# Patient Record
Sex: FEMALE | Race: WHITE | NOT HISPANIC OR LATINO | Employment: FULL TIME | URBAN - METROPOLITAN AREA
[De-identification: names, ages, dates, MRNs, and addresses within clinical notes are randomized per-mention and may not be internally consistent; named-entity substitution may affect disease eponyms.]

---

## 2018-08-17 ENCOUNTER — OFFICE VISIT (OUTPATIENT)
Dept: FAMILY MEDICINE | Facility: CLINIC | Age: 48
End: 2018-08-17
Payer: COMMERCIAL

## 2018-08-17 ENCOUNTER — LAB VISIT (OUTPATIENT)
Dept: LAB | Facility: HOSPITAL | Age: 48
End: 2018-08-17
Attending: NURSE PRACTITIONER
Payer: COMMERCIAL

## 2018-08-17 VITALS
SYSTOLIC BLOOD PRESSURE: 104 MMHG | HEART RATE: 61 BPM | OXYGEN SATURATION: 98 % | DIASTOLIC BLOOD PRESSURE: 78 MMHG | WEIGHT: 211.44 LBS | TEMPERATURE: 98 F | HEIGHT: 61 IN | BODY MASS INDEX: 39.92 KG/M2

## 2018-08-17 DIAGNOSIS — R10.817 GENERALIZED ABDOMINAL TENDERNESS WITHOUT REBOUND TENDERNESS: ICD-10-CM

## 2018-08-17 DIAGNOSIS — R10.10 UPPER ABDOMINAL PAIN: Primary | ICD-10-CM

## 2018-08-17 DIAGNOSIS — K21.9 GASTROESOPHAGEAL REFLUX DISEASE, ESOPHAGITIS PRESENCE NOT SPECIFIED: ICD-10-CM

## 2018-08-17 DIAGNOSIS — R10.10 UPPER ABDOMINAL PAIN: ICD-10-CM

## 2018-08-17 LAB
ALBUMIN SERPL BCP-MCNC: 4 G/DL
ALP SERPL-CCNC: 89 U/L
ALT SERPL W/O P-5'-P-CCNC: 22 U/L
ANION GAP SERPL CALC-SCNC: 8 MMOL/L
AST SERPL-CCNC: 20 U/L
BASOPHILS # BLD AUTO: 0.02 K/UL
BASOPHILS NFR BLD: 0.2 %
BILIRUB SERPL-MCNC: 0.5 MG/DL
BUN SERPL-MCNC: 11 MG/DL
CALCIUM SERPL-MCNC: 9.2 MG/DL
CHLORIDE SERPL-SCNC: 104 MMOL/L
CO2 SERPL-SCNC: 30 MMOL/L
CREAT SERPL-MCNC: 0.8 MG/DL
DIFFERENTIAL METHOD: ABNORMAL
EOSINOPHIL # BLD AUTO: 0.1 K/UL
EOSINOPHIL NFR BLD: 0.6 %
ERYTHROCYTE [DISTWIDTH] IN BLOOD BY AUTOMATED COUNT: 14.9 %
EST. GFR  (AFRICAN AMERICAN): >60 ML/MIN/1.73 M^2
EST. GFR  (NON AFRICAN AMERICAN): >60 ML/MIN/1.73 M^2
GLUCOSE SERPL-MCNC: 99 MG/DL
HCT VFR BLD AUTO: 40.1 %
HGB BLD-MCNC: 12.1 G/DL
IMM GRANULOCYTES # BLD AUTO: 0.03 K/UL
IMM GRANULOCYTES NFR BLD AUTO: 0.4 %
LYMPHOCYTES # BLD AUTO: 2.7 K/UL
LYMPHOCYTES NFR BLD: 32.1 %
MCH RBC QN AUTO: 22.1 PG
MCHC RBC AUTO-ENTMCNC: 30.2 G/DL
MCV RBC AUTO: 73 FL
MONOCYTES # BLD AUTO: 0.5 K/UL
MONOCYTES NFR BLD: 5.9 %
NEUTROPHILS # BLD AUTO: 5.2 K/UL
NEUTROPHILS NFR BLD: 60.8 %
NRBC BLD-RTO: 0 /100 WBC
PLATELET # BLD AUTO: 274 K/UL
PMV BLD AUTO: 10.7 FL
POTASSIUM SERPL-SCNC: 4.3 MMOL/L
PROT SERPL-MCNC: 7.2 G/DL
RBC # BLD AUTO: 5.48 M/UL
SODIUM SERPL-SCNC: 142 MMOL/L
WBC # BLD AUTO: 8.5 K/UL

## 2018-08-17 PROCEDURE — 80053 COMPREHEN METABOLIC PANEL: CPT

## 2018-08-17 PROCEDURE — 85025 COMPLETE CBC W/AUTO DIFF WBC: CPT

## 2018-08-17 PROCEDURE — 36415 COLL VENOUS BLD VENIPUNCTURE: CPT | Mod: PO

## 2018-08-17 PROCEDURE — 99999 PR PBB SHADOW E&M-NEW PATIENT-LVL IV: CPT | Mod: PBBFAC,,, | Performed by: NURSE PRACTITIONER

## 2018-08-17 PROCEDURE — 3008F BODY MASS INDEX DOCD: CPT | Mod: CPTII,S$GLB,, | Performed by: NURSE PRACTITIONER

## 2018-08-17 PROCEDURE — 99204 OFFICE O/P NEW MOD 45 MIN: CPT | Mod: S$GLB,,, | Performed by: NURSE PRACTITIONER

## 2018-08-17 RX ORDER — OMEPRAZOLE 20 MG/1
40 CAPSULE, DELAYED RELEASE ORAL DAILY
Qty: 30 CAPSULE | Refills: 3 | Status: SHIPPED | OUTPATIENT
Start: 2018-08-17 | End: 2020-05-15

## 2018-08-17 RX ORDER — OMEPRAZOLE 20 MG/1
40 CAPSULE, DELAYED RELEASE ORAL
COMMUNITY
Start: 2016-12-02 | End: 2018-08-17 | Stop reason: SDUPTHER

## 2018-08-17 NOTE — PROGRESS NOTES
"Subjective:       Patient ID: Madan More is a 48 y.o. female.    Chief Complaint: Abdominal Pain (after she eats- feels stuff comes up into chest/throat )    Patient who is new to me presents with a new problem of abdominal pain and GERD symptoms. She has been having reflux symptoms for years and now is having a worsening of abdominal pain.       Abdominal Pain   This is a new problem. The current episode started more than 1 year ago (4-5 years). The problem occurs daily. The problem has been rapidly worsening. The pain is located in the epigastric region. The pain is at a severity of 9/10. The pain is severe. Quality: feels like a hard ball of pain. Associated symptoms include constipation and nausea. Pertinent negatives include no anorexia, arthralgias, belching, diarrhea, dysuria, fever, flatus, frequency, headaches, melena, myalgias or vomiting. Associated symptoms comments: Everyday has a BM, no blood in stool. The pain is aggravated by eating. The pain is relieved by nothing. She has tried proton pump inhibitors for the symptoms. The treatment provided mild relief. Prior diagnostic workup includes upper endoscopy (found some "scar tissue" during endoscopy). Her past medical history is significant for GERD. There is no history of abdominal surgery, irritable bowel syndrome, pancreatitis, PUD or ulcerative colitis. Patient's medical history does not include kidney stones and UTI.     Review of Systems   Constitutional: Negative for chills, fatigue and fever.   HENT: Negative for congestion, sinus pressure, sinus pain, sneezing and sore throat.    Respiratory: Negative for cough, chest tightness, shortness of breath and wheezing.    Cardiovascular: Negative for chest pain, palpitations and leg swelling.   Gastrointestinal: Positive for abdominal pain, constipation and nausea. Negative for abdominal distention, anal bleeding, anorexia, blood in stool, diarrhea, flatus, melena, rectal pain and vomiting. "   Genitourinary: Negative for decreased urine volume, difficulty urinating, dysuria, frequency and urgency.   Musculoskeletal: Negative for arthralgias, gait problem, joint swelling and myalgias.   Skin: Negative for rash and wound.   Neurological: Negative for dizziness, light-headedness, numbness and headaches.       Objective:      Physical Exam   Constitutional: She is oriented to person, place, and time. She appears well-developed and well-nourished.   HENT:   Head: Normocephalic and atraumatic.   Right Ear: External ear normal.   Left Ear: External ear normal.   Nose: Nose normal.   Mouth/Throat: Oropharynx is clear and moist.   Eyes: Conjunctivae and EOM are normal. Pupils are equal, round, and reactive to light.   Neck: Normal range of motion. Neck supple.   Cardiovascular: Normal rate, regular rhythm, normal heart sounds and intact distal pulses.   Pulmonary/Chest: Effort normal and breath sounds normal.   Abdominal: Bowel sounds are decreased. There is generalized tenderness. There is no rigidity, no rebound, no guarding, no CVA tenderness, no tenderness at McBurney's point and negative Nolasco's sign.   Musculoskeletal: Normal range of motion.   Neurological: She is alert and oriented to person, place, and time.   Skin: Skin is warm and dry.   Psychiatric: She has a normal mood and affect. Her behavior is normal. Judgment and thought content normal.   Nursing note and vitals reviewed.      Assessment:       1. Upper abdominal pain    2. Gastroesophageal reflux disease, esophagitis presence not specified    3. Generalized abdominal tenderness without rebound tenderness    4. BMI 39.0-39.9,adult        Plan:       Upper abdominal pain  -     Comprehensive metabolic panel; Future; Expected date: 08/17/2018  -     CBC auto differential; Future; Expected date: 08/17/2018  -     US Abdomen Complete; Future; Expected date: 08/17/2018    Gastroesophageal reflux disease, esophagitis presence not specified  -      omeprazole (PRILOSEC) 20 MG capsule; Take 2 capsules (40 mg total) by mouth once daily.  Dispense: 30 capsule; Refill: 3  -     ranitidine (ZANTAC) 150 MG tablet; Take 1 tablet (150 mg total) by mouth 2 (two) times daily as needed for Heartburn.  Dispense: 60 tablet; Refill: 0    Generalized abdominal tenderness without rebound tenderness  -     Comprehensive metabolic panel; Future; Expected date: 08/17/2018  -     CBC auto differential; Future; Expected date: 08/17/2018  -     US Abdomen Complete; Future; Expected date: 08/17/2018    BMI 39.0-39.9,adult  Discussed dietary changes.     Will follow up with patient regarding lab and ultrasound. Patient to follow up with any new or concerning symptoms.

## 2018-08-17 NOTE — PATIENT INSTRUCTIONS
Abdominal Pain    Abdominal pain is pain in the stomach or belly area. Everyone has this pain from time to time. In many cases it goes away on its own. But abdominal pain can sometimes be due to a serious problem, such as appendicitis. So its important to know when to seek help.  Causes of abdominal pain  There are many possible causes of abdominal pain. Common causes in adults include:  · Constipation, diarrhea, or gas  · Stomach acid flowing back up into the esophagus (acid reflux or heartburn)  · Severe acid reflux, called GERD (gastroesophageal reflux disease)  · A sore in the lining of the stomach or small intestine (peptic ulcer)  · Inflammation of the gallbladder, liver, or pancreas  · Gallstones or kidney stones  · Appendicitis   · Intestinal blockage   · An internal organ pushing through a muscle or other tissue (hernia)  · Urinary tract infections  · In women, menstrual cramps, fibroids, or endometriosis  · Inflammation or infection of the intestines  Diagnosing the cause of abdominal pain  Your healthcare provider will do a physical exam help find the cause of your pain. If needed, tests will be ordered. Belly pain has many possible causes. So it can be hard to find the reason for your pain. Giving details about your pain can help. Tell your provider where and when you feel the pain, and what makes it better or worse. Also let your provider know if you have other symptoms such as:  · Fever  · Tiredness  · Upset stomach (nausea)  · Vomiting  · Changes in bathroom habits  Treating abdominal pain  Some causes of pain need emergency medical treatment right away. These include appendicitis or a bowel blockage. Other problems can be treated with rest, fluids, or medicines. Your healthcare provider can give you specific instructions for treatment or self-care based on what is causing your pain.  If you have vomiting or diarrhea, sip water or other clear fluids. When you are ready to eat solid foods again,  start with small amounts of easy-to-digest, low-fat foods. These include apple sauce, toast, or crackers.   When to seek medical care  Call 911 or go to the hospital right away if you:  · Cant pass stool and are vomiting  · Are vomiting blood or have bloody diarrhea or black, tarry diarrhea  · Have chest, neck, or shoulder pain  · Feel like you might pass out  · Have pain in your shoulder blades with nausea  · Have sudden, severe belly pain  · Have new, severe pain unlike any you have felt before  · Have a belly that is rigid, hard, and tender to touch  Call your healthcare provider if you have:  · Pain for more than 5 days  · Bloating for more than 2 days  · Diarrhea for more than 5 days  · A fever of 100.4°F (38.0°C) or higher, or as directed by your provider  · Pain that gets worse  · Weight loss for no reason  · Continued lack of appetite  · Blood in your stool  How to prevent abdominal pain  Here are some tips to help prevent abdominal pain:  · Eat smaller amounts of food at one time.  · Avoid greasy, fried, or other high-fat foods.  · Avoid foods that give you gas.  · Exercise regularly.  · Drink plenty of fluids.  To help prevent GERD symptoms:  · Quit smoking.  · Reduce alcohol and certain foods that increase stomach acid.  · Avoid aspirin and over-the-counter pain and fever medicines (NSAIDS or nonsteroidal anti-inflammatory drugs), if possible  · Lose extra weight.  · Finish eating at least 2 hours before you go to bed or lie down.  · Raise the head of your bed.  Date Last Reviewed: 7/1/2016  © 3234-6312 [employer]. [address] [place] [address], [place] [address]. All rights reserved. This information is not intended as a substitute for professional medical care. Always follow your healthcare professional's instructions.

## 2018-08-20 ENCOUNTER — TELEPHONE (OUTPATIENT)
Dept: FAMILY MEDICINE | Facility: CLINIC | Age: 48
End: 2018-08-20

## 2018-08-20 NOTE — TELEPHONE ENCOUNTER
----- Message from Dulce Maria Almanzar sent at 8/20/2018 12:16 PM CDT -----  Contact: Madan  Type:  Patient Returning Call    Who Called:  patient  Who Left Message for Patient:  Not sure  Does the patient know what this is regarding?:  possible  Best Call Back Number:  [phone]  Additional Information:  na

## 2018-08-20 NOTE — PROGRESS NOTES
Please call the patient and let her know that her chemistry lab and blood count is all within acceptable range. We will know more when we get the results of the ultrasound. Thanks.

## 2018-08-23 ENCOUNTER — HOSPITAL ENCOUNTER (OUTPATIENT)
Dept: RADIOLOGY | Facility: HOSPITAL | Age: 48
Discharge: HOME | End: 2018-08-23
Attending: NURSE PRACTITIONER
Payer: COMMERCIAL

## 2018-08-23 DIAGNOSIS — R10.10 UPPER ABDOMINAL PAIN: ICD-10-CM

## 2018-08-23 DIAGNOSIS — R10.817 GENERALIZED ABDOMINAL TENDERNESS WITHOUT REBOUND TENDERNESS: ICD-10-CM

## 2018-08-23 PROCEDURE — 76700 US EXAM ABDOM COMPLETE: CPT | Mod: TC,PO

## 2018-08-23 PROCEDURE — 76700 US EXAM ABDOM COMPLETE: CPT | Mod: 26,,, | Performed by: RADIOLOGY

## 2018-08-24 ENCOUNTER — TELEPHONE (OUTPATIENT)
Dept: FAMILY MEDICINE | Facility: CLINIC | Age: 48
End: 2018-08-24

## 2018-08-27 DIAGNOSIS — R10.9 ABDOMINAL PAIN, UNSPECIFIED ABDOMINAL LOCATION: Primary | ICD-10-CM

## 2018-09-25 ENCOUNTER — OFFICE VISIT (OUTPATIENT)
Dept: GASTROENTEROLOGY | Facility: CLINIC | Age: 48
End: 2018-09-25
Payer: COMMERCIAL

## 2018-09-25 VITALS
HEART RATE: 61 BPM | SYSTOLIC BLOOD PRESSURE: 104 MMHG | WEIGHT: 211.63 LBS | HEIGHT: 61 IN | DIASTOLIC BLOOD PRESSURE: 78 MMHG | BODY MASS INDEX: 39.95 KG/M2

## 2018-09-25 DIAGNOSIS — R68.81 EARLY SATIETY: ICD-10-CM

## 2018-09-25 DIAGNOSIS — R11.0 NAUSEA: ICD-10-CM

## 2018-09-25 DIAGNOSIS — R13.19 ESOPHAGEAL DYSPHAGIA: ICD-10-CM

## 2018-09-25 DIAGNOSIS — K21.9 GASTROESOPHAGEAL REFLUX DISEASE, ESOPHAGITIS PRESENCE NOT SPECIFIED: ICD-10-CM

## 2018-09-25 DIAGNOSIS — R10.13 EPIGASTRIC PAIN: Primary | ICD-10-CM

## 2018-09-25 PROCEDURE — 99999 PR PBB SHADOW E&M-EST. PATIENT-LVL III: CPT | Mod: PBBFAC,,, | Performed by: INTERNAL MEDICINE

## 2018-09-25 PROCEDURE — 3008F BODY MASS INDEX DOCD: CPT | Mod: CPTII,S$GLB,, | Performed by: INTERNAL MEDICINE

## 2018-09-25 PROCEDURE — 99204 OFFICE O/P NEW MOD 45 MIN: CPT | Mod: S$GLB,,, | Performed by: INTERNAL MEDICINE

## 2018-09-25 NOTE — PROGRESS NOTES
"Pt presents for evaluation chronic GI symptoms. Pt describes early satiety, pyrosis, and epigastric "fullness" past several years. Positive nausea. No vomiting. Positive pyrosis. Positive dysphagia, intermittent to solids. No bleeding. No diarrhea or constipation. No fever or jaundice. No significant abd pain. EGD many years ago, treated Nexium, partial response. Currently taking zantac without benefit. Appetite and weight stable. No rashes. Recent U/S notable for kidney and liver cysts, gallbladder adenomyomatosis.     REVIEW OF SYSTEMS:   Constitutional: Negative for fever, appetite change and unexpected weight change.  HENT: Negative for sore throat and positive trouble swallowing.  Eyes: Negative for visual disturbance.  Respiratory: Negative for chest tightness, shortness of breath and wheezing.  Cardiovascular: Negative for chest pain.  Gastrointestinal:  as per HPI  Genitourinary: Negative for dysuria, frequency and hematuria.  Musculoskeletal: Negative for myalgias, joint swelling and arthralgias.  Skin: Negative for color change and rash.   Neurological: Negative for syncope, weakness and headaches.  Psychiatric/Behavioral: Negative for confusion.    PHYSICAL EXAMINATION:                                                        GENERAL:  Comfortable, in no acute distress.      SKIN: Non-jaundiced.                             HEENT EXAM:  Nonicteric.  No adenopathy.  Oropharynx is clear.               NECK:  Supple.                                                               LUNGS:  Clear.                                                               CARDIAC:  Regular rate and rhythm.  S1, S2.  No murmur.                      ABDOMEN:  Soft, positive bowel sounds, nontender.  No hepatosplenomegaly or masses.  No rebound or guarding.                                             EXTREMITIES:  No edema.     NEURO: CN II-XII intact; motor/sensory non-focal.    IMP: 1. Early satiety          2. Epigastric pain     "      3. GERD          4. Dysphagia          5. Nausea    PLAN: 1. EGD/dilation.             2. Trial of PPI following EGD.             3. If EGD unremarkable, and symptoms persist, proceed with GES (r/o gastroparesis).

## 2018-10-05 ENCOUNTER — TELEPHONE (OUTPATIENT)
Dept: GASTROENTEROLOGY | Facility: CLINIC | Age: 48
End: 2018-10-05

## 2018-10-05 NOTE — TELEPHONE ENCOUNTER
----- Message from Garth Diaz sent at 10/5/2018  8:18 AM CDT -----  Contact: patient  Madan, [phone]. Calling to reschedule Monday's procedure. Please advise. Thanks.

## 2018-10-30 ENCOUNTER — PATIENT OUTREACH (OUTPATIENT)
Dept: ADMINISTRATIVE | Facility: HOSPITAL | Age: 48
End: 2018-10-30

## 2018-10-30 NOTE — LETTER
October 30, 2018    Madan More  [address] [place] [id number]             [facility]  [address]  [place] [address]  Phone: [phone] Dear Mrs. More:    [facility].  To help you get the most out of each of your visits, we will review your information to make sure you are up to date on all of your recommended tests and/or procedures.      As a new patient to Dr Rousseau , we may not have your complete medical records. She has found that your chart shows you may be due for lipid panel, mammogram and some vaccinations.     If you have had any of the above done at another facility, please bring the records or information with you so that your record at [facility] will be complete.      If you are currently taking medication, please bring it with you to your appointment for review.    Also, if you have any type of Advanced Directives, please bring them with you to your office visit so we may scan them into your chart.       If you have any questions or concerns, please don't hesitate to call.    Sincerely,        Betsy Romo LPN Clinical Care Coordinator  [facility]  [address] [facility] [address]  [place] [address]  [phone] (p)   [phone] (f)

## 2018-11-01 ENCOUNTER — TELEPHONE (OUTPATIENT)
Dept: GASTROENTEROLOGY | Facility: CLINIC | Age: 48
End: 2018-11-01

## 2018-11-01 NOTE — TELEPHONE ENCOUNTER
----- Message from Deandra Waters sent at 10/30/2018  9:32 AM CDT -----  Type: Needs Medical Advice    Who Called:  Patient  Best Call Back Number: [phone]  Additional Information: Needs to reschedule her procedure on 11/6/18. Please call to reschedule.

## 2018-11-05 ENCOUNTER — TELEPHONE (OUTPATIENT)
Dept: GASTROENTEROLOGY | Facility: CLINIC | Age: 48
End: 2018-11-05

## 2018-11-05 NOTE — TELEPHONE ENCOUNTER
----- Message from Samantha Fragoso sent at 11/2/2018  4:34 PM CDT -----    Pt  Is calling to  Speak to the nurse // please  Call  [phone]

## 2018-11-18 ENCOUNTER — ANESTHESIA EVENT (OUTPATIENT)
Dept: ENDOSCOPY | Facility: HOSPITAL | Age: 48
End: 2018-11-18
Payer: COMMERCIAL

## 2018-11-19 ENCOUNTER — ANESTHESIA (OUTPATIENT)
Dept: ENDOSCOPY | Facility: HOSPITAL | Age: 48
End: 2018-11-19
Payer: COMMERCIAL

## 2018-11-19 ENCOUNTER — HOSPITAL ENCOUNTER (OUTPATIENT)
Facility: HOSPITAL | Age: 48
Discharge: HOME | End: 2018-11-19
Attending: INTERNAL MEDICINE | Admitting: INTERNAL MEDICINE
Payer: COMMERCIAL

## 2018-11-19 VITALS
OXYGEN SATURATION: 100 % | SYSTOLIC BLOOD PRESSURE: 115 MMHG | DIASTOLIC BLOOD PRESSURE: 69 MMHG | TEMPERATURE: 97 F | WEIGHT: 205 LBS | BODY MASS INDEX: 40.25 KG/M2 | HEIGHT: 60 IN | HEART RATE: 70 BPM | RESPIRATION RATE: 13 BRPM

## 2018-11-19 DIAGNOSIS — R13.10 DYSPHAGIA: ICD-10-CM

## 2018-11-19 LAB — H PYLORI INDEX VALUE: NEGATIVE

## 2018-11-19 PROCEDURE — 43239 EGD BIOPSY SINGLE/MULTIPLE: CPT | Mod: PO | Performed by: INTERNAL MEDICINE

## 2018-11-19 PROCEDURE — 43239 EGD BIOPSY SINGLE/MULTIPLE: CPT | Mod: 59,,, | Performed by: INTERNAL MEDICINE

## 2018-11-19 PROCEDURE — 27201012 HC FORCEPS, HOT/COLD, DISP: Mod: PO | Performed by: INTERNAL MEDICINE

## 2018-11-19 PROCEDURE — 37000008 HC ANESTHESIA 1ST 15 MINUTES: Mod: PO | Performed by: INTERNAL MEDICINE

## 2018-11-19 PROCEDURE — 37000009 HC ANESTHESIA EA ADD 15 MINS: Mod: PO | Performed by: INTERNAL MEDICINE

## 2018-11-19 PROCEDURE — D9220A PRA ANESTHESIA: Mod: ANES,,, | Performed by: ANESTHESIOLOGY

## 2018-11-19 PROCEDURE — 87449 NOS EACH ORGANISM AG IA: CPT | Mod: PO | Performed by: INTERNAL MEDICINE

## 2018-11-19 PROCEDURE — 43248 EGD GUIDE WIRE INSERTION: CPT | Mod: ,,, | Performed by: INTERNAL MEDICINE

## 2018-11-19 PROCEDURE — D9220A PRA ANESTHESIA: Mod: CRNA,,, | Performed by: NURSE ANESTHETIST, CERTIFIED REGISTERED

## 2018-11-19 PROCEDURE — 63600175 PHARM REV CODE 636 W HCPCS: Mod: PO | Performed by: NURSE ANESTHETIST, CERTIFIED REGISTERED

## 2018-11-19 PROCEDURE — 88305 TISSUE EXAM BY PATHOLOGIST: CPT | Performed by: PATHOLOGY

## 2018-11-19 PROCEDURE — 43248 EGD GUIDE WIRE INSERTION: CPT | Mod: PO | Performed by: INTERNAL MEDICINE

## 2018-11-19 PROCEDURE — 25000003 PHARM REV CODE 250: Mod: PO | Performed by: INTERNAL MEDICINE

## 2018-11-19 RX ORDER — SODIUM CHLORIDE, SODIUM LACTATE, POTASSIUM CHLORIDE, CALCIUM CHLORIDE 600; 310; 30; 20 MG/100ML; MG/100ML; MG/100ML; MG/100ML
INJECTION, SOLUTION INTRAVENOUS CONTINUOUS
Status: DISCONTINUED | OUTPATIENT
Start: 2018-11-19 | End: 2018-11-19 | Stop reason: HOSPADM

## 2018-11-19 RX ORDER — LIDOCAINE HYDROCHLORIDE 10 MG/ML
1 INJECTION, SOLUTION EPIDURAL; INFILTRATION; INTRACAUDAL; PERINEURAL ONCE
Status: DISCONTINUED | OUTPATIENT
Start: 2018-11-19 | End: 2018-11-19 | Stop reason: HOSPADM

## 2018-11-19 RX ORDER — SUCRALFATE 1 G/1
1 TABLET ORAL 3 TIMES DAILY
Qty: 100 TABLET | Refills: 2 | Status: SHIPPED | OUTPATIENT
Start: 2018-11-19 | End: 2018-12-19

## 2018-11-19 RX ORDER — PROPOFOL 10 MG/ML
VIAL (ML) INTRAVENOUS
Status: DISCONTINUED | OUTPATIENT
Start: 2018-11-19 | End: 2018-11-19

## 2018-11-19 RX ORDER — SODIUM CHLORIDE 0.9 % (FLUSH) 0.9 %
3 SYRINGE (ML) INJECTION
Status: DISCONTINUED | OUTPATIENT
Start: 2018-11-19 | End: 2018-11-19 | Stop reason: HOSPADM

## 2018-11-19 RX ORDER — LIDOCAINE HCL/PF 100 MG/5ML
SYRINGE (ML) INTRAVENOUS
Status: DISCONTINUED | OUTPATIENT
Start: 2018-11-19 | End: 2018-11-19

## 2018-11-19 RX ADMIN — PROPOFOL 10 MG: 10 INJECTION, EMULSION INTRAVENOUS at 12:11

## 2018-11-19 RX ADMIN — PROPOFOL 100 MG: 10 INJECTION, EMULSION INTRAVENOUS at 11:11

## 2018-11-19 RX ADMIN — LIDOCAINE HYDROCHLORIDE 100 MG: 20 INJECTION, SOLUTION INTRAVENOUS at 11:11

## 2018-11-19 RX ADMIN — PROPOFOL 30 MG: 10 INJECTION, EMULSION INTRAVENOUS at 11:11

## 2018-11-19 RX ADMIN — SODIUM CHLORIDE, SODIUM LACTATE, POTASSIUM CHLORIDE, AND CALCIUM CHLORIDE: .6; .31; .03; .02 INJECTION, SOLUTION INTRAVENOUS at 10:11

## 2018-11-19 NOTE — [employer] PATIENT INSTRUCTIONS
Discharge Summary/Instructions after an Endoscopic Procedure  Patient Name: Madan More  Patient MRN: [id number]  Patient YOB: 1970 Monday, November 19, 2018  Gael Marina MD  RESTRICTIONS:  During your procedure today, you received medications for sedation.  These   medications may affect your judgment, balance and coordination.  Therefore,   for 24 hours, you have the following restrictions:   - DO NOT drive a car, operate machinery, make legal/financial decisions,   sign important papers or drink alcohol.    ACTIVITY:  Today: no heavy lifting, straining or running due to procedural   sedation/anesthesia.  The following day: return to full activity including work.  DIET:  Eat and drink normally unless instructed otherwise.     TREATMENT FOR COMMON SIDE EFFECTS:  - Mild abdominal pain, nausea, belching, bloating or excessive gas:  rest,   eat lightly and use a heating pad.  - Sore Throat: treat with throat lozenges and/or gargle with warm salt   water.  - Because air was used during the procedure, expelling large amounts of air   from your rectum or belching is normal.  - If a bowel prep was taken, you may not have a bowel movement for 1-3 days.    This is normal.  SYMPTOMS TO WATCH FOR AND REPORT TO YOUR PHYSICIAN:  1. Abdominal pain or bloating, other than gas cramps.  2. Chest pain.  3. Back pain.  4. Signs of infection such as: chills or fever occurring within 24 hours   after the procedure.  5. Rectal bleeding, which would show as bright red, maroon, or black stools.   (A tablespoon of blood from the rectum is not serious, especially if   hemorrhoids are present.)  6. Vomiting.  7. Weakness or dizziness.  GO DIRECTLY TO THE NEAREST EMERGENCY ROOM IF YOU HAVE ANY OF THE FOLLOWING:      Difficulty breathing              Chills and/or fever over 101 F   Persistent vomiting and/or vomiting blood   Severe abdominal pain   Severe chest pain   Black, tarry stools   Bleeding- more than one  tablespoon   Any other symptom or condition that you feel may need urgent attention  Your doctor recommends these additional instructions:  If any biopsies were taken, your doctors clinic will contact you in 1 to 2   weeks with any results.  We are waiting for your pathology results.   Continue your present medications.   You are being discharged to home.  For questions, problems or results please call your physician - Gael Marina MD at Work: [phone].  EMERGENCY PHONE NUMBER: [phone], LAB RESULTS: [phone]  IF A COMPLICATION OR EMERGENCY SITUATION ARISES AND YOU ARE UNABLE TO REACH   YOUR PHYSICIAN - GO DIRECTLY TO THE EMERGENCY ROOM.  ___________________________________________  Nurse Signature  ___________________________________________  Patient/Designated Responsible Party Signature  Gael Marina MD  11/19/2018 12:17:10 PM  This report has been verified and signed electronically.  [employer]

## 2018-11-19 NOTE — ANESTHESIA PREPROCEDURE EVALUATION
11/19/2018  Madan More is a 48 y.o., female.    Anesthesia Evaluation    I have reviewed the Patient Summary Reports.    I have reviewed the Nursing Notes.   I have reviewed the Medications.     Review of Systems  Social:  Non-Smoker, No Alcohol Use    Hematology/Oncology:  Hematology Normal   Oncology Normal     EENT/Dental:EENT/Dental Normal   Cardiovascular:  Cardiovascular Normal     Pulmonary:  Pulmonary Normal    Renal/:  Renal/ Normal     Hepatic/GI:   GERD Dysphagia    Musculoskeletal:  Musculoskeletal Normal    Neurological:  Neurology Normal    Endocrine:  Endocrine Normal    Dermatological:  Skin Normal    Psych:  Psychiatric Normal           Physical Exam  General:  Morbid Obesity    Airway/Jaw/Neck:  Airway Findings: Mouth Opening: Normal Tongue: Normal  General Airway Assessment: Adult  Mallampati: I  TM Distance: Normal, at least 6 cm        Eyes/Ears/Nose:  EYES/EARS/NOSE FINDINGS: Normal   Dental:  DENTAL FINDINGS: Normal   Chest/Lungs:  Chest/Lungs Findings: Clear to auscultation, Normal Respiratory Rate     Heart/Vascular:  Heart Findings: Rate: Normal  Rhythm: Regular Rhythm        Mental Status:  Mental Status Findings:  Cooperative, Alert and Oriented         Anesthesia Plan  Type of Anesthesia, risks & benefits discussed:  Anesthesia Type:  general  Patient's Preference:   Intra-op Monitoring Plan: standard ASA monitors  Intra-op Monitoring Plan Comments:   Post Op Pain Control Plan:   Post Op Pain Control Plan Comments:   Induction:   IV  Beta Blocker:  Patient is not currently on a Beta-Blocker (No further documentation required).       Informed Consent: Patient understands risks and agrees with Anesthesia plan.  Questions answered. Anesthesia consent signed with patient.  ASA Score: 2     Day of Surgery Review of History & Physical: I have interviewed and examined the  patient. I have reviewed the patient's H&P dated:  There are no significant changes.  H&P update referred to the provider.         Ready For Surgery From Anesthesia Perspective.

## 2018-11-19 NOTE — DISCHARGE INSTRUCTIONS
"    AFTER YOUR COLONOSCOPY:    What to expect after your procedure?    -You may have some abdominal discomfort today. This is usually from air that is trapped during your procedure. Often, relief from this is obtained by "passing" this air.     -You may resume your normal diet as tolerated. You should avoid "gassy" foods such as beans, broccoli or other vegetables, etc. It is not unusual for some patients to experience some mild nausea or maybe even some vomiting after this procedure.     -You may not have a normal bowel movement for 1 or 2 days since your colon has been cleared.     -You may notice small amounts of blood on the toilet tissue or mixed in your stool. This is often from irritation from the scope, hemorrhoids, or if a polyp or specimen was taken.    -If a polyp was removed or other specimen was taken, you will be notified of your results in 1-2 weeks.     -You should relax today and avoid vigorous activity. You may resume your normal activities tomorrow.     -Because you received sedation for your procedure, you are not allowed to drive today. You should also be careful over the next few hours as you may remain tired or "groggy", especially with position changes such as standing.      When to call your doctor?     -If you have abdominal pain that does not go away after 1 or 2 days.  -If you have bleeding that equals about a tablespoon or more.   -If you have a fever greater than 101'F.  -If you have persistent vomiting that last more than 6 hours.    Discharge Instructions: After Your Surgery  Youve just had surgery. During surgery, you were given medicine called anesthesia to keep you relaxed and free of pain. After surgery, you may have some pain or nausea. This is common. Here are some tips for feeling better and getting well after surgery.     Stay on schedule with your medicine.   Going home  Your healthcare provider will show you how to take care of yourself when you go home. He or she will also " answer your questions. Have an adult family member or friend drive you home. For the first 24 hours after your surgery:  · Do not drive or use heavy equipment.  · Do not make important decisions or sign legal papers.  · Do not drink alcohol.  · Have someone stay with you, if needed. He or she can watch for problems and help keep you safe.  Be sure to go to all follow-up visits with your healthcare provider. And rest after your surgery for as long as your healthcare provider tells you to.  Coping with pain  If you have pain after surgery, pain medicine will help you feel better. Take it as told, before pain becomes severe. Also, ask your healthcare provider or pharmacist about other ways to control pain. This might be with heat, ice, or relaxation. And follow any other instructions your surgeon or nurse gives you.  Tips for taking pain medicine  To get the best relief possible, remember these points:  · Pain medicines can upset your stomach. Taking them with a little food may help.  · Most pain relievers taken by mouth need at least 20 to 30 minutes to start to work.  · Taking medicine on a schedule can help you remember to take it. Try to time your medicine so that you can take it before starting an activity. This might be before you get dressed, go for a walk, or sit down for dinner.  · Constipation is a common side effect of pain medicines. Call your healthcare provider before taking any medicines such as laxatives or stool softeners to help ease constipation. Also ask if you should skip any foods. Drinking lots of fluids and eating foods such as fruits and vegetables that are high in fiber can also help. Remember, do not take laxatives unless your surgeon has prescribed them.  · Drinking alcohol and taking pain medicine can cause dizziness and slow your breathing. It can even be deadly. Do not drink alcohol while taking pain medicine.  · Pain medicine can make you react more slowly to things. Do not drive or run  machinery while taking pain medicine.  Your healthcare provider may tell you to take acetaminophen to help ease your pain. Ask him or her how much you are supposed to take each day. Acetaminophen or other pain relievers may interact with your prescription medicines or other over-the-counter (OTC) medicines. Some prescription medicines have acetaminophen and other ingredients. Using both prescription and OTC acetaminophen for pain can cause you to overdose. Read the labels on your OTC medicines with care. This will help you to clearly know the list of ingredients, how much to take, and any warnings. It may also help you not take too much acetaminophen. If you have questions or do not understand the information, ask your pharmacist or healthcare provider to explain it to you before you take the OTC medicine.  Managing nausea  Some people have an upset stomach after surgery. This is often because of anesthesia, pain, or pain medicine, or the stress of surgery. These tips will help you handle nausea and eat healthy foods as you get better. If you were on a special food plan before surgery, ask your healthcare provider if you should follow it while you get better. These tips may help:  · Do not push yourself to eat. Your body will tell you when to eat and how much.  · Start off with clear liquids and soup. They are easier to digest.  · Next try semi-solid foods, such as mashed potatoes, applesauce, and gelatin, as you feel ready.  · Slowly move to solid foods. Dont eat fatty, rich, or spicy foods at first.  · Do not force yourself to have 3 large meals a day. Instead eat smaller amounts more often.  · Take pain medicines with a small amount of solid food, such as crackers or toast, to avoid nausea.     Call your surgeon if  · You still have pain an hour after taking medicine. The medicine may not be strong enough.  · You feel too sleepy, dizzy, or groggy. The medicine may be too strong.  · You have side effects like  nausea, vomiting, or skin changes, such as rash, itching, or hives.       If you have obstructive sleep apnea  You were given anesthesia medicine during surgery to keep you comfortable and free of pain. After surgery, you may have more apnea spells because of this medicine and other medicines you were given. The spells may last longer than usual.   At home:  · Keep using the continuous positive airway pressure (CPAP) device when you sleep. Unless your healthcare provider tells you not to, use it when you sleep, day or night. CPAP is a common device used to treat obstructive sleep apnea.  · Talk with your provider before taking any pain medicine, muscle relaxants, or sedatives. Your provider will tell you about the possible dangers of taking these medicines.  Date Last Reviewed: 12/1/2016  © 7058-0754 [employer]. [address] [place] [address], [place] [address]. All rights reserved. This information is not intended as a substitute for professional medical care. Always follow your healthcare professional's instructions.

## 2018-11-19 NOTE — DISCHARGE SUMMARY
Discharge Note  Short Stay      SUMMARY     Admit Date: 11/19/2018    Attending Physician: Gael Marina MD     Discharge Physician: Gael Marina MD    Discharge Date: 11/19/2018 12:18 PM    Final Diagnosis: Dysphagia, unspecified type [R13.10]  Gastroesophageal reflux disease, esophagitis presence not specified [K21.9]  Early satiety [R68.81]    Disposition: HOME OR SELF CARE    Patient Instructions:   Current Discharge Medication List      START taking these medications    Details   sucralfate (CARAFATE) 1 gram tablet Take 1 tablet (1 g total) by mouth 3 (three) times daily.  Qty: 100 tablet, Refills: 2         CONTINUE these medications which have NOT CHANGED    Details   omeprazole (PRILOSEC) 20 MG capsule Take 2 capsules (40 mg total) by mouth once daily.  Qty: 30 capsule, Refills: 3    Associated Diagnoses: Gastroesophageal reflux disease, esophagitis presence not specified      albuterol 90 mcg/actuation inhaler Inhale 1-2 puffs into the lungs every 6 (six) hours as needed. Rescue  Qty: 1 Inhaler, Refills: 0      oxycodone-acetaminophen (PERCOCET) 5-325 mg per tablet Take 1 tablet by mouth every 4 (four) hours as needed for Pain.  Qty: 20 tablet, Refills: 0      ranitidine (ZANTAC) 150 MG capsule Take 150 mg by mouth 2 (two) times daily.             Discharge Procedure Orders (must include Diet, Follow-up, Activity)    Follow Up:  Follow up with PCP as previously scheduled  Resume routine diet.  Activity as tolerated.    No driving day of procedure.

## 2018-11-19 NOTE — H&P
History & Physical - Short Stay  Gastroenterology      SUBJECTIVE:     Procedure: EGD    Chief Complaint/Indication for Procedure: Dysphagia, Epigastric Pain and Reflux    PTA Medications   Medication Sig    omeprazole (PRILOSEC) 20 MG capsule Take 2 capsules (40 mg total) by mouth once daily.    albuterol 90 mcg/actuation inhaler Inhale 1-2 puffs into the lungs every 6 (six) hours as needed. Rescue    oxycodone-acetaminophen (PERCOCET) 5-325 mg per tablet Take 1 tablet by mouth every 4 (four) hours as needed for Pain.    ranitidine (ZANTAC) 150 MG capsule Take 150 mg by mouth 2 (two) times daily.       Review of patient's allergies indicates:   Allergen Reactions    Penicillins Hives and Swelling        Past Medical History:   Diagnosis Date    GERD (gastroesophageal reflux disease)      Past Surgical History:   Procedure Laterality Date    HYSTERECTOMY      KNEE SURGERY       Family History   Problem Relation Age of Onset    Hypertension Mother     No Known Problems Father      Social History     Tobacco Use    Smoking status: Never Smoker    Smokeless tobacco: Never Used   Substance Use Topics    Alcohol use: No    Drug use: No         OBJECTIVE:     Vital Signs (Most Recent)  Temp: 97.7 °F (36.5 °C) (11/19/18 1053)  Pulse: 68 (11/19/18 1053)  Resp: 18 (11/19/18 1053)  BP: 120/69 (11/19/18 1053)  SpO2: 97 % (11/19/18 1053)    Physical Exam:                                                       GENERAL:  Comfortable, in no acute distress.                                 HEENT EXAM:  Nonicteric.  No adenopathy.  Oropharynx is clear.               NECK:  Supple.                                                               LUNGS:  Clear.                                                               CARDIAC:  Regular rate and rhythm.  S1, S2.  No murmur.                      ABDOMEN:  Soft, positive bowel sounds, nontender.  No hepatosplenomegaly or masses.  No rebound or guarding.                                              EXTREMITIES:  No edema.     MENTAL STATUS:  Normal, alert and oriented.      ASSESSMENT/PLAN:     Assessment: Dysphagia, Epigastric Pain and Reflux    Plan: EGD    Anesthesia Plan: General    ASA Grade: ASA 2 - Patient with mild systemic disease with no functional limitations    MALLAMPATI SCORE:  I (soft palate, uvula, fauces, and tonsillar pillars visible)

## 2018-11-19 NOTE — TRANSFER OF CARE
Anesthesia Transfer of Care Note    Patient: Madan More    Procedure(s) Performed: Procedure(s) (LRB):  EGD (ESOPHAGOGASTRODUODENOSCOPY) (N/A)    Patient location: PACU    Anesthesia Type: general    Transport from OR: Transported from OR on room air with adequate spontaneous ventilation    Post pain: adequate analgesia    Post assessment: no apparent anesthetic complications and tolerated procedure well    Post vital signs: stable    Level of consciousness: awake and sedated    Nausea/Vomiting: no nausea/vomiting    Complications: none    Transfer of care protocol was followed      Last vitals:   Visit Vitals  /69 (BP Location: Right arm, Patient Position: Lying)   Pulse 68   Temp 36.5 °C (97.7 °F) (Skin)   Resp 18   Ht 5' (1.524 m)   Wt 93 kg (205 lb)   SpO2 97%   Breastfeeding? No   BMI 40.04 kg/m²

## 2018-11-20 NOTE — ANESTHESIA POSTPROCEDURE EVALUATION
Anesthesia Post Evaluation    Patient: Madan More    Procedure(s) Performed: Procedure(s) (LRB):  EGD (ESOPHAGOGASTRODUODENOSCOPY) (N/A)    Final Anesthesia Type: general  Patient location during evaluation: PACU  Patient participation: Yes- Able to Participate  Level of consciousness: awake and alert  Post-procedure vital signs: reviewed and stable  Pain management: adequate  Airway patency: patent  PONV status at discharge: No PONV  Anesthetic complications: no      Cardiovascular status: hemodynamically stable  Respiratory status: unassisted and room air  Hydration status: euvolemic  Follow-up not needed.        Visit Vitals  /69 (BP Location: Left arm, Patient Position: Sitting)   Pulse 70   Temp 36.3 °C (97.3 °F) (Skin)   Resp 13   Ht 5' (1.524 m)   Wt 93 kg (205 lb)   SpO2 100%   Breastfeeding? No   BMI 40.04 kg/m²       Pain/Lyubov Score: Pain Assessment Performed: Yes (11/19/2018 12:43 PM)  Presence of Pain: denies (11/19/2018 12:43 PM)  Lyubov Score: 10 (11/19/2018 12:43 PM)

## 2019-02-07 ENCOUNTER — TELEPHONE (OUTPATIENT)
Dept: FAMILY MEDICINE | Facility: CLINIC | Age: 49
End: 2019-02-07

## 2019-02-07 NOTE — TELEPHONE ENCOUNTER
----- Message from Marcos Cotton sent at 2/7/2019  9:04 AM CST -----  Contact: pt  Type:  Sooner Apoointment Request    Caller is requesting a sooner appointment.  Caller declined first available appointment listed below.  Caller will not accept being placed on the waitlist and is requesting a message be sent to doctor.    Name of Caller:  pt  When is the first available appointment?  None showing  Symptoms:  Bad headaches back pain  Best Call Back Number:  [phone]  Additional Information:

## 2019-02-11 ENCOUNTER — OFFICE VISIT (OUTPATIENT)
Dept: FAMILY MEDICINE | Facility: CLINIC | Age: 49
End: 2019-02-11
Payer: COMMERCIAL

## 2019-02-11 ENCOUNTER — HOSPITAL ENCOUNTER (OUTPATIENT)
Dept: RADIOLOGY | Facility: HOSPITAL | Age: 49
Discharge: HOME | End: 2019-02-11
Attending: NURSE PRACTITIONER
Payer: COMMERCIAL

## 2019-02-11 VITALS
HEART RATE: 62 BPM | HEIGHT: 60 IN | BODY MASS INDEX: 41.29 KG/M2 | TEMPERATURE: 98 F | WEIGHT: 210.31 LBS | OXYGEN SATURATION: 98 % | SYSTOLIC BLOOD PRESSURE: 98 MMHG | DIASTOLIC BLOOD PRESSURE: 70 MMHG

## 2019-02-11 DIAGNOSIS — G89.29 CHRONIC RIGHT-SIDED LOW BACK PAIN WITH RIGHT-SIDED SCIATICA: ICD-10-CM

## 2019-02-11 DIAGNOSIS — G89.29 CHRONIC INTRACTABLE HEADACHE, UNSPECIFIED HEADACHE TYPE: Primary | ICD-10-CM

## 2019-02-11 DIAGNOSIS — M54.41 CHRONIC RIGHT-SIDED LOW BACK PAIN WITH RIGHT-SIDED SCIATICA: ICD-10-CM

## 2019-02-11 DIAGNOSIS — R51.9 CHRONIC INTRACTABLE HEADACHE, UNSPECIFIED HEADACHE TYPE: Primary | ICD-10-CM

## 2019-02-11 DIAGNOSIS — Z23 NEED FOR TDAP VACCINATION: ICD-10-CM

## 2019-02-11 PROCEDURE — 90471 FLU VACCINE (QUAD) GREATER THAN OR EQUAL TO 3YO PRESERVATIVE FREE IM: ICD-10-PCS | Mod: S$GLB,,, | Performed by: NURSE PRACTITIONER

## 2019-02-11 PROCEDURE — 3008F BODY MASS INDEX DOCD: CPT | Mod: CPTII,S$GLB,, | Performed by: NURSE PRACTITIONER

## 2019-02-11 PROCEDURE — 72110 XR LUMBAR SPINE COMPLETE 5 VIEW: ICD-10-PCS | Mod: 26,,, | Performed by: RADIOLOGY

## 2019-02-11 PROCEDURE — 72110 X-RAY EXAM L-2 SPINE 4/>VWS: CPT | Mod: TC,FY,PO

## 2019-02-11 PROCEDURE — 90472 TDAP VACCINE GREATER THAN OR EQUAL TO 7YO IM: ICD-10-PCS | Mod: S$GLB,,, | Performed by: NURSE PRACTITIONER

## 2019-02-11 PROCEDURE — 90715 TDAP VACCINE 7 YRS/> IM: CPT | Mod: S$GLB,,, | Performed by: NURSE PRACTITIONER

## 2019-02-11 PROCEDURE — 90471 IMMUNIZATION ADMIN: CPT | Mod: S$GLB,,, | Performed by: NURSE PRACTITIONER

## 2019-02-11 PROCEDURE — 72110 X-RAY EXAM L-2 SPINE 4/>VWS: CPT | Mod: 26,,, | Performed by: RADIOLOGY

## 2019-02-11 PROCEDURE — 99214 PR OFFICE/OUTPT VISIT, EST, LEVL IV, 30-39 MIN: ICD-10-PCS | Mod: 25,S$GLB,, | Performed by: NURSE PRACTITIONER

## 2019-02-11 PROCEDURE — 90686 IIV4 VACC NO PRSV 0.5 ML IM: CPT | Mod: S$GLB,,, | Performed by: NURSE PRACTITIONER

## 2019-02-11 PROCEDURE — 99999 PR PBB SHADOW E&M-EST. PATIENT-LVL III: CPT | Mod: PBBFAC,,, | Performed by: NURSE PRACTITIONER

## 2019-02-11 PROCEDURE — 3008F PR BODY MASS INDEX (BMI) DOCUMENTED: ICD-10-PCS | Mod: CPTII,S$GLB,, | Performed by: NURSE PRACTITIONER

## 2019-02-11 PROCEDURE — 99214 OFFICE O/P EST MOD 30 MIN: CPT | Mod: 25,S$GLB,, | Performed by: NURSE PRACTITIONER

## 2019-02-11 PROCEDURE — 90686 FLU VACCINE (QUAD) GREATER THAN OR EQUAL TO 3YO PRESERVATIVE FREE IM: ICD-10-PCS | Mod: S$GLB,,, | Performed by: NURSE PRACTITIONER

## 2019-02-11 PROCEDURE — 90472 IMMUNIZATION ADMIN EACH ADD: CPT | Mod: S$GLB,,, | Performed by: NURSE PRACTITIONER

## 2019-02-11 PROCEDURE — 99999 PR PBB SHADOW E&M-EST. PATIENT-LVL III: ICD-10-PCS | Mod: PBBFAC,,, | Performed by: NURSE PRACTITIONER

## 2019-02-11 PROCEDURE — 90715 TDAP VACCINE GREATER THAN OR EQUAL TO 7YO IM: ICD-10-PCS | Mod: S$GLB,,, | Performed by: NURSE PRACTITIONER

## 2019-02-11 RX ORDER — TOPIRAMATE 25 MG/1
25 TABLET ORAL NIGHTLY
Qty: 30 TABLET | Refills: 2 | Status: SHIPPED | OUTPATIENT
Start: 2019-02-11 | End: 2019-04-25 | Stop reason: SDUPTHER

## 2019-02-11 NOTE — PROGRESS NOTES
Subjective:       Patient ID: Madan More is a 48 y.o. female.    Chief Complaint: Low-back Pain; Headache; Flu Vaccine; and Tetnus Shot Needed    Ms. More is a new patient to me. She presents today for headaches and back pain     Low-back Pain   This is a chronic problem. The current episode started more than 1 month ago. The problem occurs daily. The problem has been unchanged. Associated symptoms include headaches and numbness. Pertinent negatives include no chest pain, congestion, coughing, diaphoresis, fever or rash. Associated symptoms comments: Shooting pains down right leg. Exacerbated by:  with bucket seats. She has tried NSAIDs for the symptoms. The treatment provided mild relief.   Headache    This is a chronic problem. The problem occurs daily (wakes up with headache daily). The problem has been unchanged. The pain is located in the frontal region. The pain does not radiate. The quality of the pain is described as aching. Associated symptoms include back pain and numbness. Pertinent negatives include no coughing, eye redness, fever or sinus pressure. Associated symptoms comments: Nauseous at times. She has tried NSAIDs for the symptoms. The treatment provided mild relief.   reports drinking adequate amounts of water, sleeping well   Vitals:    02/11/19 0914   BP: 98/70   Pulse: 62   Temp: 98 °F (36.7 °C)     Review of Systems   Constitutional: Negative for diaphoresis and fever.   HENT: Negative for congestion, facial swelling, sinus pressure, sinus pain and trouble swallowing.    Eyes: Negative for discharge and redness.   Respiratory: Negative for cough and shortness of breath.    Cardiovascular: Negative for chest pain and palpitations.   Gastrointestinal: Negative for constipation and diarrhea.   Genitourinary: Negative for difficulty urinating.   Musculoskeletal: Positive for back pain. Negative for gait problem.   Skin: Negative for pallor and rash.   Neurological:  Positive for numbness and headaches. Negative for facial asymmetry and speech difficulty.   Psychiatric/Behavioral: Negative for confusion. The patient is not nervous/anxious.        Past Medical History:   Diagnosis Date    GERD (gastroesophageal reflux disease)      Objective:      Physical Exam   Constitutional: She is oriented to person, place, and time. She does not have a sickly appearance. No distress.   HENT:   Head: Normocephalic.   Right Ear: Hearing normal.   Left Ear: Hearing normal.   Nose: Nose normal.   Eyes: Conjunctivae and lids are normal.   Neck: No JVD present. No tracheal deviation present.   Cardiovascular: Normal rate and normal heart sounds.   Pulmonary/Chest: Effort normal and breath sounds normal.   Abdominal: Normal appearance. She exhibits no distension.   Musculoskeletal: She exhibits no deformity.        Lumbar back: She exhibits pain.   Neurological: She is alert and oriented to person, place, and time.   Skin: She is not diaphoretic. No pallor.   Psychiatric: She has a normal mood and affect. Her speech is normal and behavior is normal. Judgment and thought content normal. Cognition and memory are normal.   Nursing note and vitals reviewed.      Assessment:       1. Chronic intractable headache, unspecified headache type    2. Chronic right-sided low back pain with right-sided sciatica    3. Need for Tdap vaccination        Plan:       Chronic intractable headache, unspecified headache type  -     CBC auto differential; Future; Expected date: 02/11/2019  -     Comprehensive metabolic panel; Future; Expected date: 02/11/2019  -     TSH; Future; Expected date: 02/11/2019  -     topiramate (TOPAMAX) 25 MG tablet; Take 1 tablet (25 mg total) by mouth every evening.  Dispense: 30 tablet; Refill: 2    Chronic right-sided low back pain with right-sided sciatica  -     X-Ray Lumbar Spine Complete 5 View; Future; Expected date: 02/11/2019    Need for Tdap vaccination  -     (In Office  Administered) Tdap Vaccine      Follow-up for further evaluation if s/s worsen, fail to improve, or new symptoms arise; to establish with MD.    Medication List with Changes/Refills   New Medications    TOPIRAMATE (TOPAMAX) 25 MG TABLET    Take 1 tablet (25 mg total) by mouth every evening.   Current Medications    OMEPRAZOLE (PRILOSEC) 20 MG CAPSULE    Take 2 capsules (40 mg total) by mouth once daily.   Discontinued Medications    ALBUTEROL 90 MCG/ACTUATION INHALER    Inhale 1-2 puffs into the lungs every 6 (six) hours as needed. Rescue    OXYCODONE-ACETAMINOPHEN (PERCOCET) 5-325 MG PER TABLET    Take 1 tablet by mouth every 4 (four) hours as needed for Pain.    RANITIDINE (ZANTAC) 150 MG CAPSULE    Take 150 mg by mouth 2 (two) times daily.

## 2019-04-11 ENCOUNTER — PATIENT OUTREACH (OUTPATIENT)
Dept: ADMINISTRATIVE | Facility: HOSPITAL | Age: 49
End: 2019-04-11

## 2019-04-11 NOTE — PROGRESS NOTES
Health Maintenance Due   Topic Date Due    Lipid Panel  1970    Mammogram  09/06/2018     Pre-visit outreach via mail

## 2019-04-11 NOTE — LETTER
April 11, 2019    Madan HEDRICK [id number]             [facility]  [address]  [place] [address]  Phone: [phone] [facility].  To help you get the most out of each of your visits, we will review your information to make sure you are up to date on all of your recommended tests and/or procedures.      As a new patient to Dr. Garcia   , we may not have your complete medical records.  After reviewing your chart have found that you may be due for the following:    Fasting cholesterol labs (Lipid Panel)  Mammogram    If you have had any of the above done at another facility, please bring the records or information with you so that your record at [facility] will be complete.      If you are currently taking medication, please bring it with you to your appointment for review.    Thank you for letting us care for you,    Arline Dixon, Care Coordinator  [facility]  Phone: [phone]  Fax: [phone]

## 2019-04-25 ENCOUNTER — HOSPITAL ENCOUNTER (OUTPATIENT)
Dept: RADIOLOGY | Facility: HOSPITAL | Age: 49
Discharge: HOME | End: 2019-04-25
Attending: INTERNAL MEDICINE
Payer: COMMERCIAL

## 2019-04-25 ENCOUNTER — OFFICE VISIT (OUTPATIENT)
Dept: FAMILY MEDICINE | Facility: CLINIC | Age: 49
End: 2019-04-25
Payer: COMMERCIAL

## 2019-04-25 VITALS
HEIGHT: 60 IN | SYSTOLIC BLOOD PRESSURE: 114 MMHG | OXYGEN SATURATION: 99 % | DIASTOLIC BLOOD PRESSURE: 72 MMHG | WEIGHT: 213.88 LBS | HEART RATE: 65 BPM | BODY MASS INDEX: 41.99 KG/M2

## 2019-04-25 DIAGNOSIS — M25.561 RIGHT KNEE PAIN, UNSPECIFIED CHRONICITY: ICD-10-CM

## 2019-04-25 DIAGNOSIS — G89.29 CHRONIC NONINTRACTABLE HEADACHE, UNSPECIFIED HEADACHE TYPE: ICD-10-CM

## 2019-04-25 DIAGNOSIS — G89.29 CHRONIC INTRACTABLE HEADACHE, UNSPECIFIED HEADACHE TYPE: ICD-10-CM

## 2019-04-25 DIAGNOSIS — R51.9 CHRONIC NONINTRACTABLE HEADACHE, UNSPECIFIED HEADACHE TYPE: ICD-10-CM

## 2019-04-25 DIAGNOSIS — K21.9 GASTROESOPHAGEAL REFLUX DISEASE, ESOPHAGITIS PRESENCE NOT SPECIFIED: ICD-10-CM

## 2019-04-25 DIAGNOSIS — Z00.00 ROUTINE PHYSICAL EXAMINATION: Primary | ICD-10-CM

## 2019-04-25 DIAGNOSIS — R51.9 CHRONIC INTRACTABLE HEADACHE, UNSPECIFIED HEADACHE TYPE: ICD-10-CM

## 2019-04-25 PROCEDURE — 73564 X-RAY EXAM KNEE 4 OR MORE: CPT | Mod: TC,50,FY,PO

## 2019-04-25 PROCEDURE — 99999 PR PBB SHADOW E&M-EST. PATIENT-LVL III: ICD-10-PCS | Mod: PBBFAC,,, | Performed by: INTERNAL MEDICINE

## 2019-04-25 PROCEDURE — 99396 PREV VISIT EST AGE 40-64: CPT | Mod: S$GLB,,, | Performed by: INTERNAL MEDICINE

## 2019-04-25 PROCEDURE — 99396 PR PREVENTIVE VISIT,EST,40-64: ICD-10-PCS | Mod: S$GLB,,, | Performed by: INTERNAL MEDICINE

## 2019-04-25 PROCEDURE — 99999 PR PBB SHADOW E&M-EST. PATIENT-LVL III: CPT | Mod: PBBFAC,,, | Performed by: INTERNAL MEDICINE

## 2019-04-25 PROCEDURE — 73564 X-RAY EXAM KNEE 4 OR MORE: CPT | Mod: 26,,, | Performed by: RADIOLOGY

## 2019-04-25 PROCEDURE — 73564 XR KNEE ORTHO BILAT WITH FLEXION: ICD-10-PCS | Mod: 26,,, | Performed by: RADIOLOGY

## 2019-04-25 RX ORDER — TOPIRAMATE 25 MG/1
25 TABLET ORAL NIGHTLY
Qty: 30 TABLET | Refills: 11 | Status: SHIPPED | OUTPATIENT
Start: 2019-04-25 | End: 2020-12-17

## 2019-04-25 RX ORDER — ALBUTEROL SULFATE 90 UG/1
AEROSOL, METERED RESPIRATORY (INHALATION)
COMMUNITY
End: 2020-05-15

## 2019-04-25 RX ORDER — NAPROXEN 500 MG/1
500 TABLET ORAL 2 TIMES DAILY PRN
Qty: 45 TABLET | Refills: 1 | Status: SHIPPED | OUTPATIENT
Start: 2019-04-25 | End: 2020-05-15

## 2019-04-25 NOTE — PROGRESS NOTES
Subjective:       Patient ID: Madan More is a 49 y.o. female.    Chief Complaint: Establish Care and Annual Exam    Here for routine health maintenance.    New pt to me  Chronic headaches - improved on low dose topamax  GERD - controlled  Complains of right knee pain and stiffness for 2 mo.  Improved with daily ibuprofen.  Has had steroid shots in this knee before.  Left knee hx meniscus repair.     Review of Systems   Constitutional: Negative for appetite change and fever.   HENT: Negative for nosebleeds and trouble swallowing.    Eyes: Negative for discharge and visual disturbance.   Respiratory: Negative for choking and shortness of breath.    Cardiovascular: Negative for chest pain and palpitations.   Gastrointestinal: Negative for abdominal pain, nausea and vomiting.   Musculoskeletal: Positive for arthralgias. Negative for joint swelling.   Skin: Negative for rash and wound.   Neurological: Negative for dizziness and syncope.   Psychiatric/Behavioral: Negative for confusion and dysphoric mood.       Objective:      Vitals:    04/25/19 1120   BP: 114/72   Pulse: 65     Physical Exam   Constitutional: She appears well-nourished.   Eyes: Conjunctivae and EOM are normal.   Neck: Trachea normal and normal range of motion. No thyromegaly present.   Cardiovascular: Normal heart sounds.   Edema negative   Pulmonary/Chest: Effort normal and breath sounds normal.   Abdominal: Soft. There is no hepatomegaly.   Musculoskeletal:   ROM normal bilateral  Strength normal bilateral  Right medial knee + TTP; non swollen   Neurological: She has normal reflexes. No cranial nerve deficit.   DTR decreased bilateral   Skin: Skin is warm, dry and intact.   Psychiatric: She has a normal mood and affect.   Alert and Oriented    Vitals reviewed.        Assessment:       1. Routine physical examination    2. Chronic nonintractable headache, unspecified headache type    3. Right knee pain, unspecified chronicity    4.  Gastroesophageal reflux disease, esophagitis presence not specified    5. Chronic intractable headache, unspecified headache type        Plan:       Routine physical examination  -     Lipid panel; Future; Expected date: 04/25/2019  -     Mammo Digital Screening Bilat; Future; Expected date: 04/25/2019    Chronic nonintractable headache, unspecified headache type    Right knee pain, unspecified chronicity  -     X-ray Knee Ortho Bilateral with Flexion; Future; Expected date: 04/25/2019  -     Ambulatory consult to Orthopedics  -     naproxen (NAPROSYN) 500 MG tablet; Take 1 tablet (500 mg total) by mouth 2 (two) times daily as needed.  Dispense: 45 tablet; Refill: 1    Gastroesophageal reflux disease, esophagitis presence not specified    Chronic intractable headache, unspecified headache type  -     topiramate (TOPAMAX) 25 MG tablet; Take 1 tablet (25 mg total) by mouth every evening.  Dispense: 30 tablet; Refill: 11            Medication List with Changes/Refills   New Medications    NAPROXEN (NAPROSYN) 500 MG TABLET    Take 1 tablet (500 mg total) by mouth 2 (two) times daily as needed.   Current Medications    ALBUTEROL (VENTOLIN HFA) 90 MCG/ACTUATION INHALER    Ventolin HFA 90 mcg/actuation aerosol inhaler   INHALE ONE - TWO puffs EVERY 6 HOURS AS NEEDED    OMEPRAZOLE (PRILOSEC) 20 MG CAPSULE    Take 2 capsules (40 mg total) by mouth once daily.   Changed and/or Refilled Medications    Modified Medication Previous Medication    TOPIRAMATE (TOPAMAX) 25 MG TABLET topiramate (TOPAMAX) 25 MG tablet       Take 1 tablet (25 mg total) by mouth every evening.    Take 1 tablet (25 mg total) by mouth every evening.     Wellness reviewed  Orthotic referral/name given  Likely needs steroid shot   Continue current management and monitor.    Counseled on regular exercise, maintenance of a healthy weight, balanced diet rich in fruits/vegetables and lean protein, and avoidance of unhealthy habits like smoking and excessive  alcohol intake.   Also, counseled on importance of being compliant with medication, health appointments, diet and exercise.     Follow up in about 1 year (around 4/25/2020).

## 2019-04-29 ENCOUNTER — TELEPHONE (OUTPATIENT)
Dept: FAMILY MEDICINE | Facility: CLINIC | Age: 49
End: 2019-04-29

## 2019-04-29 ENCOUNTER — HOSPITAL ENCOUNTER (OUTPATIENT)
Dept: RADIOLOGY | Facility: HOSPITAL | Age: 49
Discharge: HOME | End: 2019-04-29
Attending: INTERNAL MEDICINE
Payer: COMMERCIAL

## 2019-04-29 VITALS — WEIGHT: 213 LBS | BODY MASS INDEX: 41.82 KG/M2 | HEIGHT: 60 IN

## 2019-04-29 DIAGNOSIS — Z00.00 ROUTINE PHYSICAL EXAMINATION: ICD-10-CM

## 2019-04-29 PROCEDURE — 77067 MAMMO DIGITAL SCREENING BILAT WITH TOMOSYNTHESIS_CAD: ICD-10-PCS | Mod: 26,,, | Performed by: RADIOLOGY

## 2019-04-29 PROCEDURE — 77063 MAMMO DIGITAL SCREENING BILAT WITH TOMOSYNTHESIS_CAD: ICD-10-PCS | Mod: 26,,, | Performed by: RADIOLOGY

## 2019-04-29 PROCEDURE — 77067 SCR MAMMO BI INCL CAD: CPT | Mod: TC,PO

## 2019-04-29 PROCEDURE — 77067 SCR MAMMO BI INCL CAD: CPT | Mod: 26,,, | Performed by: RADIOLOGY

## 2019-04-29 PROCEDURE — 77063 BREAST TOMOSYNTHESIS BI: CPT | Mod: 26,,, | Performed by: RADIOLOGY

## 2019-04-29 NOTE — TELEPHONE ENCOUNTER
Called to speak with patient in regards to lab results per Dr. Garcia. Left a message and a call back number.

## 2020-02-18 LAB — CHLAMYDIA /N. GONORRHOEAE SCREEN: NOT DETECTED

## 2020-05-12 ENCOUNTER — TELEPHONE (OUTPATIENT)
Dept: FAMILY MEDICINE | Facility: CLINIC | Age: 50
End: 2020-05-12

## 2020-05-12 NOTE — TELEPHONE ENCOUNTER
----- Message from Haroon Washington sent at 5/12/2020  2:51 PM CDT -----  Type: Needs Medical Advice  Who Called:  Patient    Best Call Back Number: [phone]  Additional Information: Patient states that she was informed by Mable that she could be seen as a New Patient with Medicaid.  Epic would not let me schedule.  Please call to advise

## 2020-05-12 NOTE — TELEPHONE ENCOUNTER
----- Message from Diana West sent at 5/12/2020  1:24 PM CDT -----  Contact: pt  Type:  Sooner Apoointment Request    Caller is requesting a sooner appointment.  Caller declined first available appointment listed below.  Caller will not accept being placed on the waitlist and is requesting a message be sent to doctor.    Name of Caller:  pt  When is the first available appointment?  New pt  Symptoms:    Best Call Back Number:  [phone] (home)   Additional Information:  na

## 2020-05-13 ENCOUNTER — TELEPHONE (OUTPATIENT)
Dept: FAMILY MEDICINE | Facility: CLINIC | Age: 50
End: 2020-05-13

## 2020-05-13 NOTE — TELEPHONE ENCOUNTER
----- Message from Ladonna Morgan sent at 5/13/2020 12:43 PM CDT -----  Contact: Patient Return Call  Type:  Patient Returning Call    Who Called:  Patient  Who Left Message for Patient:  Savanna  Does the patient know what this is regarding?:  n/a  Best Call Back Number:  [phone]

## 2020-05-15 ENCOUNTER — OFFICE VISIT (OUTPATIENT)
Dept: FAMILY MEDICINE | Facility: CLINIC | Age: 50
End: 2020-05-15
Payer: MEDICAID

## 2020-05-15 VITALS
HEART RATE: 69 BPM | OXYGEN SATURATION: 98 % | DIASTOLIC BLOOD PRESSURE: 74 MMHG | BODY MASS INDEX: 41.62 KG/M2 | RESPIRATION RATE: 18 BRPM | HEIGHT: 61 IN | WEIGHT: 220.44 LBS | SYSTOLIC BLOOD PRESSURE: 110 MMHG

## 2020-05-15 DIAGNOSIS — M18.12 OSTEOARTHRITIS OF CARPOMETACARPAL JOINT OF LEFT THUMB, UNSPECIFIED OSTEOARTHRITIS TYPE: ICD-10-CM

## 2020-05-15 DIAGNOSIS — E78.5 HYPERLIPIDEMIA, UNSPECIFIED HYPERLIPIDEMIA TYPE: ICD-10-CM

## 2020-05-15 DIAGNOSIS — R73.03 PREDIABETES: ICD-10-CM

## 2020-05-15 DIAGNOSIS — R51.9 CHRONIC NONINTRACTABLE HEADACHE, UNSPECIFIED HEADACHE TYPE: ICD-10-CM

## 2020-05-15 DIAGNOSIS — E11.9 TYPE 2 DIABETES MELLITUS WITHOUT COMPLICATION: ICD-10-CM

## 2020-05-15 DIAGNOSIS — H69.93 ETD (EUSTACHIAN TUBE DYSFUNCTION), BILATERAL: ICD-10-CM

## 2020-05-15 DIAGNOSIS — K21.9 GASTROESOPHAGEAL REFLUX DISEASE, ESOPHAGITIS PRESENCE NOT SPECIFIED: Primary | ICD-10-CM

## 2020-05-15 DIAGNOSIS — G89.29 CHRONIC NONINTRACTABLE HEADACHE, UNSPECIFIED HEADACHE TYPE: ICD-10-CM

## 2020-05-15 DIAGNOSIS — Z00.00 PREVENTATIVE HEALTH CARE: ICD-10-CM

## 2020-05-15 PROCEDURE — 99999 PR PBB SHADOW E&M-EST. PATIENT-LVL III: CPT | Mod: PBBFAC,,, | Performed by: INTERNAL MEDICINE

## 2020-05-15 PROCEDURE — 99214 OFFICE O/P EST MOD 30 MIN: CPT | Mod: S$PBB,,, | Performed by: INTERNAL MEDICINE

## 2020-05-15 PROCEDURE — 99214 PR OFFICE/OUTPT VISIT, EST, LEVL IV, 30-39 MIN: ICD-10-PCS | Mod: S$PBB,,, | Performed by: INTERNAL MEDICINE

## 2020-05-15 PROCEDURE — 99999 PR PBB SHADOW E&M-EST. PATIENT-LVL III: ICD-10-PCS | Mod: PBBFAC,,, | Performed by: INTERNAL MEDICINE

## 2020-05-15 PROCEDURE — 99213 OFFICE O/P EST LOW 20 MIN: CPT | Mod: PBBFAC,PN | Performed by: INTERNAL MEDICINE

## 2020-05-15 RX ORDER — GLIPIZIDE 5 MG/1
TABLET, FILM COATED, EXTENDED RELEASE ORAL
COMMUNITY
Start: 2020-05-11 | End: 2021-11-16

## 2020-05-15 RX ORDER — OFLOXACIN 3 MG/ML
SOLUTION/ DROPS OPHTHALMIC
COMMUNITY
Start: 2020-03-30 | End: 2020-05-15

## 2020-05-15 RX ORDER — FLUTICASONE PROPIONATE 50 MCG
1 SPRAY, SUSPENSION (ML) NASAL DAILY
Qty: 16 G | Refills: 5 | Status: SHIPPED | OUTPATIENT
Start: 2020-05-15 | End: 2021-10-05

## 2020-05-15 RX ORDER — OMEPRAZOLE 20 MG/1
40 CAPSULE, DELAYED RELEASE ORAL DAILY
Qty: 90 CAPSULE | Refills: 1 | Status: SHIPPED | OUTPATIENT
Start: 2020-05-15 | End: 2021-11-16

## 2020-05-15 RX ORDER — ASPIRIN 81 MG/1
81 TABLET ORAL
COMMUNITY
Start: 2020-01-20 | End: 2022-10-11

## 2020-05-15 RX ORDER — ATORVASTATIN CALCIUM 20 MG/1
TABLET, FILM COATED ORAL
COMMUNITY
Start: 2020-05-11 | End: 2020-08-14 | Stop reason: SDUPTHER

## 2020-05-15 RX ORDER — LATANOPROST 50 UG/ML
1 SOLUTION/ DROPS OPHTHALMIC NIGHTLY
COMMUNITY
End: 2020-08-14

## 2020-05-15 RX ORDER — DICLOFENAC SODIUM 10 MG/G
2 GEL TOPICAL 4 TIMES DAILY PRN
Qty: 100 G | Refills: 2 | Status: SHIPPED | OUTPATIENT
Start: 2020-05-15 | End: 2020-12-17

## 2020-05-15 NOTE — PROGRESS NOTES
Assessment and Plan:    1. Gastroesophageal reflux disease, esophagitis presence not specified  OK to continue PPI, gets symptoms whenever she does not take this.  - omeprazole (PRILOSEC) 20 MG capsule; Take 2 capsules (40 mg total) by mouth once daily.  Dispense: 90 capsule; Refill: 1  - Comprehensive metabolic panel; Future    2. Prediabetes  Last A1c 6.4 not on medications. Will be taking glipizide and will repeat A1c in 6 months.  - Hemoglobin A1C; Future  - Lipid Panel; Future  - Comprehensive metabolic panel; Future    3. Chronic nonintractable headache, unspecified headache type  OK to continue topamax.    4. Hyperlipidemia, unspecified hyperlipidemia type  Continue atorvastatin.   - Lipid Panel; Future  - Comprehensive metabolic panel; Future    5. Preventative health care  - HIV 1/2 Ag/Ab (4th Gen); Future    6. ETD (Eustachian tube dysfunction), bilateral  - fluticasone propionate (FLONASE) 50 mcg/actuation nasal spray; 1 spray (50 mcg total) by Each Nostril route once daily.  Dispense: 16 g; Refill: 5    7. Osteoarthritis of carpometacarpal joint of left thumb, unspecified osteoarthritis type  Discussed using NSAIDs and brace. Consider seeing hand specialist if not improving.  - diclofenac sodium (VOLTAREN) 1 % Gel; Apply 2 g topically 4 (four) times daily as needed.  Dispense: 100 g; Refill: 2    ______________________________________________________________________  Subjective:    Chief Complaint:  Establish care    HPI:  Madan is a 50 y.o. year old woman here to establish care. Previously had been seen by Dr. Garcia.     GERD- She has been taking prilosec and reports that she does get reflux whenever she does not take this.    Chronic Headaches- Taking topamax. Had been seeing Neurologist but told she could just return PRN if headaches are worsening.    Glaucoma- Taking latanoprost, seeing [facility].    Prediabetes- Reports that her previous doctor had told her that her A1c was 6.4. Had  been started on metformin but had significant nausea with this so switched to glipizide, but she has not really been taking this yet.    HLD- Taking atorvastatin. No problems with this.     Has had some pain at the base of her left thumb. She is left handed. Has tried using a thumb brace with minimal improvement. Does not wear this regularly.     She has been having sensation of fluid in her ears (more on right) for about 6 months. Not having pain. Sometimes hearing is muffled.    Past Medical History:  Past Medical History:   Diagnosis Date    GERD (gastroesophageal reflux disease)        Past Surgical History:  Past Surgical History:   Procedure Laterality Date    ESOPHAGOGASTRODUODENOSCOPY N/A 11/19/2018    Procedure: EGD (ESOPHAGOGASTRODUODENOSCOPY);  Surgeon: Gael Marina MD;  Location: [facility];  Service: Endoscopy;  Laterality: N/A;    HYSTERECTOMY      KNEE SURGERY         Family History:  Family History   Problem Relation Age of Onset    Hypertension Mother     No Known Problems Father        Social History:  Social History     Socioeconomic History    Marital status:      Spouse name: Not on file    Number of children: Not on file    Years of education: Not on file    Highest education level: Not on file   Occupational History    Not on file   Social Needs    Financial resource strain: Not on file    Food insecurity:     Worry: Not on file     Inability: Not on file    Transportation needs:     Medical: Not on file     Non-medical: Not on file   Tobacco Use    Smoking status: Never Smoker    Smokeless tobacco: Never Used   Substance and Sexual Activity    Alcohol use: No    Drug use: No    Sexual activity: Not on file   Lifestyle    Physical activity:     Days per week: Not on file     Minutes per session: Not on file    Stress: Not on file   Relationships    Social connections:     Talks on phone: Not on file     Gets together: Not on file     Attends Holiness  service: Not on file     Active member of club or organization: Not on file     Attends meetings of clubs or organizations: Not on file     Relationship status: Not on file   Other Topics Concern    Not on file   Social History Narrative    Not on file       Medications:  Current Outpatient Medications on File Prior to Visit   Medication Sig Dispense Refill    aspirin (ECOTRIN) 81 MG EC tablet Take 81 mg by mouth.      naproxen sodium (ANAPROX) 550 MG tablet Take 1 tablet (550 mg total) by mouth 2 (two) times daily with meals. 20 tablet 0    ofloxacin (OCUFLOX) 0.3 % ophthalmic solution Apply 5 drop into affected ear twice a day as directed      topiramate (TOPAMAX) 25 MG tablet Take 1 tablet (25 mg total) by mouth every evening. 30 tablet 11    atorvastatin (LIPITOR) 20 MG tablet       glipiZIDE (GLUCOTROL) 5 MG TR24       [DISCONTINUED] albuterol (VENTOLIN HFA) 90 mcg/actuation inhaler Ventolin HFA 90 mcg/actuation aerosol inhaler   INHALE ONE - TWO puffs EVERY 6 HOURS AS NEEDED      [DISCONTINUED] mupirocin (BACTROBAN) 2 % ointment Apply topically 3 (three) times daily. (Patient not taking: Reported on 5/15/2020) 22 g 0    [DISCONTINUED] naproxen (NAPROSYN) 500 MG tablet Take 1 tablet (500 mg total) by mouth 2 (two) times daily as needed. (Patient not taking: Reported on 5/15/2020) 45 tablet 1    [DISCONTINUED] omeprazole (PRILOSEC) 20 MG capsule Take 2 capsules (40 mg total) by mouth once daily. (Patient not taking: Reported on 5/15/2020) 30 capsule 3    [DISCONTINUED] oxaprozin (DAYPRO) 600 mg tablet Take 2 tablets (1,200 mg total) by mouth once daily. (Patient not taking: Reported on 5/15/2020) 20 tablet 0     No current facility-administered medications on file prior to visit.        Allergies:  Penicillins    Immunizations:  Immunization History   Administered Date(s) Administered    DTP 1972, 1972, 1972, 09/26/1973, 03/23/1977, 06/11/1987, 02/25/1997    Hepatitis B, Adult  "10/09/2000    Influenza 01/03/2015, 10/07/2015    Influenza - Quadrivalent - PF (6 months and older) 10/25/2007, 10/20/2017, 02/11/2019    Measles 10/10/2000    Measles / Rubella 04/18/1973    OPV 1972, 1972, 1972, 09/26/1973, 03/23/1977    Rubella 10/27/1996    Tdap 07/20/2009, 02/11/2019       Review of Systems:  Review of Systems   Constitutional: Negative for chills and fever.   HENT: Positive for hearing loss. Negative for congestion, ear pain and trouble swallowing.    Respiratory: Negative for chest tightness and shortness of breath.    Cardiovascular: Negative for chest pain and leg swelling.   Musculoskeletal: Positive for arthralgias. Negative for back pain.   Skin: Negative for rash and wound.   Allergic/Immunologic: Negative for environmental allergies and food allergies.   Neurological: Positive for headaches. Negative for dizziness.   Psychiatric/Behavioral: Negative for dysphoric mood. The patient is not nervous/anxious.        Objective:    Vitals:  Vitals:    05/15/20 0813   BP: 110/74   Pulse: 69   Resp: 18   SpO2: 98%   Weight: 100 kg (220 lb 7.4 oz)   Height: 5' 1" (1.549 m)   PainSc:   8   PainLoc: Hand       Physical Exam   Constitutional: She is oriented to person, place, and time. She appears well-developed and well-nourished. No distress.   HENT:   Right Ear: Tympanic membrane and ear canal normal.   Left Ear: Tympanic membrane and ear canal normal.   Mouth/Throat: Oropharynx is clear and moist.   Eyes: No scleral icterus.   Cardiovascular: Normal rate and regular rhythm.   No murmur heard.  Pulmonary/Chest: Effort normal and breath sounds normal. No respiratory distress. She has no wheezes.   Musculoskeletal: She exhibits no edema.   left 1st CMC joint is TTP and pain with any resisted motion   Neurological: She is alert and oriented to person, place, and time.   Skin: Skin is warm and dry.   Psychiatric: She has a normal mood and affect. Her behavior is normal. "   Vitals reviewed.      Body mass index is 41.66 kg/m².      Data:  Previous labs reviewed and pertinent for A1c 6.4.        Damaris Moulton MD  Internal Medicine

## 2020-07-24 DIAGNOSIS — E11.9 TYPE 2 DIABETES MELLITUS WITHOUT COMPLICATION, UNSPECIFIED WHETHER LONG TERM INSULIN USE: ICD-10-CM

## 2020-08-03 ENCOUNTER — PATIENT OUTREACH (OUTPATIENT)
Dept: ADMINISTRATIVE | Facility: HOSPITAL | Age: 50
End: 2020-08-03

## 2020-08-07 ENCOUNTER — LAB VISIT (OUTPATIENT)
Dept: LAB | Facility: HOSPITAL | Age: 50
End: 2020-08-07
Attending: INTERNAL MEDICINE
Payer: MEDICAID

## 2020-08-07 DIAGNOSIS — E78.5 HYPERLIPIDEMIA, UNSPECIFIED HYPERLIPIDEMIA TYPE: ICD-10-CM

## 2020-08-07 DIAGNOSIS — K21.9 GASTROESOPHAGEAL REFLUX DISEASE, ESOPHAGITIS PRESENCE NOT SPECIFIED: ICD-10-CM

## 2020-08-07 DIAGNOSIS — R73.03 PREDIABETES: ICD-10-CM

## 2020-08-07 DIAGNOSIS — Z00.00 PREVENTATIVE HEALTH CARE: ICD-10-CM

## 2020-08-07 LAB
ALBUMIN SERPL BCP-MCNC: 4.1 G/DL (ref 3.5–5.2)
ALP SERPL-CCNC: 109 U/L (ref 55–135)
ALT SERPL W/O P-5'-P-CCNC: 12 U/L (ref 10–44)
ANION GAP SERPL CALC-SCNC: 8 MMOL/L (ref 8–16)
AST SERPL-CCNC: 16 U/L (ref 10–40)
BILIRUB SERPL-MCNC: 0.3 MG/DL (ref 0.1–1)
BUN SERPL-MCNC: 10 MG/DL (ref 6–20)
CALCIUM SERPL-MCNC: 8.6 MG/DL (ref 8.7–10.5)
CHLORIDE SERPL-SCNC: 106 MMOL/L (ref 95–110)
CHOLEST SERPL-MCNC: 220 MG/DL (ref 120–199)
CHOLEST/HDLC SERPL: 4.3 {RATIO} (ref 2–5)
CO2 SERPL-SCNC: 28 MMOL/L (ref 23–29)
CREAT SERPL-MCNC: 0.7 MG/DL (ref 0.5–1.4)
EST. GFR  (AFRICAN AMERICAN): >60 ML/MIN/1.73 M^2
EST. GFR  (NON AFRICAN AMERICAN): >60 ML/MIN/1.73 M^2
ESTIMATED AVG GLUCOSE: 123 MG/DL (ref 68–131)
GLUCOSE SERPL-MCNC: 96 MG/DL (ref 70–110)
HBA1C MFR BLD HPLC: 5.9 % (ref 4–5.6)
HDLC SERPL-MCNC: 51 MG/DL (ref 40–75)
HDLC SERPL: 23.2 % (ref 20–50)
LDLC SERPL CALC-MCNC: 152.4 MG/DL (ref 63–159)
NONHDLC SERPL-MCNC: 169 MG/DL
POTASSIUM SERPL-SCNC: 4 MMOL/L (ref 3.5–5.1)
PROT SERPL-MCNC: 7.4 G/DL (ref 6–8.4)
SODIUM SERPL-SCNC: 142 MMOL/L (ref 136–145)
TRIGL SERPL-MCNC: 83 MG/DL (ref 30–150)

## 2020-08-07 PROCEDURE — 80061 LIPID PANEL: CPT

## 2020-08-07 PROCEDURE — 86703 HIV-1/HIV-2 1 RESULT ANTBDY: CPT

## 2020-08-07 PROCEDURE — 80053 COMPREHEN METABOLIC PANEL: CPT

## 2020-08-07 PROCEDURE — 83036 HEMOGLOBIN GLYCOSYLATED A1C: CPT

## 2020-08-07 PROCEDURE — 36415 COLL VENOUS BLD VENIPUNCTURE: CPT | Mod: PO

## 2020-08-10 LAB — HIV 1+2 AB+HIV1 P24 AG SERPL QL IA: NEGATIVE

## 2020-08-14 ENCOUNTER — OFFICE VISIT (OUTPATIENT)
Dept: FAMILY MEDICINE | Facility: CLINIC | Age: 50
End: 2020-08-14
Payer: MEDICAID

## 2020-08-14 VITALS
TEMPERATURE: 98 F | SYSTOLIC BLOOD PRESSURE: 120 MMHG | HEART RATE: 75 BPM | HEIGHT: 61 IN | OXYGEN SATURATION: 99 % | DIASTOLIC BLOOD PRESSURE: 80 MMHG | BODY MASS INDEX: 42.33 KG/M2 | WEIGHT: 224.19 LBS

## 2020-08-14 DIAGNOSIS — E78.5 HYPERLIPIDEMIA, UNSPECIFIED HYPERLIPIDEMIA TYPE: Primary | ICD-10-CM

## 2020-08-14 DIAGNOSIS — R73.03 PREDIABETES: ICD-10-CM

## 2020-08-14 PROCEDURE — 99213 OFFICE O/P EST LOW 20 MIN: CPT | Mod: PBBFAC,PN | Performed by: INTERNAL MEDICINE

## 2020-08-14 PROCEDURE — 99214 OFFICE O/P EST MOD 30 MIN: CPT | Mod: S$PBB,,, | Performed by: INTERNAL MEDICINE

## 2020-08-14 PROCEDURE — 99214 PR OFFICE/OUTPT VISIT, EST, LEVL IV, 30-39 MIN: ICD-10-PCS | Mod: S$PBB,,, | Performed by: INTERNAL MEDICINE

## 2020-08-14 PROCEDURE — 99999 PR PBB SHADOW E&M-EST. PATIENT-LVL III: CPT | Mod: PBBFAC,,, | Performed by: INTERNAL MEDICINE

## 2020-08-14 PROCEDURE — 99999 PR PBB SHADOW E&M-EST. PATIENT-LVL III: ICD-10-PCS | Mod: PBBFAC,,, | Performed by: INTERNAL MEDICINE

## 2020-08-14 RX ORDER — ATORVASTATIN CALCIUM 20 MG/1
40 TABLET, FILM COATED ORAL NIGHTLY
Qty: 90 TABLET | Refills: 3 | Status: SHIPPED | OUTPATIENT
Start: 2020-08-14 | End: 2022-10-11

## 2020-08-14 NOTE — PROGRESS NOTES
Assessment and Plan:    1. Hyperlipidemia, unspecified hyperlipidemia type  Not well controlled on atorvastatin 20, will increase to 40 mg daily.   - atorvastatin (LIPITOR) 20 MG tablet; Take 2 tablets (40 mg total) by mouth every evening.  Dispense: 90 tablet; Refill: 3    2. Prediabetes  A1c improved from 6.4 to 5.9 with diet changes. Encouraged continued efforts.     ______________________________________________________________________  Subjective:    Chief Complaint:  Follow up chronic medical conditions.     HPI:  Madan is a 50 y.o. year old female here to follow up chronic medical conditions.     Prediabetes- A1c down to 5.9 from 6.4. No history of having been diagnosed with diabetes. She takes glipizide for prediabetes. She has really been working on changes to her diet as well.    HLD- Has been taking the atorvastatin 20 mg daily. No side effects.     Doing well on topamax currently, not having migraines lately.    Medications:  Current Outpatient Medications on File Prior to Visit   Medication Sig Dispense Refill    aspirin (ECOTRIN) 81 MG EC tablet Take 81 mg by mouth.      atorvastatin (LIPITOR) 20 MG tablet       diclofenac sodium (VOLTAREN) 1 % Gel Apply 2 g topically 4 (four) times daily as needed. 100 g 2    fluticasone propionate (FLONASE) 50 mcg/actuation nasal spray 1 spray (50 mcg total) by Each Nostril route once daily. 16 g 5    glipiZIDE (GLUCOTROL) 5 MG TR24       omeprazole (PRILOSEC) 20 MG capsule Take 2 capsules (40 mg total) by mouth once daily. 90 capsule 1    topiramate (TOPAMAX) 25 MG tablet Take 1 tablet (25 mg total) by mouth every evening. 30 tablet 11    [DISCONTINUED] latanoprost 0.005 % ophthalmic solution 1 drop every evening.      [DISCONTINUED] naproxen sodium (ANAPROX) 550 MG tablet Take 1 tablet (550 mg total) by mouth 2 (two) times daily with meals. 20 tablet 0     No current facility-administered medications on file prior to visit.        Review of  "Systems:  Review of Systems   Constitutional: Negative for chills and fever.   Respiratory: Negative for shortness of breath and wheezing.    Cardiovascular: Negative for chest pain and leg swelling.   Gastrointestinal: Negative for constipation and diarrhea.   Musculoskeletal: Positive for arthralgias (right thumb).   Neurological: Negative for seizures and syncope.       Past Medical History:  Past Medical History:   Diagnosis Date    GERD (gastroesophageal reflux disease)     Prediabetes        Objective:    Vitals:  Vitals:    08/14/20 0959   BP: 120/80   Pulse: 75   Temp: 98.1 °F (36.7 °C)   TempSrc: Oral   SpO2: 99%   Weight: 101.7 kg (224 lb 3.3 oz)   Height: 5' 1" (1.549 m)       Physical Exam  Vitals signs reviewed.   Constitutional:       General: She is not in acute distress.     Appearance: She is well-developed.   Eyes:      General:         Right eye: No discharge.         Left eye: No discharge.      Conjunctiva/sclera: Conjunctivae normal.   Cardiovascular:      Rate and Rhythm: Normal rate and regular rhythm.   Pulmonary:      Effort: Pulmonary effort is normal. No respiratory distress.   Skin:     General: Skin is warm and dry.   Neurological:      Mental Status: She is alert and oriented to person, place, and time.   Psychiatric:         Behavior: Behavior normal.         Thought Content: Thought content normal.         Judgment: Judgment normal.         Data:  Previous labs reviewed and pertinent for A1c 5.9, LDL >150.      Damaris Moulton MD  Internal Medicine    "

## 2020-08-14 NOTE — Clinical Note
Can she be taken off diabetic registry? No history of DM, only A1c in our system is 5.9. She takes glipizide for prediabetes.

## 2020-12-17 ENCOUNTER — OFFICE VISIT (OUTPATIENT)
Dept: FAMILY MEDICINE | Facility: CLINIC | Age: 50
End: 2020-12-17
Payer: MEDICAID

## 2020-12-17 VITALS
HEIGHT: 61 IN | BODY MASS INDEX: 41.45 KG/M2 | WEIGHT: 219.56 LBS | HEART RATE: 64 BPM | TEMPERATURE: 97 F | SYSTOLIC BLOOD PRESSURE: 106 MMHG | DIASTOLIC BLOOD PRESSURE: 82 MMHG

## 2020-12-17 DIAGNOSIS — M54.12 CERVICAL RADICULOPATHY: Primary | ICD-10-CM

## 2020-12-17 DIAGNOSIS — G56.22 ULNAR NEUROPATHY OF LEFT UPPER EXTREMITY: ICD-10-CM

## 2020-12-17 DIAGNOSIS — G89.29 CHRONIC INTRACTABLE HEADACHE, UNSPECIFIED HEADACHE TYPE: ICD-10-CM

## 2020-12-17 DIAGNOSIS — R73.03 PREDIABETES: ICD-10-CM

## 2020-12-17 DIAGNOSIS — R51.9 CHRONIC INTRACTABLE HEADACHE, UNSPECIFIED HEADACHE TYPE: ICD-10-CM

## 2020-12-17 PROCEDURE — 99214 PR OFFICE/OUTPT VISIT, EST, LEVL IV, 30-39 MIN: ICD-10-PCS | Mod: S$PBB,,, | Performed by: INTERNAL MEDICINE

## 2020-12-17 PROCEDURE — 99214 OFFICE O/P EST MOD 30 MIN: CPT | Mod: S$PBB,,, | Performed by: INTERNAL MEDICINE

## 2020-12-17 PROCEDURE — 99214 OFFICE O/P EST MOD 30 MIN: CPT | Mod: PBBFAC,PN,25 | Performed by: INTERNAL MEDICINE

## 2020-12-17 PROCEDURE — 90686 IIV4 VACC NO PRSV 0.5 ML IM: CPT | Mod: PBBFAC,PN

## 2020-12-17 PROCEDURE — 99999 PR PBB SHADOW E&M-EST. PATIENT-LVL IV: ICD-10-PCS | Mod: PBBFAC,,, | Performed by: INTERNAL MEDICINE

## 2020-12-17 PROCEDURE — 99999 PR PBB SHADOW E&M-EST. PATIENT-LVL IV: CPT | Mod: PBBFAC,,, | Performed by: INTERNAL MEDICINE

## 2020-12-17 RX ORDER — CYCLOBENZAPRINE HCL 5 MG
5 TABLET ORAL 3 TIMES DAILY PRN
Qty: 21 TABLET | Refills: 0 | Status: SHIPPED | OUTPATIENT
Start: 2020-12-17 | End: 2020-12-24

## 2020-12-17 RX ORDER — METHYLPREDNISOLONE 4 MG/1
TABLET ORAL
Qty: 1 PACKAGE | Refills: 0 | Status: SHIPPED | OUTPATIENT
Start: 2020-12-17 | End: 2021-01-07

## 2020-12-17 RX ORDER — TOPIRAMATE 25 MG/1
25 TABLET ORAL NIGHTLY
Qty: 90 TABLET | Refills: 1 | Status: SHIPPED | OUTPATIENT
Start: 2020-12-17 | End: 2022-10-11

## 2020-12-17 RX ORDER — NAPROXEN 500 MG/1
500 TABLET ORAL 2 TIMES DAILY WITH MEALS
Qty: 14 TABLET | Refills: 0 | Status: SHIPPED | OUTPATIENT
Start: 2020-12-17 | End: 2020-12-24

## 2020-12-17 NOTE — PROGRESS NOTES
Assessment and Plan:    1. Cervical radiculopathy  - methylPREDNISolone (MEDROL DOSEPACK) 4 mg tablet; use as directed  Dispense: 1 Package; Refill: 0  - cyclobenzaprine (FLEXERIL) 5 MG tablet; Take 1 tablet (5 mg total) by mouth 3 (three) times daily as needed for Muscle spasms.  Dispense: 21 tablet; Refill: 0  - naproxen (NAPROSYN) 500 MG tablet; Take 1 tablet (500 mg total) by mouth 2 (two) times daily with meals. for 7 days  Dispense: 14 tablet; Refill: 0  - Ambulatory referral/consult to Physical/Occupational Therapy; Future    2. Ulnar neuropathy of left upper extremity  - methylPREDNISolone (MEDROL DOSEPACK) 4 mg tablet; use as directed  Dispense: 1 Package; Refill: 0  - cyclobenzaprine (FLEXERIL) 5 MG tablet; Take 1 tablet (5 mg total) by mouth 3 (three) times daily as needed for Muscle spasms.  Dispense: 21 tablet; Refill: 0  - naproxen (NAPROSYN) 500 MG tablet; Take 1 tablet (500 mg total) by mouth 2 (two) times daily with meals. for 7 days  Dispense: 14 tablet; Refill: 0  - Ambulatory referral/consult to Physical/Occupational Therapy; Future    Exam and history have features of both ulnar neuropathy and cervical radiculopathy, so it is difficult to determine which is contributing more to the symptoms. Discussed short course of steroids, NSAIDs, and muscle relaxer. Discussed starting PT. If not improving with this, would recommend seeing Neurology for EMG to better determine etiology of symptoms.     3. Chronic intractable headache, unspecified headache type  OK to restart topamax but favor waiting until no longer needing cyclobenzaprine before restarting.  - topiramate (TOPAMAX) 25 MG tablet; Take 1 tablet (25 mg total) by mouth every evening.  Dispense: 90 tablet; Refill: 1    4. Prediabetes  Labs before next visit  - Hemoglobin A1C; Future  - Basic Metabolic Panel; Future        ______________________________________________________________________  Subjective:    Chief Complaint:  Left arm  pain    HPI:  Madan is a 50 y.o. year old female here for evaluation of left arm pain.     States that this started about 1 week ago with pain in her left elbow. At that time the pain was worse with moving her elbow. In the last week she has started to notice numbness all along the lateral side of her arm from mid upper arm down through her fingers on the left side. She reports that the pain is still only over the elbow, though the numbness is more extensive. Feels numb as if she had layed on it for too long. States that she has not been having any neck pain.     Has a history of migraines and had been doing well on topamax. Has been out of this for a few months and is having some headaches again. Currently she has a headache with some light sensitivity, but not as severe as her usual migraines.      Medications:  Current Outpatient Medications on File Prior to Visit   Medication Sig Dispense Refill    aspirin (ECOTRIN) 81 MG EC tablet Take 81 mg by mouth.      atorvastatin (LIPITOR) 20 MG tablet Take 2 tablets (40 mg total) by mouth every evening. 90 tablet 3    glipiZIDE (GLUCOTROL) 5 MG TR24       omeprazole (PRILOSEC) 20 MG capsule Take 2 capsules (40 mg total) by mouth once daily. 90 capsule 1    fluticasone propionate (FLONASE) 50 mcg/actuation nasal spray 1 spray (50 mcg total) by Each Nostril route once daily. 16 g 5    [DISCONTINUED] diclofenac sodium (VOLTAREN) 1 % Gel Apply 2 g topically 4 (four) times daily as needed. 100 g 2    [DISCONTINUED] topiramate (TOPAMAX) 25 MG tablet Take 1 tablet (25 mg total) by mouth every evening. 30 tablet 11     No current facility-administered medications on file prior to visit.        Review of Systems:  Review of Systems   Constitutional: Negative for chills, fever and unexpected weight change.   Musculoskeletal: Positive for arthralgias.   Neurological: Positive for numbness. Negative for dizziness and weakness.       Past Medical History:  Past Medical  "History:   Diagnosis Date    GERD (gastroesophageal reflux disease)     Prediabetes        Objective:    Vitals:  Vitals:    12/17/20 1101   BP: 106/82   Pulse: 64   Temp: 97.2 °F (36.2 °C)   TempSrc: Skin   Weight: 99.6 kg (219 lb 9.3 oz)   Height: 5' 1" (1.549 m)   PainSc:   6   PainLoc: Head       Physical Exam  Vitals signs reviewed.   Constitutional:       General: She is not in acute distress.     Appearance: She is well-developed.   Eyes:      General:         Right eye: No discharge.         Left eye: No discharge.      Conjunctiva/sclera: Conjunctivae normal.   Cardiovascular:      Rate and Rhythm: Normal rate and regular rhythm.   Pulmonary:      Effort: Pulmonary effort is normal. No respiratory distress.   Musculoskeletal:        Arms:       Comments: left arm has pain with palpation over guyon's canal; mild pain with elbow flexion/extension and shoulder abduction; left trapezius is centrally tender to palpation;    Patient reports significant increase in numbness and pain with Spurling maneuver   Skin:     General: Skin is warm and dry.   Neurological:      Mental Status: She is alert and oriented to person, place, and time.   Psychiatric:         Behavior: Behavior normal.         Thought Content: Thought content normal.         Judgment: Judgment normal.         Data:  Previous labs reviewed and pertinent for Cr WNL.      Damaris Moulton MD  Internal Medicine    "

## 2020-12-22 ENCOUNTER — CLINICAL SUPPORT (OUTPATIENT)
Dept: REHABILITATION | Facility: HOSPITAL | Age: 50
End: 2020-12-22
Payer: MEDICAID

## 2020-12-22 DIAGNOSIS — R29.3 POSTURE ABNORMALITY: ICD-10-CM

## 2020-12-22 DIAGNOSIS — R29.898 WEAKNESS OF LEFT ARM: ICD-10-CM

## 2020-12-22 DIAGNOSIS — M25.632 DECREASED RANGE OF MOTION OF LEFT WRIST: ICD-10-CM

## 2020-12-22 DIAGNOSIS — M54.12 CERVICAL RADICULOPATHY: ICD-10-CM

## 2020-12-22 DIAGNOSIS — G56.22 ULNAR NEUROPATHY OF LEFT UPPER EXTREMITY: ICD-10-CM

## 2020-12-22 PROCEDURE — 97110 THERAPEUTIC EXERCISES: CPT | Mod: PN

## 2020-12-22 PROCEDURE — 97161 PT EVAL LOW COMPLEX 20 MIN: CPT | Mod: PN

## 2020-12-22 NOTE — PLAN OF CARE
[facility]  Physical Therapy Initial Evaluation    Name: Madan More  Clinic Number: [id number]    Therapy Diagnosis:   Encounter Diagnoses   Name Primary?    Cervical radiculopathy     Ulnar neuropathy of left upper extremity     Weakness of left arm     Decreased range of motion of left wrist     Posture abnormality      Physician: Damaris Moulton MD    Physician Orders: PT Eval and Treat   Medical Diagnosis from Referral:   M54.12 (ICD-10-CM) - Cervical radiculopathy   G56.22 (ICD-10-CM) - Ulnar neuropathy of left upper extremity     Evaluation Date: 12/22/2020  Authorization Period Expiration: 1/22/21  Plan of Care Expiration: 2/16/2021  Visit # / Visits authorized: 1/ 1    Time In: 1245 PM  Time Out: 0130 PM  Total Billable Time: 45 minutes    Precautions: Standard    Subjective     Date of onset: Approx 1 month   History of current condition - Madan reports: insidious onset of left elbow/upper arm pain for the last month. Her symptoms include UE weakness, TTP, numbness/tingling, and stiffness. Symptoms have not improved in recent weeks and she reports no previous injury to this region. Numbness and tingling is from the upper arm and extends down into 4th and 5th digit.      Pain:  Current 7/10, worst 10/10, best 5/10   Location: left arm  Description: Aching, Dull, Tingling and Numb  Aggravating Factors: Extension, Flexing and Lifting  Easing Factors: rest    Prior Therapy: has PT for hand and knee  Social History: lives with their family  Occupation:   Prior Level of Function: ind  Current Level of Function: ind    Imaging, none    Medical History:   Past Medical History:   Diagnosis Date    GERD (gastroesophageal reflux disease)     Prediabetes        Surgical History:   Madan More  has a past surgical history that includes Hysterectomy; Knee surgery; and Esophagogastroduodenoscopy (N/A, 11/19/2018).    Medications:   Madan has a current  medication list which includes the following prescription(s): aspirin, atorvastatin, cyclobenzaprine, fluticasone propionate, glipizide, methylprednisolone, naproxen, omeprazole, and topiramate.    Allergies:   Review of patient's allergies indicates:   Allergen Reactions    Penicillins Hives and Swelling        Pts goals: alleviate pain/numbness, return to PLOF    Objective       CMS Impairment/Limitation/Restriction for FOTO Survey    Therapist reviewed FOTO scores for Madan More on 12/22/2020.   FOTO documents entered into [employer] - see Media section.    Limitation Score: Will assess ASAP once system is in place         Posture: fair, rounded shoulders     Shoulder ROM: WNL bilateral but painful with left side    Wrist ROM: flexion R 110 deg L 90 deg, extension R 100 deg L 80 deg  Elbow ROM: flexion R 140 L 135    Cervical Spine AROM:    Pain   FB 50 deg no   BB 70 deg no   RSB 30 deg no   LSB 20 deg  yes   RR 55 deg no   LR 55 deg  yes     Strength:  Muscle (Myotome) Right Left   Cervical SB 5 3+   Shoulder Abduction 5 3+   Elbow Flexors (C5) 5 4   Wrist Extensors (C6) 5 3+   Elbow Extensors (C7) 5 4   ER (arm at side) 5 4   IR (arm at side) 5 5    strength 42 lbs 21 lbs     Sensation: Deminished to LUE    Reflexes: NT    Special Test: Compression/Spurlings negative, Distraction caused relief of symptoms     Joint Mobility: limited cervical and shoulder    Upper Limb Tension Test: Positive radial and median nerve test, ulnar negative     Muscle Length: NT    Palpation:  Increased TTP and ROS at left elbow    TREATMENT     Treatment Time In: 0120 PM  Treatment Time Out: 0130 PM  Total Treatment time separate from Evaluation: 10 minutes    Madan received therapeutic exercises to develop strength, endurance, ROM, flexibility, posture and core stabilization for 10 minutes including:    HEP review and patient education  Radial nerve floss/glide  Upper trap stretch  Wrist extensor stretch    Possible  next visit: Ball flexion stretch, pulleys, TB strengthening, manual techniques including mobilizations/STM, modalities as needed, Dry needling    Madan received hot pack for 5 minutes to left shoulder/upper arm.      Home Exercises and Patient Education Provided    Education provided:   - HEP provided and reviewed  - Anatomy and Physiology pertaining to current condition  - Possible response to exercise  - Importance of PT compliance      Written Home Exercises Provided: yes.  Exercises were reviewed and Madan was able to demonstrate them prior to the end of the session.  Madan demonstrated good  understanding of the education provided.     See EMR under Patient Instructions for exercises provided 12/22/2020.    Assessment     Pt is a 50 y.o. female referred to outpatient Physical Therapy with a medical diagnosis of cervical radiculopathy and ulnar neuropathy. Upon assessment, Pt demonstrates decreased strength, ROM of LUE, slightly limited cervical AROM, decreased  strength, numbness and tingling in LUE, and decreased activity tolerance. Based on these current impairments, she would benefit from continuance at this time. She responded well to initial cervical distractions and was positive with ULTT.    Pt prognosis is Good due to personal factors and co-morbidities listed below.   Pt will benefit from skilled outpatient Physical Therapy to address the deficits stated above and in the chart below, provide pt/family education, and to maximize pt's level of independence.     Plan of care discussed with patient: Yes  Pt's spiritual, cultural and educational needs considered and patient is agreeable to the plan of care and goals as stated below:     Anticipated Barriers for therapy: none    Medical Necessity is demonstrated by the following  History  Co-morbidities and personal factors that may impact the plan of care Co-morbidities:   hyperlipidemia    Personal Factors:   no deficits     low    Examination  Body Structures and Functions, activity limitations and participation restrictions that may impact the plan of care Body Regions:   neck  upper extremities    Body Systems:    gross symmetry  ROM  strength  gross coordinated movement  transfers  transitions    Participation Restrictions:   ADLs, recreational    Activity limitations:   Learning and applying knowledge  no deficits    General Tasks and Commands  no deficits    Communication  no deficits    Mobility  lifting and carrying objects  fine hand use (grasping/picking up)  driving (bike, car, motorcycle)    Self care  no deficits    Domestic Life  doing house work (cleaning house, washing dishes, laundry)  assisting others    Interactions/Relationships  no deficits    Life Areas  no deficits    Community and Social Life  community life  recreation and leisure         low   Clinical Presentation stable and uncomplicated low   Decision Making/ Complexity Score: low     Goals:  Short Term Goals: In 4 weeks   1.Pt to be educated on HEP.  2.Patient to increase GH ROM to full without pain to improve functional mobility.  3.Patient to increase strength by 1/2 grade to improve functional tasks.  4.Patient to have pain less than 5/10 at all times to improve quality of movement.   5. Pt to be educated on postural and body mechanics awareness.    Long Term Goals: In 8 weeks  1. Patient to demo increase in UE strength to full to improve functional tasks.  2. Patient to have decreased pain to 2/10 or less at all times to improve quality of movement.   3. Patient to demo increase neck and UE ROM to full to improve functional mobility.  4. Patient to perform daily activities including lifting/driving without limitation.      Plan     Plan of care Certification: 12/22/2020 to 2/16/2021.    Possible next visit: Ball flexion stretch, pulleys, TB strengthening, manual techniques including mobilizations/STM, modalities as needed, Dry needling    Outpatient Physical  Therapy 2 times weekly for 8 weeks to include the following interventions: Cervical/Lumbar Traction, Electrical Stimulation IFC TENS, Manual Therapy, Patient Education, Therapeutic Activites, Therapeutic Exercise and Dry Needling.     Pan Wong, PT    Thank you for this referral.    These services are reasonable and necessary for the conditions set forth above while under my care.

## 2021-01-04 ENCOUNTER — PATIENT MESSAGE (OUTPATIENT)
Dept: ADMINISTRATIVE | Facility: HOSPITAL | Age: 51
End: 2021-01-04

## 2021-01-12 ENCOUNTER — CLINICAL SUPPORT (OUTPATIENT)
Dept: REHABILITATION | Facility: HOSPITAL | Age: 51
End: 2021-01-12
Payer: MEDICAID

## 2021-01-12 DIAGNOSIS — M25.632 DECREASED RANGE OF MOTION OF LEFT WRIST: ICD-10-CM

## 2021-01-12 DIAGNOSIS — R29.3 POSTURE ABNORMALITY: ICD-10-CM

## 2021-01-12 DIAGNOSIS — R29.898 WEAKNESS OF LEFT ARM: ICD-10-CM

## 2021-01-12 PROCEDURE — 97110 THERAPEUTIC EXERCISES: CPT | Mod: PN

## 2021-01-19 ENCOUNTER — CLINICAL SUPPORT (OUTPATIENT)
Dept: REHABILITATION | Facility: HOSPITAL | Age: 51
End: 2021-01-19
Payer: MEDICAID

## 2021-01-19 DIAGNOSIS — R29.898 WEAKNESS OF LEFT ARM: ICD-10-CM

## 2021-01-19 DIAGNOSIS — R29.3 POSTURE ABNORMALITY: ICD-10-CM

## 2021-01-19 DIAGNOSIS — M25.632 DECREASED RANGE OF MOTION OF LEFT WRIST: ICD-10-CM

## 2021-01-19 PROCEDURE — 97110 THERAPEUTIC EXERCISES: CPT | Mod: PN

## 2021-01-22 ENCOUNTER — CLINICAL SUPPORT (OUTPATIENT)
Dept: REHABILITATION | Facility: HOSPITAL | Age: 51
End: 2021-01-22
Payer: MEDICAID

## 2021-01-22 DIAGNOSIS — M25.632 DECREASED RANGE OF MOTION OF LEFT WRIST: ICD-10-CM

## 2021-01-22 DIAGNOSIS — R29.898 WEAKNESS OF LEFT ARM: ICD-10-CM

## 2021-01-22 DIAGNOSIS — R29.3 POSTURE ABNORMALITY: ICD-10-CM

## 2021-01-22 PROCEDURE — 97110 THERAPEUTIC EXERCISES: CPT | Mod: PN

## 2021-01-26 ENCOUNTER — CLINICAL SUPPORT (OUTPATIENT)
Dept: REHABILITATION | Facility: HOSPITAL | Age: 51
End: 2021-01-26
Payer: MEDICAID

## 2021-01-26 DIAGNOSIS — R29.898 WEAKNESS OF LEFT ARM: ICD-10-CM

## 2021-01-26 DIAGNOSIS — M25.632 DECREASED RANGE OF MOTION OF LEFT WRIST: ICD-10-CM

## 2021-01-26 DIAGNOSIS — R29.3 POSTURE ABNORMALITY: ICD-10-CM

## 2021-01-26 PROCEDURE — 97110 THERAPEUTIC EXERCISES: CPT | Mod: PN

## 2021-01-26 PROCEDURE — 97140 MANUAL THERAPY 1/> REGIONS: CPT | Mod: PN

## 2021-01-26 PROCEDURE — 97014 ELECTRIC STIMULATION THERAPY: CPT | Mod: PN

## 2021-02-02 ENCOUNTER — CLINICAL SUPPORT (OUTPATIENT)
Dept: REHABILITATION | Facility: HOSPITAL | Age: 51
End: 2021-02-02
Payer: MEDICAID

## 2021-02-02 DIAGNOSIS — R29.898 WEAKNESS OF LEFT ARM: ICD-10-CM

## 2021-02-02 DIAGNOSIS — R29.3 POSTURE ABNORMALITY: ICD-10-CM

## 2021-02-02 DIAGNOSIS — M25.632 DECREASED RANGE OF MOTION OF LEFT WRIST: ICD-10-CM

## 2021-02-02 PROCEDURE — 97110 THERAPEUTIC EXERCISES: CPT | Mod: PN

## 2021-02-02 PROCEDURE — 97014 ELECTRIC STIMULATION THERAPY: CPT | Mod: PN

## 2021-02-05 ENCOUNTER — CLINICAL SUPPORT (OUTPATIENT)
Dept: REHABILITATION | Facility: HOSPITAL | Age: 51
End: 2021-02-05
Payer: MEDICAID

## 2021-02-05 DIAGNOSIS — R29.3 POSTURE ABNORMALITY: ICD-10-CM

## 2021-02-05 DIAGNOSIS — M25.632 DECREASED RANGE OF MOTION OF LEFT WRIST: ICD-10-CM

## 2021-02-05 DIAGNOSIS — R29.898 WEAKNESS OF LEFT ARM: ICD-10-CM

## 2021-02-05 PROCEDURE — 97140 MANUAL THERAPY 1/> REGIONS: CPT | Mod: PN

## 2021-02-05 PROCEDURE — 97014 ELECTRIC STIMULATION THERAPY: CPT | Mod: PN

## 2021-02-05 PROCEDURE — 97110 THERAPEUTIC EXERCISES: CPT | Mod: PN

## 2021-02-09 ENCOUNTER — CLINICAL SUPPORT (OUTPATIENT)
Dept: REHABILITATION | Facility: HOSPITAL | Age: 51
End: 2021-02-09
Payer: MEDICAID

## 2021-02-09 ENCOUNTER — TELEPHONE (OUTPATIENT)
Dept: FAMILY MEDICINE | Facility: CLINIC | Age: 51
End: 2021-02-09

## 2021-02-09 DIAGNOSIS — R29.898 WEAKNESS OF LEFT ARM: ICD-10-CM

## 2021-02-09 DIAGNOSIS — M25.632 DECREASED RANGE OF MOTION OF LEFT WRIST: ICD-10-CM

## 2021-02-09 DIAGNOSIS — R29.3 POSTURE ABNORMALITY: ICD-10-CM

## 2021-02-09 PROCEDURE — 97140 MANUAL THERAPY 1/> REGIONS: CPT | Mod: PN,CQ

## 2021-02-09 PROCEDURE — 97110 THERAPEUTIC EXERCISES: CPT | Mod: PN,CQ

## 2021-02-11 ENCOUNTER — LAB VISIT (OUTPATIENT)
Dept: LAB | Facility: HOSPITAL | Age: 51
End: 2021-02-11
Attending: INTERNAL MEDICINE
Payer: MEDICAID

## 2021-02-11 DIAGNOSIS — R73.03 PREDIABETES: ICD-10-CM

## 2021-02-11 LAB
ANION GAP SERPL CALC-SCNC: 11 MMOL/L (ref 8–16)
BUN SERPL-MCNC: 8 MG/DL (ref 6–20)
CALCIUM SERPL-MCNC: 8.7 MG/DL (ref 8.7–10.5)
CHLORIDE SERPL-SCNC: 106 MMOL/L (ref 95–110)
CO2 SERPL-SCNC: 25 MMOL/L (ref 23–29)
CREAT SERPL-MCNC: 0.7 MG/DL (ref 0.5–1.4)
EST. GFR  (AFRICAN AMERICAN): >60 ML/MIN/1.73 M^2
EST. GFR  (NON AFRICAN AMERICAN): >60 ML/MIN/1.73 M^2
GLUCOSE SERPL-MCNC: 94 MG/DL (ref 70–110)
POTASSIUM SERPL-SCNC: 3.7 MMOL/L (ref 3.5–5.1)
SODIUM SERPL-SCNC: 142 MMOL/L (ref 136–145)

## 2021-02-11 PROCEDURE — 36415 COLL VENOUS BLD VENIPUNCTURE: CPT | Mod: PN

## 2021-02-11 PROCEDURE — 80048 BASIC METABOLIC PNL TOTAL CA: CPT

## 2021-02-11 PROCEDURE — 83036 HEMOGLOBIN GLYCOSYLATED A1C: CPT

## 2021-02-12 LAB
ESTIMATED AVG GLUCOSE: 128 MG/DL (ref 68–131)
HBA1C MFR BLD: 6.1 % (ref 4–5.6)

## 2021-02-15 ENCOUNTER — PATIENT MESSAGE (OUTPATIENT)
Dept: FAMILY MEDICINE | Facility: CLINIC | Age: 51
End: 2021-02-15

## 2021-02-19 ENCOUNTER — CLINICAL SUPPORT (OUTPATIENT)
Dept: REHABILITATION | Facility: HOSPITAL | Age: 51
End: 2021-02-19
Payer: MEDICAID

## 2021-02-19 DIAGNOSIS — M25.632 DECREASED RANGE OF MOTION OF LEFT WRIST: ICD-10-CM

## 2021-02-19 DIAGNOSIS — R29.898 WEAKNESS OF LEFT ARM: ICD-10-CM

## 2021-02-19 DIAGNOSIS — R29.3 POSTURE ABNORMALITY: ICD-10-CM

## 2021-02-19 PROCEDURE — 97110 THERAPEUTIC EXERCISES: CPT | Mod: PN

## 2021-02-23 ENCOUNTER — CLINICAL SUPPORT (OUTPATIENT)
Dept: REHABILITATION | Facility: HOSPITAL | Age: 51
End: 2021-02-23
Payer: MEDICAID

## 2021-02-23 DIAGNOSIS — R29.898 WEAKNESS OF LEFT ARM: ICD-10-CM

## 2021-02-23 DIAGNOSIS — M25.632 DECREASED RANGE OF MOTION OF LEFT WRIST: ICD-10-CM

## 2021-02-23 DIAGNOSIS — R29.3 POSTURE ABNORMALITY: ICD-10-CM

## 2021-02-23 PROCEDURE — 97110 THERAPEUTIC EXERCISES: CPT | Mod: PN,CQ

## 2021-03-05 ENCOUNTER — PATIENT MESSAGE (OUTPATIENT)
Dept: FAMILY MEDICINE | Facility: CLINIC | Age: 51
End: 2021-03-05

## 2021-03-06 ENCOUNTER — PATIENT MESSAGE (OUTPATIENT)
Dept: FAMILY MEDICINE | Facility: CLINIC | Age: 51
End: 2021-03-06

## 2021-03-08 ENCOUNTER — PATIENT MESSAGE (OUTPATIENT)
Dept: FAMILY MEDICINE | Facility: CLINIC | Age: 51
End: 2021-03-08

## 2021-03-08 ENCOUNTER — TELEPHONE (OUTPATIENT)
Dept: FAMILY MEDICINE | Facility: CLINIC | Age: 51
End: 2021-03-08

## 2021-03-08 ENCOUNTER — TELEPHONE (OUTPATIENT)
Dept: ORTHOPEDICS | Facility: CLINIC | Age: 51
End: 2021-03-08

## 2021-03-09 ENCOUNTER — PATIENT MESSAGE (OUTPATIENT)
Dept: FAMILY MEDICINE | Facility: CLINIC | Age: 51
End: 2021-03-09

## 2021-03-09 ENCOUNTER — PATIENT OUTREACH (OUTPATIENT)
Dept: ADMINISTRATIVE | Facility: OTHER | Age: 51
End: 2021-03-09

## 2021-03-09 DIAGNOSIS — G89.29 ELBOW PAIN, CHRONIC, LEFT: Primary | ICD-10-CM

## 2021-03-09 DIAGNOSIS — M25.522 ELBOW PAIN, CHRONIC, LEFT: Primary | ICD-10-CM

## 2021-03-09 DIAGNOSIS — Z12.31 ENCOUNTER FOR SCREENING MAMMOGRAM FOR MALIGNANT NEOPLASM OF BREAST: Primary | ICD-10-CM

## 2021-03-10 ENCOUNTER — OFFICE VISIT (OUTPATIENT)
Dept: ORTHOPEDICS | Facility: CLINIC | Age: 51
End: 2021-03-10
Payer: MEDICAID

## 2021-03-10 ENCOUNTER — TELEPHONE (OUTPATIENT)
Dept: ORTHOPEDICS | Facility: CLINIC | Age: 51
End: 2021-03-10

## 2021-03-10 VITALS — WEIGHT: 219.56 LBS | TEMPERATURE: 98 F | HEIGHT: 61 IN | BODY MASS INDEX: 41.45 KG/M2

## 2021-03-10 DIAGNOSIS — M25.522 LEFT ELBOW PAIN: Primary | ICD-10-CM

## 2021-03-10 DIAGNOSIS — G56.22 ULNAR NEUROPATHY AT ELBOW OF LEFT UPPER EXTREMITY: Primary | ICD-10-CM

## 2021-03-10 DIAGNOSIS — G56.22 ULNAR NEUROPATHY OF LEFT UPPER EXTREMITY: ICD-10-CM

## 2021-03-10 PROCEDURE — 20551 NJX 1 TENDON ORIGIN/INSJ: CPT | Mod: PBBFAC,PN | Performed by: ORTHOPAEDIC SURGERY

## 2021-03-10 PROCEDURE — 20551 PR INJECT TENDON ORIGIN/INSERT: ICD-10-PCS | Mod: S$PBB,LT,, | Performed by: ORTHOPAEDIC SURGERY

## 2021-03-10 PROCEDURE — 99203 PR OFFICE/OUTPT VISIT, NEW, LEVL III, 30-44 MIN: ICD-10-PCS | Mod: S$PBB,25,, | Performed by: ORTHOPAEDIC SURGERY

## 2021-03-10 PROCEDURE — 20551 NJX 1 TENDON ORIGIN/INSJ: CPT | Mod: S$PBB,LT,, | Performed by: ORTHOPAEDIC SURGERY

## 2021-03-10 PROCEDURE — 99203 OFFICE O/P NEW LOW 30 MIN: CPT | Mod: S$PBB,25,, | Performed by: ORTHOPAEDIC SURGERY

## 2021-03-10 PROCEDURE — 99999 PR PBB SHADOW E&M-EST. PATIENT-LVL III: ICD-10-PCS | Mod: PBBFAC,,, | Performed by: ORTHOPAEDIC SURGERY

## 2021-03-10 PROCEDURE — 99999 PR PBB SHADOW E&M-EST. PATIENT-LVL III: CPT | Mod: PBBFAC,,, | Performed by: ORTHOPAEDIC SURGERY

## 2021-03-10 PROCEDURE — 99213 OFFICE O/P EST LOW 20 MIN: CPT | Mod: PBBFAC,PN | Performed by: ORTHOPAEDIC SURGERY

## 2021-03-10 RX ORDER — IBUPROFEN 600 MG/1
600 TABLET ORAL 2 TIMES DAILY WITH MEALS
Qty: 60 TABLET | Refills: 1 | Status: SHIPPED | OUTPATIENT
Start: 2021-03-10 | End: 2021-11-16

## 2021-03-10 RX ORDER — TRIAMCINOLONE ACETONIDE 40 MG/ML
20 INJECTION, SUSPENSION INTRA-ARTICULAR; INTRAMUSCULAR
Status: COMPLETED | OUTPATIENT
Start: 2021-03-10 | End: 2021-03-10

## 2021-03-10 RX ADMIN — TRIAMCINOLONE ACETONIDE 20 MG: 40 INJECTION, SUSPENSION INTRA-ARTICULAR; INTRAMUSCULAR at 01:03

## 2021-03-25 ENCOUNTER — TELEPHONE (OUTPATIENT)
Dept: ADMINISTRATIVE | Facility: HOSPITAL | Age: 51
End: 2021-03-25

## 2021-03-26 ENCOUNTER — HOSPITAL ENCOUNTER (OUTPATIENT)
Dept: RADIOLOGY | Facility: HOSPITAL | Age: 51
Discharge: HOME | End: 2021-03-26
Attending: INTERNAL MEDICINE
Payer: MEDICAID

## 2021-03-26 VITALS — WEIGHT: 219 LBS | BODY MASS INDEX: 41.35 KG/M2 | HEIGHT: 61 IN

## 2021-03-26 DIAGNOSIS — Z12.31 ENCOUNTER FOR SCREENING MAMMOGRAM FOR MALIGNANT NEOPLASM OF BREAST: ICD-10-CM

## 2021-03-26 PROCEDURE — 77067 SCR MAMMO BI INCL CAD: CPT | Mod: 26,,, | Performed by: RADIOLOGY

## 2021-03-26 PROCEDURE — 77067 MAMMO DIGITAL SCREENING BILAT WITH TOMO: ICD-10-PCS | Mod: 26,,, | Performed by: RADIOLOGY

## 2021-03-26 PROCEDURE — 77063 BREAST TOMOSYNTHESIS BI: CPT | Mod: 26,,, | Performed by: RADIOLOGY

## 2021-03-26 PROCEDURE — 77063 MAMMO DIGITAL SCREENING BILAT WITH TOMO: ICD-10-PCS | Mod: 26,,, | Performed by: RADIOLOGY

## 2021-03-26 PROCEDURE — 77067 SCR MAMMO BI INCL CAD: CPT | Mod: TC,PO

## 2021-04-26 ENCOUNTER — PROCEDURE VISIT (OUTPATIENT)
Dept: PHYSICAL MEDICINE AND REHAB | Facility: CLINIC | Age: 51
End: 2021-04-26
Payer: MEDICAID

## 2021-04-26 DIAGNOSIS — G56.22 ULNAR NEUROPATHY AT ELBOW OF LEFT UPPER EXTREMITY: ICD-10-CM

## 2021-04-26 PROCEDURE — 95885 PR MUSC TST DONE W/NERV TST LIM: ICD-10-PCS | Mod: 26,S$PBB,, | Performed by: PHYSICAL MEDICINE & REHABILITATION

## 2021-04-26 PROCEDURE — 95912 NRV CNDJ TEST 11-12 STUDIES: CPT | Mod: 26,S$PBB,, | Performed by: PHYSICAL MEDICINE & REHABILITATION

## 2021-04-26 PROCEDURE — 95885 MUSC TST DONE W/NERV TST LIM: CPT | Mod: PBBFAC | Performed by: PHYSICAL MEDICINE & REHABILITATION

## 2021-04-26 PROCEDURE — 95912 PR NERVE CONDUCTION STUDY; 11 -12 STUDIES: ICD-10-PCS | Mod: 26,S$PBB,, | Performed by: PHYSICAL MEDICINE & REHABILITATION

## 2021-04-26 PROCEDURE — 95885 MUSC TST DONE W/NERV TST LIM: CPT | Mod: 26,S$PBB,, | Performed by: PHYSICAL MEDICINE & REHABILITATION

## 2021-04-26 PROCEDURE — 95912 NRV CNDJ TEST 11-12 STUDIES: CPT | Mod: PBBFAC | Performed by: PHYSICAL MEDICINE & REHABILITATION

## 2021-05-05 ENCOUNTER — DOCUMENTATION ONLY (OUTPATIENT)
Dept: REHABILITATION | Facility: HOSPITAL | Age: 51
End: 2021-05-05

## 2021-05-05 PROBLEM — R29.3 POSTURE ABNORMALITY: Status: RESOLVED | Noted: 2020-12-22 | Resolved: 2021-05-05

## 2021-05-05 PROBLEM — M25.632 DECREASED RANGE OF MOTION OF LEFT WRIST: Status: RESOLVED | Noted: 2020-12-22 | Resolved: 2021-05-05

## 2021-05-05 PROBLEM — R29.898 WEAKNESS OF LEFT ARM: Status: RESOLVED | Noted: 2020-12-22 | Resolved: 2021-05-05

## 2021-05-12 ENCOUNTER — OFFICE VISIT (OUTPATIENT)
Dept: ORTHOPEDICS | Facility: CLINIC | Age: 51
End: 2021-05-12
Payer: MEDICAID

## 2021-05-12 VITALS — HEIGHT: 61 IN | BODY MASS INDEX: 41.35 KG/M2 | WEIGHT: 219 LBS

## 2021-05-12 DIAGNOSIS — M25.522 LEFT ELBOW PAIN: ICD-10-CM

## 2021-05-12 DIAGNOSIS — M77.12 LATERAL EPICONDYLITIS OF LEFT ELBOW: ICD-10-CM

## 2021-05-12 DIAGNOSIS — G89.29 ELBOW PAIN, CHRONIC, LEFT: ICD-10-CM

## 2021-05-12 DIAGNOSIS — M25.522 ELBOW PAIN, CHRONIC, LEFT: ICD-10-CM

## 2021-05-12 PROBLEM — G56.22 ULNAR NEUROPATHY OF LEFT UPPER EXTREMITY: Status: RESOLVED | Noted: 2021-03-10 | Resolved: 2021-05-12

## 2021-05-12 PROCEDURE — 99999 PR PBB SHADOW E&M-EST. PATIENT-LVL III: ICD-10-PCS | Mod: PBBFAC,,, | Performed by: ORTHOPAEDIC SURGERY

## 2021-05-12 PROCEDURE — 99213 PR OFFICE/OUTPT VISIT, EST, LEVL III, 20-29 MIN: ICD-10-PCS | Mod: S$PBB,,, | Performed by: ORTHOPAEDIC SURGERY

## 2021-05-12 PROCEDURE — 99213 OFFICE O/P EST LOW 20 MIN: CPT | Mod: PBBFAC,PN | Performed by: ORTHOPAEDIC SURGERY

## 2021-05-12 PROCEDURE — 99999 PR PBB SHADOW E&M-EST. PATIENT-LVL III: CPT | Mod: PBBFAC,,, | Performed by: ORTHOPAEDIC SURGERY

## 2021-05-12 PROCEDURE — 99213 OFFICE O/P EST LOW 20 MIN: CPT | Mod: S$PBB,,, | Performed by: ORTHOPAEDIC SURGERY

## 2021-05-31 ENCOUNTER — TELEPHONE (OUTPATIENT)
Dept: ORTHOPEDICS | Facility: CLINIC | Age: 51
End: 2021-05-31

## 2021-06-04 ENCOUNTER — TELEPHONE (OUTPATIENT)
Dept: ORTHOPEDICS | Facility: CLINIC | Age: 51
End: 2021-06-04

## 2021-06-04 DIAGNOSIS — M25.522 LEFT ELBOW PAIN: Primary | ICD-10-CM

## 2021-06-10 ENCOUNTER — TELEPHONE (OUTPATIENT)
Dept: ORTHOPEDICS | Facility: CLINIC | Age: 51
End: 2021-06-10

## 2021-06-11 ENCOUNTER — TELEPHONE (OUTPATIENT)
Dept: ORTHOPEDICS | Facility: CLINIC | Age: 51
End: 2021-06-11

## 2021-06-24 ENCOUNTER — TELEPHONE (OUTPATIENT)
Dept: ORTHOPEDICS | Facility: CLINIC | Age: 51
End: 2021-06-24

## 2021-07-15 ENCOUNTER — TELEPHONE (OUTPATIENT)
Dept: ORTHOPEDICS | Facility: CLINIC | Age: 51
End: 2021-07-15

## 2021-07-15 DIAGNOSIS — M25.529 ELBOW PAIN, UNSPECIFIED LATERALITY: ICD-10-CM

## 2021-07-15 DIAGNOSIS — M25.522 LEFT ELBOW PAIN: ICD-10-CM

## 2021-07-16 ENCOUNTER — TELEPHONE (OUTPATIENT)
Dept: PODIATRY | Facility: CLINIC | Age: 51
End: 2021-07-16

## 2021-07-26 ENCOUNTER — TELEPHONE (OUTPATIENT)
Dept: ORTHOPEDICS | Facility: CLINIC | Age: 51
End: 2021-07-26

## 2021-07-27 ENCOUNTER — TELEPHONE (OUTPATIENT)
Dept: ORTHOPEDICS | Facility: CLINIC | Age: 51
End: 2021-07-27

## 2021-08-20 ENCOUNTER — TELEPHONE (OUTPATIENT)
Dept: ORTHOPEDICS | Facility: CLINIC | Age: 51
End: 2021-08-20

## 2021-08-23 ENCOUNTER — TELEPHONE (OUTPATIENT)
Dept: ORTHOPEDICS | Facility: CLINIC | Age: 51
End: 2021-08-23

## 2021-08-26 ENCOUNTER — TELEPHONE (OUTPATIENT)
Dept: ORTHOPEDICS | Facility: CLINIC | Age: 51
End: 2021-08-26

## 2021-09-22 ENCOUNTER — PATIENT OUTREACH (OUTPATIENT)
Dept: ADMINISTRATIVE | Facility: OTHER | Age: 51
End: 2021-09-22

## 2021-09-23 ENCOUNTER — OFFICE VISIT (OUTPATIENT)
Dept: ORTHOPEDICS | Facility: CLINIC | Age: 51
End: 2021-09-23
Payer: MEDICAID

## 2021-09-23 DIAGNOSIS — M77.12 LATERAL EPICONDYLITIS OF LEFT ELBOW: Primary | ICD-10-CM

## 2021-09-23 PROCEDURE — 99213 OFFICE O/P EST LOW 20 MIN: CPT | Mod: 95,,, | Performed by: ORTHOPAEDIC SURGERY

## 2021-09-23 PROCEDURE — 99213 PR OFFICE/OUTPT VISIT, EST, LEVL III, 20-29 MIN: ICD-10-PCS | Mod: 95,,, | Performed by: ORTHOPAEDIC SURGERY

## 2021-10-04 ENCOUNTER — TELEPHONE (OUTPATIENT)
Dept: ORTHOPEDICS | Facility: CLINIC | Age: 51
End: 2021-10-04

## 2021-10-04 DIAGNOSIS — M25.561 RIGHT KNEE PAIN, UNSPECIFIED CHRONICITY: Primary | ICD-10-CM

## 2021-10-05 ENCOUNTER — OFFICE VISIT (OUTPATIENT)
Dept: ORTHOPEDICS | Facility: CLINIC | Age: 51
End: 2021-10-05
Payer: MEDICAID

## 2021-10-05 VITALS — WEIGHT: 218.94 LBS | BODY MASS INDEX: 41.34 KG/M2 | HEIGHT: 61 IN

## 2021-10-05 DIAGNOSIS — M17.11 PRIMARY OSTEOARTHRITIS OF RIGHT KNEE: Primary | ICD-10-CM

## 2021-10-05 PROCEDURE — 99213 PR OFFICE/OUTPT VISIT, EST, LEVL III, 20-29 MIN: ICD-10-PCS | Mod: S$PBB,25,, | Performed by: ORTHOPAEDIC SURGERY

## 2021-10-05 PROCEDURE — 20610 DRAIN/INJ JOINT/BURSA W/O US: CPT | Mod: PBBFAC,PN,RT | Performed by: ORTHOPAEDIC SURGERY

## 2021-10-05 PROCEDURE — 99213 OFFICE O/P EST LOW 20 MIN: CPT | Mod: S$PBB,25,, | Performed by: ORTHOPAEDIC SURGERY

## 2021-10-05 PROCEDURE — 99999 PR PBB SHADOW E&M-EST. PATIENT-LVL III: CPT | Mod: PBBFAC,,, | Performed by: ORTHOPAEDIC SURGERY

## 2021-10-05 PROCEDURE — 20610 LARGE JOINT ASPIRATION/INJECTION: R KNEE: ICD-10-PCS | Mod: S$PBB,RT,, | Performed by: ORTHOPAEDIC SURGERY

## 2021-10-05 PROCEDURE — 99213 OFFICE O/P EST LOW 20 MIN: CPT | Mod: PBBFAC,PN | Performed by: ORTHOPAEDIC SURGERY

## 2021-10-05 PROCEDURE — 99999 PR PBB SHADOW E&M-EST. PATIENT-LVL III: ICD-10-PCS | Mod: PBBFAC,,, | Performed by: ORTHOPAEDIC SURGERY

## 2021-10-05 RX ORDER — TRIAMCINOLONE ACETONIDE 40 MG/ML
40 INJECTION, SUSPENSION INTRA-ARTICULAR; INTRAMUSCULAR
Status: DISCONTINUED | OUTPATIENT
Start: 2021-10-05 | End: 2021-10-05 | Stop reason: HOSPADM

## 2021-10-05 RX ADMIN — TRIAMCINOLONE ACETONIDE 40 MG: 40 INJECTION, SUSPENSION INTRA-ARTICULAR; INTRAMUSCULAR at 09:10

## 2021-10-11 ENCOUNTER — CLINICAL SUPPORT (OUTPATIENT)
Dept: REHABILITATION | Facility: HOSPITAL | Age: 51
End: 2021-10-11
Payer: MEDICAID

## 2021-10-11 DIAGNOSIS — M25.561 CHRONIC PAIN OF RIGHT KNEE: ICD-10-CM

## 2021-10-11 DIAGNOSIS — M17.11 PRIMARY OSTEOARTHRITIS OF RIGHT KNEE: ICD-10-CM

## 2021-10-11 DIAGNOSIS — Z74.09 IMPAIRED FUNCTIONAL MOBILITY, BALANCE, GAIT, AND ENDURANCE: ICD-10-CM

## 2021-10-11 DIAGNOSIS — R53.1 WEAKNESS: ICD-10-CM

## 2021-10-11 DIAGNOSIS — G89.29 CHRONIC PAIN OF RIGHT KNEE: ICD-10-CM

## 2021-10-11 PROCEDURE — 97110 THERAPEUTIC EXERCISES: CPT | Mod: PN | Performed by: PHYSICAL THERAPIST

## 2021-10-11 PROCEDURE — 97161 PT EVAL LOW COMPLEX 20 MIN: CPT | Mod: PN | Performed by: PHYSICAL THERAPIST

## 2021-10-18 ENCOUNTER — CLINICAL SUPPORT (OUTPATIENT)
Dept: REHABILITATION | Facility: HOSPITAL | Age: 51
End: 2021-10-18
Payer: MEDICAID

## 2021-10-18 DIAGNOSIS — R53.1 WEAKNESS: ICD-10-CM

## 2021-10-18 DIAGNOSIS — Z74.09 IMPAIRED FUNCTIONAL MOBILITY, BALANCE, GAIT, AND ENDURANCE: ICD-10-CM

## 2021-10-18 DIAGNOSIS — G89.29 CHRONIC PAIN OF RIGHT KNEE: ICD-10-CM

## 2021-10-18 DIAGNOSIS — M25.561 CHRONIC PAIN OF RIGHT KNEE: ICD-10-CM

## 2021-10-18 PROCEDURE — 97110 THERAPEUTIC EXERCISES: CPT | Mod: PN | Performed by: PHYSICAL THERAPIST

## 2021-10-20 ENCOUNTER — CLINICAL SUPPORT (OUTPATIENT)
Dept: REHABILITATION | Facility: HOSPITAL | Age: 51
End: 2021-10-20
Payer: MEDICAID

## 2021-10-20 DIAGNOSIS — R53.1 WEAKNESS: ICD-10-CM

## 2021-10-20 DIAGNOSIS — M25.561 CHRONIC PAIN OF RIGHT KNEE: ICD-10-CM

## 2021-10-20 DIAGNOSIS — Z74.09 IMPAIRED FUNCTIONAL MOBILITY, BALANCE, GAIT, AND ENDURANCE: ICD-10-CM

## 2021-10-20 DIAGNOSIS — G89.29 CHRONIC PAIN OF RIGHT KNEE: ICD-10-CM

## 2021-10-20 PROCEDURE — 97110 THERAPEUTIC EXERCISES: CPT | Mod: PN | Performed by: PHYSICAL THERAPIST

## 2021-10-27 ENCOUNTER — CLINICAL SUPPORT (OUTPATIENT)
Dept: REHABILITATION | Facility: HOSPITAL | Age: 51
End: 2021-10-27
Payer: MEDICAID

## 2021-10-27 DIAGNOSIS — Z74.09 IMPAIRED FUNCTIONAL MOBILITY, BALANCE, GAIT, AND ENDURANCE: ICD-10-CM

## 2021-10-27 DIAGNOSIS — R53.1 WEAKNESS: ICD-10-CM

## 2021-10-27 DIAGNOSIS — G89.29 CHRONIC PAIN OF RIGHT KNEE: ICD-10-CM

## 2021-10-27 DIAGNOSIS — M25.561 CHRONIC PAIN OF RIGHT KNEE: ICD-10-CM

## 2021-10-27 PROCEDURE — 97110 THERAPEUTIC EXERCISES: CPT | Mod: PN | Performed by: PHYSICAL THERAPIST

## 2021-11-08 ENCOUNTER — CLINICAL SUPPORT (OUTPATIENT)
Dept: REHABILITATION | Facility: HOSPITAL | Age: 51
End: 2021-11-08
Payer: MEDICAID

## 2021-11-08 DIAGNOSIS — R53.1 WEAKNESS: ICD-10-CM

## 2021-11-08 DIAGNOSIS — M25.561 CHRONIC PAIN OF RIGHT KNEE: ICD-10-CM

## 2021-11-08 DIAGNOSIS — G89.29 CHRONIC PAIN OF RIGHT KNEE: ICD-10-CM

## 2021-11-08 DIAGNOSIS — Z74.09 IMPAIRED FUNCTIONAL MOBILITY, BALANCE, GAIT, AND ENDURANCE: ICD-10-CM

## 2021-11-08 PROCEDURE — 97110 THERAPEUTIC EXERCISES: CPT | Mod: PN | Performed by: PHYSICAL THERAPIST

## 2021-11-10 ENCOUNTER — CLINICAL SUPPORT (OUTPATIENT)
Dept: REHABILITATION | Facility: HOSPITAL | Age: 51
End: 2021-11-10
Payer: MEDICAID

## 2021-11-10 DIAGNOSIS — M25.561 CHRONIC PAIN OF RIGHT KNEE: ICD-10-CM

## 2021-11-10 DIAGNOSIS — Z74.09 IMPAIRED FUNCTIONAL MOBILITY, BALANCE, GAIT, AND ENDURANCE: ICD-10-CM

## 2021-11-10 DIAGNOSIS — R53.1 WEAKNESS: ICD-10-CM

## 2021-11-10 DIAGNOSIS — G89.29 CHRONIC PAIN OF RIGHT KNEE: ICD-10-CM

## 2021-11-10 PROCEDURE — 97110 THERAPEUTIC EXERCISES: CPT | Mod: PN | Performed by: PHYSICAL THERAPIST

## 2021-11-15 ENCOUNTER — PATIENT OUTREACH (OUTPATIENT)
Dept: ADMINISTRATIVE | Facility: OTHER | Age: 51
End: 2021-11-15
Payer: MEDICAID

## 2021-11-16 ENCOUNTER — OFFICE VISIT (OUTPATIENT)
Dept: ORTHOPEDICS | Facility: CLINIC | Age: 51
End: 2021-11-16
Payer: MEDICAID

## 2021-11-16 VITALS — HEIGHT: 61 IN | BODY MASS INDEX: 41.34 KG/M2 | WEIGHT: 218.94 LBS

## 2021-11-16 DIAGNOSIS — M17.11 PRIMARY OSTEOARTHRITIS OF RIGHT KNEE: Primary | ICD-10-CM

## 2021-11-16 PROCEDURE — 99213 OFFICE O/P EST LOW 20 MIN: CPT | Mod: S$PBB,,, | Performed by: ORTHOPAEDIC SURGERY

## 2021-11-16 PROCEDURE — 99213 PR OFFICE/OUTPT VISIT, EST, LEVL III, 20-29 MIN: ICD-10-PCS | Mod: S$PBB,,, | Performed by: ORTHOPAEDIC SURGERY

## 2021-11-16 PROCEDURE — 99999 PR PBB SHADOW E&M-EST. PATIENT-LVL III: ICD-10-PCS | Mod: PBBFAC,,, | Performed by: ORTHOPAEDIC SURGERY

## 2021-11-16 PROCEDURE — 99999 PR PBB SHADOW E&M-EST. PATIENT-LVL III: CPT | Mod: PBBFAC,,, | Performed by: ORTHOPAEDIC SURGERY

## 2021-11-16 PROCEDURE — 99213 OFFICE O/P EST LOW 20 MIN: CPT | Mod: PBBFAC,PN | Performed by: ORTHOPAEDIC SURGERY

## 2021-11-17 ENCOUNTER — DOCUMENTATION ONLY (OUTPATIENT)
Dept: REHABILITATION | Facility: HOSPITAL | Age: 51
End: 2021-11-17
Payer: MEDICAID

## 2022-02-28 ENCOUNTER — PATIENT MESSAGE (OUTPATIENT)
Dept: ADMINISTRATIVE | Facility: HOSPITAL | Age: 52
End: 2022-02-28
Payer: MEDICAID

## 2022-05-31 ENCOUNTER — PATIENT MESSAGE (OUTPATIENT)
Dept: ADMINISTRATIVE | Facility: HOSPITAL | Age: 52
End: 2022-05-31
Payer: MEDICAID

## 2022-10-04 ENCOUNTER — HOSPITAL ENCOUNTER (OUTPATIENT)
Dept: RADIOLOGY | Facility: HOSPITAL | Age: 52
Discharge: HOME | End: 2022-10-04
Attending: ORTHOPAEDIC SURGERY
Payer: MEDICAID

## 2022-10-04 ENCOUNTER — TELEPHONE (OUTPATIENT)
Dept: ORTHOPEDICS | Facility: CLINIC | Age: 52
End: 2022-10-04
Payer: MEDICAID

## 2022-10-04 DIAGNOSIS — M17.11 PRIMARY OSTEOARTHRITIS OF RIGHT KNEE: ICD-10-CM

## 2022-10-04 PROCEDURE — 73564 X-RAY EXAM KNEE 4 OR MORE: CPT | Mod: TC,50,PO

## 2022-10-04 PROCEDURE — 73564 XR KNEE ORTHO BILAT WITH FLEXION: ICD-10-PCS | Mod: 26,50,, | Performed by: RADIOLOGY

## 2022-10-04 PROCEDURE — 73564 X-RAY EXAM KNEE 4 OR MORE: CPT | Mod: 26,50,, | Performed by: RADIOLOGY

## 2022-10-07 NOTE — PROGRESS NOTES
"Subjective:      Patient ID: Madan More is a 52 y.o. female.    Chief Complaint: Pain and Swelling of the Right Knee (Patient presents today as a new patient complaining of right knee pain. )      NICK  (Do)    Last seen by Dr. Lei on 11/16/21 for her right knee. She was doing well after right knee injection by him on 10/5/21. Known mild/moderate medial joint space narrowing in both knees.     She is here for follow up.     She thinks the last injection helped. She has constant right knee pain that is worse with standing and walking. She has locking and catching. No giving way. She rates her pain as a 6 on a scale of 1-10. Pain is aching in nature.     She has taken tylenol with some relief. Injection as above. No knee brace. No PT for right knee. No surgery on her right knee. History of left knee scope.       Past Medical History:   Diagnosis Date    GERD (gastroesophageal reflux disease)     Prediabetes        No current outpatient medications on file.    Review of patient's allergies indicates:   Allergen Reactions    Penicillins Hives and Swelling       Review of Systems   Constitutional: Negative for chills, fever, night sweats and weight gain.   Gastrointestinal:  Negative for bowel incontinence, nausea and vomiting.   Genitourinary:  Negative for bladder incontinence.   Neurological:  Negative for disturbances in coordination and loss of balance.         Objective:        Ht 5' 1" (1.549 m)   Wt 100.7 kg (222 lb 1.6 oz)   BMI 41.97 kg/m²     Ortho/SPM Exam    Body habitus is normal.   The patient walks without a limp.    Right knee exam:   Resisted SLR negative.   The skin over the knee is intact.  Knee effusion none  Tendernes is located medial  Range of motion- Flexion full, Extension full.     Ligament exam:              MCL trace laxity              Lachman intact              Post sag intact              LCL intact    Patellar apprehension negative.  Popliteal cyst negative  Patellar " crepitation absent.  Flexion/pinch positive.    Motor normal 5/5 strength in all tested muscle groups.   Sensory normal.    Left knee exam:   Resisted SLR negative.   The skin over the knee shows healed portal sites  Knee effusion none  Tendernes is located absent.  Range of motion- Flexion full, Extension full.     Ligament exam:              MCL trace laxity              Lachman intact              Post sag intact              LCL intact    Patellar apprehension negative.  Popliteal cyst negative  Patellar crepitation absent.  Flexion/pinch positive.    Motor normal 5/5 strength in all tested muscle groups.   Sensory normal.      XRAY INTERPRETATION:   X-rays of bilateral knees dated 10/4/22 are personally reviewed and show mild/moderate medial joint space narrowing in both knees.         Assessment:       Encounter Diagnosis   Name Primary?    Primary osteoarthritis of right knee Yes          Plan:       Madan was seen today for pain and swelling.    Diagnoses and all orders for this visit:    Primary osteoarthritis of right knee  -     Large Joint Aspiration/Injection: R knee    She thinks the last injection helped. Now with constant right knee pain that is worse with standing and walking. She has locking and catching. No giving way.    XRs of both knees show mild/moderate medial joint space narrowing.     Treatment options reviewed with patient along with above bilateral knee xrays. Following plan made:     - RIGHT knee injection done without complication. See procedure note.   - Continue on OTC tylenol prn. Reviewed dosing and side effects. Take with food.   - Given hinged knee brace. Will wear this prn comfort/support with prolonged walking.   - Can repeat injection every 3 months as needed. She prefers to f/u prn.     Follow up if symptoms worsen or fail to improve.

## 2022-10-11 ENCOUNTER — OFFICE VISIT (OUTPATIENT)
Dept: ORTHOPEDICS | Facility: CLINIC | Age: 52
End: 2022-10-11
Payer: MEDICAID

## 2022-10-11 VITALS — HEIGHT: 61 IN | WEIGHT: 222.13 LBS | BODY MASS INDEX: 41.94 KG/M2

## 2022-10-11 DIAGNOSIS — M17.11 PRIMARY OSTEOARTHRITIS OF RIGHT KNEE: Primary | ICD-10-CM

## 2022-10-11 PROCEDURE — 1159F MED LIST DOCD IN RCRD: CPT | Mod: CPTII,,, | Performed by: PHYSICIAN ASSISTANT

## 2022-10-11 PROCEDURE — 1160F PR REVIEW ALL MEDS BY PRESCRIBER/CLIN PHARMACIST DOCUMENTED: ICD-10-PCS | Mod: CPTII,,, | Performed by: PHYSICIAN ASSISTANT

## 2022-10-11 PROCEDURE — 1159F PR MEDICATION LIST DOCUMENTED IN MEDICAL RECORD: ICD-10-PCS | Mod: CPTII,,, | Performed by: PHYSICIAN ASSISTANT

## 2022-10-11 PROCEDURE — 99999 PR PBB SHADOW E&M-EST. PATIENT-LVL III: ICD-10-PCS | Mod: PBBFAC,,, | Performed by: PHYSICIAN ASSISTANT

## 2022-10-11 PROCEDURE — 99999 PR PBB SHADOW E&M-EST. PATIENT-LVL III: CPT | Mod: PBBFAC,,, | Performed by: PHYSICIAN ASSISTANT

## 2022-10-11 PROCEDURE — 20610 LARGE JOINT ASPIRATION/INJECTION: R KNEE: ICD-10-PCS | Mod: S$PBB,RT,, | Performed by: PHYSICIAN ASSISTANT

## 2022-10-11 PROCEDURE — 20610 DRAIN/INJ JOINT/BURSA W/O US: CPT | Mod: PBBFAC,PN | Performed by: PHYSICIAN ASSISTANT

## 2022-10-11 PROCEDURE — 99213 OFFICE O/P EST LOW 20 MIN: CPT | Mod: 25,S$PBB,, | Performed by: PHYSICIAN ASSISTANT

## 2022-10-11 PROCEDURE — 3008F BODY MASS INDEX DOCD: CPT | Mod: CPTII,,, | Performed by: PHYSICIAN ASSISTANT

## 2022-10-11 PROCEDURE — 99213 PR OFFICE/OUTPT VISIT, EST, LEVL III, 20-29 MIN: ICD-10-PCS | Mod: 25,S$PBB,, | Performed by: PHYSICIAN ASSISTANT

## 2022-10-11 PROCEDURE — 3008F PR BODY MASS INDEX (BMI) DOCUMENTED: ICD-10-PCS | Mod: CPTII,,, | Performed by: PHYSICIAN ASSISTANT

## 2022-10-11 PROCEDURE — 20610 DRAIN/INJ JOINT/BURSA W/O US: CPT | Mod: S$PBB,RT,, | Performed by: PHYSICIAN ASSISTANT

## 2022-10-11 PROCEDURE — 1160F RVW MEDS BY RX/DR IN RCRD: CPT | Mod: CPTII,,, | Performed by: PHYSICIAN ASSISTANT

## 2022-10-11 PROCEDURE — 99213 OFFICE O/P EST LOW 20 MIN: CPT | Mod: PBBFAC,PN | Performed by: PHYSICIAN ASSISTANT

## 2022-10-11 RX ORDER — TRIAMCINOLONE ACETONIDE 40 MG/ML
40 INJECTION, SUSPENSION INTRA-ARTICULAR; INTRAMUSCULAR
Status: DISCONTINUED | OUTPATIENT
Start: 2022-10-11 | End: 2022-10-11 | Stop reason: HOSPADM

## 2022-10-11 RX ADMIN — TRIAMCINOLONE ACETONIDE 40 MG: 40 INJECTION, SUSPENSION INTRA-ARTICULAR; INTRAMUSCULAR at 10:10

## 2022-10-11 NOTE — PATIENT INSTRUCTIONS
It was nice to meet you today! I am sorry that you are hurting so much.     You have some wear and tear in your knees (arthritis) and this is likely what is causing your pain.     The injection that I did today should give you some good relief of pain. It is normal to have some increased soreness over the next few days after an injection. Put ice on it and elevate. This will get better.    Wear the knee brace as needed for prolonged walking. This should give you added support and help with pain.     Continue on tylenol to help with pain/inflammation. Take as directed with food.     You can have an injection every 3 months as needed.     Please stay in touch and call me if you need anything. You can also send me a message in [facility].     Pao   [phone]

## 2022-10-11 NOTE — PROCEDURES
Large Joint Aspiration/Injection: R knee    Date/Time: 10/11/2022 10:30 AM  Performed by: Pao Castillo PA-C  Authorized by: Pao Castillo PA-C     Consent Done?:  Yes (Verbal)  Timeout: prior to procedure the correct patient, procedure, and site was verified    Location:  Knee  Site:  R knee  Medications:  40 mg triamcinolone acetonide 40 mg/mL     PROCEDURE NOTE:  RIGHT KNEE INJECTION    I have explained the risks, benefits, and alternatives of the procedure in detail.  The patient voices understanding and all questions have been answered.  The patient agrees to proceed as planned.    After a sterile prep of the skin using chloraprep one step, the area was sprayed with local topical anesthetic and then cleaned with alcohol. The RIGHT knee was injected through an inferior lateral approach with a combination of 2 cc 1% plain xylocaine and 40mg triamcinolone.  The patient is cautioned that immediate relief of pain is secondary to the local anesthetic and will be temporary. After the anesthetic wears off there may be a increase in pain that may last for a few hours or a few days and they should use ice to help alleviate this this pain.     If patient is diabetic, post injection elevation of blood sugar was discussed. Patient is to check blood sugar regularly and call PCP with any issues.     Patient tolerated the procedure well.

## 2023-06-16 ENCOUNTER — PATIENT MESSAGE (OUTPATIENT)
Dept: PODIATRY | Facility: CLINIC | Age: 53
End: 2023-06-16
Payer: MEDICAID

## 2024-01-03 DIAGNOSIS — M54.12 CERVICAL RADICULOPATHY: ICD-10-CM

## 2024-01-03 DIAGNOSIS — M47.12 OSTEOARTHRITIS OF CERVICAL SPINE WITH MYELOPATHY: ICD-10-CM

## 2024-01-03 DIAGNOSIS — M50.30 DDD (DEGENERATIVE DISC DISEASE), CERVICAL: Primary | ICD-10-CM

## 2024-01-29 ENCOUNTER — CLINICAL SUPPORT (OUTPATIENT)
Dept: REHABILITATION | Facility: HOSPITAL | Age: 54
End: 2024-01-29
Payer: MEDICAID

## 2024-01-29 DIAGNOSIS — R29.898 DECREASED ROM OF NECK: Primary | ICD-10-CM

## 2024-01-29 DIAGNOSIS — R29.898 DECREASED STRENGTH OF UPPER EXTREMITY: ICD-10-CM

## 2024-01-29 DIAGNOSIS — R68.89 DECREASED FUNCTIONAL ACTIVITY TOLERANCE: ICD-10-CM

## 2024-01-29 PROCEDURE — 97110 THERAPEUTIC EXERCISES: CPT | Mod: PN

## 2024-01-29 PROCEDURE — 97161 PT EVAL LOW COMPLEX 20 MIN: CPT | Mod: PN

## 2024-01-29 NOTE — PLAN OF CARE
[facility]   Physical Therapy Initial Evaluation      Name: [facility] Number: [id number]    Therapy Diagnosis:   Encounter Diagnoses   Name Primary?    Decreased ROM of neck Yes    Decreased strength of upper extremity     Decreased functional activity tolerance         Physician: Oscar Teixeira PA-C    Physician Orders: PT Eval and Treat  Medical Diagnosis from Referral: M50.30 (ICD-10-CM) - DDD (degenerative disc disease), cervical  M54.12 (ICD-10-CM) - Cervical radiculopathy  M47.12 (ICD-10-CM) - Osteoarthritis of cervical spine with myelopathy  Evaluation Date: 1/29/2024  Authorization Period Expiration: 1/2/25  Plan of Care Expiration: 3/29/24  Progress Note Due: 3/1/24  Date of Surgery: None  Visit # / Visits authorized: 1 / 1 Eval   FOTO: 1 / 3    Precautions: Prediabetes, Hx of Knee Surgery     Time In: 10:55 AM  Time Out: 11:40 AM  Total Billable Time: 45 minutes    Subjective     Date of onset: 6-8 months ago    History of current condition - Madan reports: that she has been having headaches and stiffness in her neck. Also having a radiating pain down into the right neck and shoulder region. This has been going on for the last 6-8 months. She is also having numbness and tingling in the left elbow running down into the left hand. The left hand is weak at times and that is her dominant hand. She is taking a muscle relaxer as needed. She has also been alternating back and forth between both ice and heat. She is able to perform activities without difficulty but does have pain with some movements. She has been getting headaches almost every day as well. No other medical conditions to be concerned with at this time.    Falls: None     Imaging: Xray of Cervical Spine:  Impression:  Cervical vertebral body heights and alignment appear maintained. There are mild discogenic degenerative changes with mild facet and uncovertebral DJD. There is moderate left from  narrowing at C6-7. There is mild right foraminal narrowing at C6-7. 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    Electronically signed by Raman Pizano MD on 5/30/2023 10:26 AM    Prior Therapy: Yes  Social History: lives with their daughter and grandchildren  Occupation:   Prior Level of Function: independent with ADL's  Current Level of Function: independent with ADL's but painful and stiffness with turning head    Pain:  Current 6/10, worst 10/10, best 3/10   Location: bilateral head and neck   Description: Aching, Dull, Tight, Tingling, Deep, and Numb  Aggravating Factors: Sitting and Night Time and Standing  Easing Factors: ice, hot bath, and muscle relaxer    Patients goals: improve mobility and decrease pain     Medical History:   Past Medical History:   Diagnosis Date    GERD (gastroesophageal reflux disease)     Prediabetes      Surgical History:   Madan More  has a past surgical history that includes Hysterectomy; Knee surgery; Esophagogastroduodenoscopy (N/A, 11/19/2018); and Oophorectomy.    Medications:   Madan currently has no medications in their medication list.    Allergies:   Review of patient's allergies indicates:   Allergen Reactions    Penicillins Hives and Swelling        Objective      Posture: FAIR - slightly rounded shoulders with forward head - no obvious signs of distress. Very pleasant AAF.     Gait: Normal zofia and step length observed. No AD used. Normal arm swing present B. Non-antalgic gait in nature    Cervical Range of Motion:    Degrees Observation Pain   Flexion 55 - - stiffness only     Extension 45 - +     Right Rotation 50% limited - + on L     Left Rotation 25% limited - - stiffness only     Right Sidebend 40 - - L UT stretch     Left Sidebend 35 - + on L          Thoracic Range of Motion: decreased along the upper thoracic spine when moving into extension    Shoulder Active Range of Motion:   Shoulder Right Left   Flexion   180 180    Abduction   170 (stiff in shoulders) 170 (stiff in shoulders)   ER at 90   90 90   IR at 90 70 70     Strength:   Right Left   Flexion  5/5 5/5   Abduction 4+/5 4+/5   Scaption 4+/5 4+/5   Shoulder ER at side 4+/5 4+/5   Shoulder ER at 90-90 4+/5 4+/5   Shoulder IR at side  4+/5 4+/5   Shoulder IR at 90-90 4+/5 4+/5   Middle trap 4/5 4/5   Lower trap 4/5 4/5       Special Tests:    Ligamentous Stability    Sharp-Pavan -     Distraction -   Compression -   Spurlings - B     Cervical Joint Mobility: decreased slightly along the transverse plane from C3-C7      Palpation: mild to moderate TTP along the R UT and LS region as well as along the cervical paraspinals B and the suboccipitals B     Intake Outcome Measure for FOTO Neck Survey    Therapist reviewed FOTO scores for Madan More on 1/29/2024.   FOTO report - see Media section or FOTO account episode details.    Intake Score: 24%     Treatment     Total Treatment time (time-based codes) separate from Evaluation: 23 minutes     Madan received the treatments listed below:      Therapeutic Exercises to develop strength, endurance, ROM, and flexibility for 23 minutes including:    Seated UT Stretch 3x10s B  Seated LS Stretch 3x10s B  Seated Chin Tucks x5 reps (3s holds)  Seated Scap Retractions x5 reps (3s holds)  Ulnar Nerve Glide x10 reps (L only)      Patient Education and Home Exercises     Education provided:   - Home Exercise Program Administration and Review  - Post Exercise Soreness  - Maintaining a pain free range of motion with all activities  - Anatomy/Physiology of the Cervical Spine and the surrounding musculature    Written Home Exercises Provided: yes. Exercises were reviewed and Madan was able to demonstrate them prior to the end of the session.  Madan demonstrated good  understanding of the education provided. See EMR under Patient Instructions for exercises provided during therapy sessions.    Assessment     Madan is a 53 y.o.  female referred to outpatient Physical Therapy with a medical diagnosis of DDD (degenerative disc disease), cervical, Cervical radiculopathy, and Osteoarthritis of cervical spine with myelopathy. Patient presents with decreased cervical range of motion, poor posture, decreased UE strength, and decreased functional mobility overall. Treatment will focus on improving flexibility, posture, UE strength, and cervical joint mobility.    Patient prognosis is Good.   Patient will benefit from skilled outpatient Physical Therapy to address the deficits stated above and in the chart below, provide patient /family education, and to maximize patientt's level of independence.     Plan of care discussed with patient: Yes  Patient's spiritual, cultural and educational needs considered and patient is agreeable to the plan of care and goals as stated below:     Anticipated Barriers for therapy: None Stated    Medical Necessity is demonstrated by the following  History  Co-morbidities and personal factors that may impact the plan of care [] LOW: no personal factors / co-morbidities  [x] MODERATE: 1-2 personal factors / co-morbidities  [] HIGH: 3+ personal factors / co-morbidities    Moderate / High Support Documentation:   Co-morbidities affecting plan of care: Prediabetes, Hx of Knee Surgery    Personal Factors:   no deficits     Examination  Body Structures and Functions, activity limitations and participation restrictions that may impact the plan of care [x] LOW: addressing 1-2 elements  [] MODERATE: 3+ elements  [] HIGH: 4+ elements (please support below)    Moderate / High Support Documentation: N/A     Clinical Presentation [x] LOW: stable  [] MODERATE: Evolving  [] HIGH: Unstable     Decision Making/ Complexity Score: low       Goals:  Short Term Goals: 4 weeks   - Patient will be able sit and stand for at least 30 minutes with minimal to no increase in symptoms for improved functional mobility overall.  - Patient will  demonstrate improved UE strength, especially into shoulder ER by at least 1/2 grade via MMT for increased stability and support with functional tasks.  - Patient will demonstrate improved cervical range of motion, especially into side bending by at least 5 degrees for improved performance of household and community based activities.    Long Term Goals: 8 weeks   - Patient will be able sit and stand for at least 1 hour with minimal to no increase in symptoms for improved functional mobility overall.  - Patient will demonstrate improved UE strength, especially into shoulder scaption by at least 1/2 grade via MMT for increased stability and support with functional tasks.  - Patient will demonstrate improved cervical range of motion, especially into cervical extension by at least 5 degrees for improved performance of household and community based activities.  - Patient will demonstrate independence with Home Exercise Program for continued improvements outside the clinical setting.  - Patient will demonstrate improved FOTO score that is greater than or equal to the predicted value for increased ability to perform ADL's.    Plan     Plan of care Certification: 1/29/2024 to 3/29/24.    Outpatient Physical Therapy 2 times weekly for 8 weeks to include the following interventions: Aquatic Therapy, Cervical/Lumbar Traction, Electrical Stimulation IFC/TENS/PREMOD, Gait Training, Manual Therapy, Moist Heat/ Ice, Neuromuscular Re-ed, Patient Education, Self Care, Therapeutic Activities, Therapeutic Exercise, Ultrasound, and Dry Needling (by a certified therapist).     This patient CAN be treated by a PTA.    Jenelle Sim, PT, DPT,Cert. DN    Physician's Signature: _________________________________________ Date: ________________

## 2024-02-05 NOTE — PROGRESS NOTES
[facility]   Physical Therapy Treatment Note      Name: Madan [facility] Number: [id number]    Therapy Diagnosis:   Encounter Diagnoses   Name Primary?    Decreased ROM of neck Yes    Decreased strength of upper extremity     Decreased functional activity tolerance      Physician: Oscar Teixeira PA-C    Visit Date: 2/6/2024    Physician Orders: PT Eval and Treat  Medical Diagnosis from Referral: M50.30 (ICD-10-CM) - DDD (degenerative disc disease), cervical  M54.12 (ICD-10-CM) - Cervical radiculopathy  M47.12 (ICD-10-CM) - Osteoarthritis of cervical spine with myelopathy  Evaluation Date: 1/29/2024  Authorization Period Expiration: 4/5/24  Plan of Care Expiration: 3/29/24  Progress Note Due: 3/1/24  Date of Surgery: None  Visit # / Visits authorized: 1 / 12 (1 / 1 Eval)   FOTO: 1 / 3     Precautions: Prediabetes, Hx of Knee Surgery      Time In: 9:00 AM  Time Out: 9:53 AM  Total Billable Time: 53 minutes    PTA Visit #: 0/5     Subjective     Patient reports: that the home exercises felt good to her. Having some discomfort along the right neck this morning.    She was compliant with home exercise program.  Response to previous treatment: no adverse reactions  Functional change: in progress - first follow up appointment    Pain: 5/10  Location: right neck      Objective      Objective Measures updated at progress report unless specified.     Treatment     Madan received the treatments listed below:      Therapeutic Exercises to develop strength, endurance, ROM, and flexibility for 53 minutes including:     Jorgito's x2 min (flexion and scaption)  Seated UT Stretch 10x10s B  Seated LS Stretch 10x10s B  Seated Chin Tucks 2x10 reps (3s holds)  Seated Scap Retractions 2x10 reps (3s holds)  Ulnar Nerve Glide x10 reps (L only)  Standing Shoulder Extensions x20 reps + #1 Dowel  Standing Shoulder Rows x20 reps + GTB  Standing Shoulder Extensions x20 reps + GTB  Supine Serratus Punches  3x10 reps B  Supine Shoulder ABC's x1 trial each arm  Supine Shoulder Horizontal Abduction 2x10 reps + YTB  Side-Lying Open Books x10 reps B  Side-Lying Scap Retractions 3x10 reps B    Patient received a hot pack for 8 minutes to cervical musculature post treatment session. No adverse reactions noted. Patient supine for comfort.      Patient Education and Home Exercises       Education provided:   - Home Exercise Program Review  - Post Exercise Soreness  - Maintaining a pain free range of motion with all activities  - Anatomy/Physiology of the Cervical Spine and the surrounding musculature    Written Home Exercises Provided: Patient instructed to cont prior HEP. Exercises were reviewed and Madan was able to demonstrate them prior to the end of the session.  Madan demonstrated good  understanding of the education provided. See Electronic Medical Record under Patient Instructions for exercises provided during therapy sessions    Assessment     Patient tolerated therapy well overall today. Emphasis on UE and cervical range of motion as well as periscapular stability and strengthening. Encouraged a pain free range of motion with all activities. Will continue to progress patient as able next session.    Madan Is progressing well towards her goals.   Patient prognosis is Good.     Patient will continue to benefit from skilled outpatient physical therapy to address the deficits listed in the problem list box on initial evaluation, provide pt/family education and to maximize pt's level of independence in the home and community environment.     Patient's spiritual, cultural and educational needs considered and pt agreeable to plan of care and goals.     Anticipated barriers to physical therapy: None Stated    Goals:   Short Term Goals: 4 weeks (progressing, not met)  - Patient will be able sit and stand for at least 30 minutes with minimal to no increase in symptoms for improved functional mobility overall.  -  Patient will demonstrate improved UE strength, especially into shoulder ER by at least 1/2 grade via MMT for increased stability and support with functional tasks.  - Patient will demonstrate improved cervical range of motion, especially into side bending by at least 5 degrees for improved performance of household and community based activities.     Long Term Goals: 8 weeks (progressing, not met)  - Patient will be able sit and stand for at least 1 hour with minimal to no increase in symptoms for improved functional mobility overall.  - Patient will demonstrate improved UE strength, especially into shoulder scaption by at least 1/2 grade via MMT for increased stability and support with functional tasks.  - Patient will demonstrate improved cervical range of motion, especially into cervical extension by at least 5 degrees for improved performance of household and community based activities.  - Patient will demonstrate independence with Home Exercise Program for continued improvements outside the clinical setting.  - Patient will demonstrate improved FOTO score that is greater than or equal to the predicted value for increased ability to perform ADL's.       Plan     Continue with established POC for improved functional mobility overall.    *A portion of this treatment session is provided with the assistance of a skilled rehbailitation technician under the supervision of a licensed physical therapist.    Jenelle Sim, PT, DPT, Cert. DN

## 2024-02-06 ENCOUNTER — CLINICAL SUPPORT (OUTPATIENT)
Dept: REHABILITATION | Facility: HOSPITAL | Age: 54
End: 2024-02-06
Payer: MEDICAID

## 2024-02-06 DIAGNOSIS — R68.89 DECREASED FUNCTIONAL ACTIVITY TOLERANCE: ICD-10-CM

## 2024-02-06 DIAGNOSIS — R29.898 DECREASED ROM OF NECK: Primary | ICD-10-CM

## 2024-02-06 DIAGNOSIS — R29.898 DECREASED STRENGTH OF UPPER EXTREMITY: ICD-10-CM

## 2024-02-06 PROCEDURE — 97110 THERAPEUTIC EXERCISES: CPT | Mod: PN

## 2024-02-08 NOTE — PROGRESS NOTES
[facility]   Physical Therapy Treatment Note      Name: Madan [facility] Number: [id number]    Therapy Diagnosis:   Encounter Diagnoses   Name Primary?    Decreased ROM of neck Yes    Decreased strength of upper extremity     Decreased functional activity tolerance      Physician: Oscar Teixeira PA-C    Visit Date: 2/9/2024    Physician Orders: PT Eval and Treat  Medical Diagnosis from Referral: M50.30 (ICD-10-CM) - DDD (degenerative disc disease), cervical  M54.12 (ICD-10-CM) - Cervical radiculopathy  M47.12 (ICD-10-CM) - Osteoarthritis of cervical spine with myelopathy  Evaluation Date: 1/29/2024  Authorization Period Expiration: 4/5/24  Plan of Care Expiration: 3/29/24  Progress Note Due: 3/1/24  Date of Surgery: None  Visit # / Visits authorized: 2 / 12 (1 / 1 Eval)   FOTO: 1 / 3     Precautions: Prediabetes, Hx of Knee Surgery      Time In: 8:55 AM  Time Out: 9:48 AM  Total Billable Time: 53 minutes    PTA Visit #: 0/5     Subjective     Patient reports: that she had about 2 days worth of pain relief after therapy. Having more discomfort this morning though. No numbness or tingling this morning.    She was compliant with home exercise program.  Response to previous treatment: no adverse reactions - felt better  Functional change: in progress    Pain: 7/10 (4/10 post treatment session)  Location: right neck      Objective      Objective Measures updated at progress report unless specified.     Treatment     Madan received the treatments listed below:      Therapeutic Exercises to develop strength, endurance, ROM, and flexibility for 53 minutes including:     UBE x2 min each (forward/backward) (level 1)  Standing Wall Slides x10 reps  Pulley's x2 min (flexion and scaption)  Seated UT Stretch 10x10s B  Seated LS Stretch 10x10s B  Seated Chin Tucks 3x10 reps (3s holds)  Seated Scap Retractions 2x10 reps (3s holds)  Ulnar Nerve Glide x10 reps (L only)  Standing Shoulder  Extensions x20 reps + #1 Dowel  Standing Shoulder Rows x20 reps + GTB  Standing Shoulder Extensions x20 reps + GTB  Supine Serratus Punches 3x10 reps B  Supine Shoulder ABC's x1 trial each arm  Supine Shoulder Horizontal Abduction 2x10 reps + YTB  Side-Lying Open Books x10 reps B  Side-Lying Scap Retractions 3x10 reps B    Patient received a hot pack for 8 minutes to cervical musculature post treatment session. No adverse reactions noted. Patient supine for comfort.      Patient Education and Home Exercises       Education provided:   - Home Exercise Program Review  - Post Exercise Soreness  - Maintaining a pain free range of motion with all activities  - Anatomy/Physiology of the Cervical Spine and the surrounding musculature    Written Home Exercises Provided: Patient instructed to cont prior HEP. Exercises were reviewed and Madan was able to demonstrate them prior to the end of the session.  Madan demonstrated good  understanding of the education provided. See Electronic Medical Record under Patient Instructions for exercises provided during therapy sessions    Assessment     Verbal cues for proper posture and breathing through exercises. Flexibility was emphasized as well as upper trap compensation. Home Exercise Program was progressed and administered. Patient reported decreased pain/tension post treatment session.    Madan Is progressing well towards her goals.   Patient prognosis is Good.     Patient will continue to benefit from skilled outpatient physical therapy to address the deficits listed in the problem list box on initial evaluation, provide pt/family education and to maximize pt's level of independence in the home and community environment.     Patient's spiritual, cultural and educational needs considered and pt agreeable to plan of care and goals.     Anticipated barriers to physical therapy: None Stated    Goals:   Short Term Goals: 4 weeks (progressing, not met)  - Patient will be  able sit and stand for at least 30 minutes with minimal to no increase in symptoms for improved functional mobility overall.  - Patient will demonstrate improved UE strength, especially into shoulder ER by at least 1/2 grade via MMT for increased stability and support with functional tasks.  - Patient will demonstrate improved cervical range of motion, especially into side bending by at least 5 degrees for improved performance of household and community based activities.     Long Term Goals: 8 weeks (progressing, not met)  - Patient will be able sit and stand for at least 1 hour with minimal to no increase in symptoms for improved functional mobility overall.  - Patient will demonstrate improved UE strength, especially into shoulder scaption by at least 1/2 grade via MMT for increased stability and support with functional tasks.  - Patient will demonstrate improved cervical range of motion, especially into cervical extension by at least 5 degrees for improved performance of household and community based activities.  - Patient will demonstrate independence with Home Exercise Program for continued improvements outside the clinical setting.  - Patient will demonstrate improved FOTO score that is greater than or equal to the predicted value for increased ability to perform ADL's.       Plan     Continue with established POC for improved functional mobility overall.    *A portion of this treatment session is provided with the assistance of a skilled rehbailitation technician under the supervision of a licensed physical therapist.    Jenelle Sim, PT, DPT, Cert. DN

## 2024-02-09 ENCOUNTER — CLINICAL SUPPORT (OUTPATIENT)
Dept: REHABILITATION | Facility: HOSPITAL | Age: 54
End: 2024-02-09
Payer: MEDICAID

## 2024-02-09 DIAGNOSIS — R29.898 DECREASED ROM OF NECK: Primary | ICD-10-CM

## 2024-02-09 DIAGNOSIS — R29.898 DECREASED STRENGTH OF UPPER EXTREMITY: ICD-10-CM

## 2024-02-09 DIAGNOSIS — R68.89 DECREASED FUNCTIONAL ACTIVITY TOLERANCE: ICD-10-CM

## 2024-02-09 PROCEDURE — 97110 THERAPEUTIC EXERCISES: CPT | Mod: PN

## 2024-02-12 NOTE — PROGRESS NOTES
[facility]   Physical Therapy Treatment Note      Name: Madan [facility] Number: [id number]    Therapy Diagnosis:   Encounter Diagnoses   Name Primary?    Decreased ROM of neck Yes    Decreased strength of upper extremity     Decreased functional activity tolerance      Physician: Oscar Teixeira PA-C    Visit Date: 2/13/2024    Physician Orders: PT Eval and Treat  Medical Diagnosis from Referral: M50.30 (ICD-10-CM) - DDD (degenerative disc disease), cervical  M54.12 (ICD-10-CM) - Cervical radiculopathy  M47.12 (ICD-10-CM) - Osteoarthritis of cervical spine with myelopathy  Evaluation Date: 1/29/2024  Authorization Period Expiration: 4/5/24  Plan of Care Expiration: 3/29/24  Progress Note Due: 3/1/24  Date of Surgery: None  Visit # / Visits authorized: 3 / 12 (1 / 1 Eval)   FOTO: 2 / 3     Precautions: Prediabetes, Hx of Knee Surgery      Time In: 9:00 AM  Time Out: 9:53 AM  Total Billable Time: 53 minutes    PTA Visit #: 0/5     Subjective     Patient reports: that she feeling good this morning. Not having any pain in the neck today. Exercises at home continue to feel beneficial    She was compliant with home exercise program.  Response to previous treatment: no adverse reactions - felt better  Functional change: in progress    Pain: 0/10   Location: right neck      Objective      Objective Measures updated at progress report unless specified.     FOTO: 16    Treatment     Madan received the treatments listed below:      Therapeutic Exercises to develop strength, endurance, ROM, and flexibility for 53 minutes including:     UBE x2 min each (forward/backward) (level 1)  Standing Wall Slides x10 reps  Pulley's x2 min (flexion and scaption)  Seated UT Stretch 10x10s B  Seated LS Stretch 10x10s B  Seated Chin Tucks 3x10 reps (3s holds)  Seated Scap Retractions 2x10 reps (3s holds)  Ulnar Nerve Glide x10 reps (L only)  Standing Shoulder Extensions x20 reps + #3 Dowel  Standing  Shoulder Rows x30 reps + GTB  Standing Shoulder Extensions x30 reps + GTB  Supine Unilateral Serratus Punches 3x10 reps B + #2 DB  Supine Shoulder ABC's x1 trial each arm + #2 DB  Standing Shoulder Horizontal Abduction 2x10 reps + YTB  Side-Lying Open Books x10 reps B  Side-Lying Scap Retractions 3x10 reps B    Patient received a hot pack for 8 minutes to cervical musculature post treatment session. No adverse reactions noted. Patient supine for comfort.      Patient Education and Home Exercises       Education provided:   - Home Exercise Program Review  - Post Exercise Soreness  - Maintaining a pain free range of motion with all activities  - Anatomy/Physiology of the Cervical Spine and the surrounding musculature    Written Home Exercises Provided: Patient instructed to cont prior HEP. Exercises were reviewed and Madan was able to demonstrate them prior to the end of the session.  Madan demonstrated good  understanding of the education provided. See Electronic Medical Record under Patient Instructions for exercises provided during therapy sessions    Assessment     Patient with no reports of cervical pain this date. Able to progress well through exercises without complication. Able to add resistance with supine exercises with emphasis on increased periscapular stability and strength.    Madan Is progressing well towards her goals.   Patient prognosis is Good.     Patient will continue to benefit from skilled outpatient physical therapy to address the deficits listed in the problem list box on initial evaluation, provide pt/family education and to maximize pt's level of independence in the home and community environment.     Patient's spiritual, cultural and educational needs considered and pt agreeable to plan of care and goals.     Anticipated barriers to physical therapy: None Stated    Goals:   Short Term Goals: 4 weeks (progressing, not met)  - Patient will be able sit and stand for at least 30  minutes with minimal to no increase in symptoms for improved functional mobility overall.  - Patient will demonstrate improved UE strength, especially into shoulder ER by at least 1/2 grade via MMT for increased stability and support with functional tasks.  - Patient will demonstrate improved cervical range of motion, especially into side bending by at least 5 degrees for improved performance of household and community based activities.     Long Term Goals: 8 weeks (progressing, not met)  - Patient will be able sit and stand for at least 1 hour with minimal to no increase in symptoms for improved functional mobility overall.  - Patient will demonstrate improved UE strength, especially into shoulder scaption by at least 1/2 grade via MMT for increased stability and support with functional tasks.  - Patient will demonstrate improved cervical range of motion, especially into cervical extension by at least 5 degrees for improved performance of household and community based activities.  - Patient will demonstrate independence with Home Exercise Program for continued improvements outside the clinical setting.  - Patient will demonstrate improved FOTO score that is greater than or equal to the predicted value for increased ability to perform ADL's. (MET: 2/13/24)     Plan     Continue with established POC for improved functional mobility overall.    Jenelle Sim, PT, DPT, Cert. DN

## 2024-02-13 ENCOUNTER — CLINICAL SUPPORT (OUTPATIENT)
Dept: REHABILITATION | Facility: HOSPITAL | Age: 54
End: 2024-02-13
Payer: MEDICAID

## 2024-02-13 DIAGNOSIS — R29.898 DECREASED STRENGTH OF UPPER EXTREMITY: ICD-10-CM

## 2024-02-13 DIAGNOSIS — R29.898 DECREASED ROM OF NECK: Primary | ICD-10-CM

## 2024-02-13 DIAGNOSIS — R68.89 DECREASED FUNCTIONAL ACTIVITY TOLERANCE: ICD-10-CM

## 2024-02-13 PROCEDURE — 97110 THERAPEUTIC EXERCISES: CPT | Mod: PN

## 2024-02-15 NOTE — PROGRESS NOTES
[facility]   Physical Therapy Treatment Note      Name: Madan [facility] Number: [id number]    Therapy Diagnosis:   Encounter Diagnoses   Name Primary?    Decreased ROM of neck Yes    Decreased strength of upper extremity     Decreased functional activity tolerance      Physician: Oscar Teixeira PA-C    Visit Date: 2/16/2024    Physician Orders: PT Eval and Treat  Medical Diagnosis from Referral: M50.30 (ICD-10-CM) - DDD (degenerative disc disease), cervical  M54.12 (ICD-10-CM) - Cervical radiculopathy  M47.12 (ICD-10-CM) - Osteoarthritis of cervical spine with myelopathy  Evaluation Date: 1/29/2024  Authorization Period Expiration: 4/5/24  Plan of Care Expiration: 3/29/24  Progress Note Due: 3/1/24  Date of Surgery: None  Visit # / Visits authorized: 4 / 12 (1 / 1 Eval)   FOTO: 2 / 3     Precautions: Prediabetes, Hx of Knee Surgery      Time In: 7:04 AM  Time Out: 7:57 AM  Total Billable Time: 53 minutes    PTA Visit #: 0/5     Subjective     Patient reports: that she is doing okay this morning. Not really having any neck pain. Still gets a little stiff in the evening times but things seem to be getting better overall. Exercises continue to help.    She was compliant with home exercise program.  Response to previous treatment: no adverse reactions - felt better  Functional change: in progress    Pain: 0/10   Location: right neck      Objective      Objective Measures updated at progress report unless specified.     Treatment     Madan received the treatments listed below:      Therapeutic Exercises to develop strength, endurance, ROM, and flexibility for 53 minutes including:     UBE x2 min each (forward/backward) (level 1)  Standing Wall Slides x10 reps  Pulley's x2 min (flexion and scaption)  Seated Chin Tucks 3x10 reps (3s holds)  Ulnar Nerve Glide x10 reps (L only)  Standing Shoulder Extensions x20 reps + #3 Dowel  Standing Shoulder Rows x30 reps + GTB  Standing Shoulder  Extensions x30 reps + GTB  Standing Lateral Scap Taps x10 reps B + YTB  Standing Ball on Wall 2x10 reps each (up/down, side/side, circles, rev circles)  Supine Unilateral Serratus Punches 3x10 reps B + #2 DB  Supine Shoulder ABC's x1 trial each arm + #2 DB  Supine Chest Press 3x10 reps + #2 DB B  Standing Shoulder Horizontal Abduction 2x10 reps + YTB  Side-Lying Open Books x10 reps B  Side-Lying Scap Retractions 3x10 reps B    Patient received a hot pack for 8 minutes to cervical musculature post treatment session. No adverse reactions noted. Patient supine for comfort.      Patient Education and Home Exercises       Education provided:   - Home Exercise Program Review  - Post Exercise Soreness  - Maintaining a pain free range of motion with all activities  - Anatomy/Physiology of the Cervical Spine and the surrounding musculature    Written Home Exercises Provided: Patient instructed to cont prior HEP. Exercises were reviewed and Madan was able to demonstrate them prior to the end of the session.  Madan demonstrated good  understanding of the education provided. See Electronic Medical Record under Patient Instructions for exercises provided during therapy sessions.    Assessment     Emphasis continues to be placed on increased periscapular stability and upper extremity strengthening without compensation from the upper traps or cervical musculature. No exacerbation of symptoms reported during treatment. Will continue to progress patient as able next session.    Madan Is progressing well towards her goals.   Patient prognosis is Good.     Patient will continue to benefit from skilled outpatient physical therapy to address the deficits listed in the problem list box on initial evaluation, provide pt/family education and to maximize pt's level of independence in the home and community environment.     Patient's spiritual, cultural and educational needs considered and pt agreeable to plan of care and  goals.     Anticipated barriers to physical therapy: None Stated    Goals:   Short Term Goals: 4 weeks (progressing, not met)  - Patient will be able sit and stand for at least 30 minutes with minimal to no increase in symptoms for improved functional mobility overall.  - Patient will demonstrate improved UE strength, especially into shoulder ER by at least 1/2 grade via MMT for increased stability and support with functional tasks.  - Patient will demonstrate improved cervical range of motion, especially into side bending by at least 5 degrees for improved performance of household and community based activities.     Long Term Goals: 8 weeks (progressing, not met)  - Patient will be able sit and stand for at least 1 hour with minimal to no increase in symptoms for improved functional mobility overall.  - Patient will demonstrate improved UE strength, especially into shoulder scaption by at least 1/2 grade via MMT for increased stability and support with functional tasks.  - Patient will demonstrate improved cervical range of motion, especially into cervical extension by at least 5 degrees for improved performance of household and community based activities.  - Patient will demonstrate independence with Home Exercise Program for continued improvements outside the clinical setting.  - Patient will demonstrate improved FOTO score that is greater than or equal to the predicted value for increased ability to perform ADL's. (MET: 2/13/24)     Plan     Continue with established POC for improved functional mobility overall.    Jenelle Sim, PT, DPT, Cert. DN

## 2024-02-16 ENCOUNTER — CLINICAL SUPPORT (OUTPATIENT)
Dept: REHABILITATION | Facility: HOSPITAL | Age: 54
End: 2024-02-16
Payer: MEDICAID

## 2024-02-16 DIAGNOSIS — R68.89 DECREASED FUNCTIONAL ACTIVITY TOLERANCE: ICD-10-CM

## 2024-02-16 DIAGNOSIS — R29.898 DECREASED ROM OF NECK: Primary | ICD-10-CM

## 2024-02-16 DIAGNOSIS — R29.898 DECREASED STRENGTH OF UPPER EXTREMITY: ICD-10-CM

## 2024-02-16 PROCEDURE — 97110 THERAPEUTIC EXERCISES: CPT | Mod: PN

## 2024-02-19 NOTE — PROGRESS NOTES
[facility]   Physical Therapy Treatment Note      Name: Madan [facility] Number: [id number]    Therapy Diagnosis:   Encounter Diagnoses   Name Primary?    Decreased ROM of neck Yes    Decreased strength of upper extremity     Decreased functional activity tolerance      Physician: Oscar Teixeira PA-C    Visit Date: 2/20/2024    Physician Orders: PT Eval and Treat  Medical Diagnosis from Referral: M50.30 (ICD-10-CM) - DDD (degenerative disc disease), cervical  M54.12 (ICD-10-CM) - Cervical radiculopathy  M47.12 (ICD-10-CM) - Osteoarthritis of cervical spine with myelopathy  Evaluation Date: 1/29/2024  Authorization Period Expiration: 4/5/24  Plan of Care Expiration: 3/29/24  Progress Note Due: 3/1/24  Date of Surgery: None  Visit # / Visits authorized: 5 / 12 (1 / 1 Eval)   FOTO: 2 / 3     Precautions: Prediabetes, Hx of Knee Surgery      Time In: 9:09 AM (pt arrived late)  Time Out: 10:02 AM  Total Billable Time: 53 minutes    PTA Visit #: 0/5     Subjective     Patient reports: that she had some tingling this morning but was able to perform her exercises at that did help. Still having some discomfort but the tingling is better overall.    She was compliant with home exercise program.  Response to previous treatment: no adverse reactions - felt better  Functional change: in progress    Pain: 4/10   Location: right neck      Objective      Objective Measures updated at progress report unless specified.     Treatment     Madan received the treatments listed below:      Therapeutic Exercises to develop strength, endurance, ROM, and flexibility for 53 minutes including:     UBE x2 min each (forward/backward) (level 1)  Standing Wall Slides 2x10 reps  Seated UT Stretch 10x10s B  Seated LS Stretch 10x10s B  Seated Chin Tucks 3x10 reps (3s holds)  Ulnar Nerve Glide x10 reps (L only)  Standing Shoulder Extensions x20 reps + #3 Dowel  Standing Shoulder Rows x30 reps + #10 CC  Machine  Standing Shoulder Extensions x30 reps + #10 CC Machine  Standing Lateral Scap Taps x15 reps B + YTB  Standing Ball on Wall 2x10 reps each (up/down, side/side, circles, rev circles)  Standing Shoulder Horizontal Abduction 2x10 reps + YTB  Standing Shoulder Scaption + Opposite Arm Iso Hold x10 reps each side + #1 DB  Supine Unilateral Serratus Punches 3x10 reps B + #2 DB  Supine Shoulder ABC's x1 trial each arm + #2 DB  Supine Chest Press 3x10 reps + #2 DB   Side-Lying Open Books x10 reps B + YTB    Patient received IFC electrical stimulation to the cervical spine x8 minutes at 100% scan with intensity increased to patient tolerance. No adverse reactions noted. Patient supine for comfort.    Patient received a hot pack for 8 minutes to cervical musculature post treatment session. No adverse reactions noted. Patient supine for comfort.      Patient Education and Home Exercises       Education provided:   - Home Exercise Program Review  - Post Exercise Soreness  - Maintaining a pain free range of motion with all activities  - Anatomy/Physiology of the Cervical Spine and the surrounding musculature    Written Home Exercises Provided: Patient instructed to cont prior HEP. Exercises were reviewed and Madan was able to demonstrate them prior to the end of the session.  Madan demonstrated good  understanding of the education provided. See Electronic Medical Record under Patient Instructions for exercises provided during therapy sessions.    Assessment     Patient able to progress reps and or intensity of some exercises without complication. Verbal cues for prevention of upper trap compensation with all exercises - patient is improving with this. IFC electrical stimulation was applied post treatment to assist with pain relief this date.    Madan Is progressing well towards her goals.   Patient prognosis is Good.     Patient will continue to benefit from skilled outpatient physical therapy to address the  deficits listed in the problem list box on initial evaluation, provide pt/family education and to maximize pt's level of independence in the home and community environment.     Patient's spiritual, cultural and educational needs considered and pt agreeable to plan of care and goals.     Anticipated barriers to physical therapy: None Stated    Goals:   Short Term Goals: 4 weeks (progressing, not met)  - Patient will be able sit and stand for at least 30 minutes with minimal to no increase in symptoms for improved functional mobility overall.  - Patient will demonstrate improved UE strength, especially into shoulder ER by at least 1/2 grade via MMT for increased stability and support with functional tasks.  - Patient will demonstrate improved cervical range of motion, especially into side bending by at least 5 degrees for improved performance of household and community based activities.     Long Term Goals: 8 weeks (progressing, not met)  - Patient will be able sit and stand for at least 1 hour with minimal to no increase in symptoms for improved functional mobility overall.  - Patient will demonstrate improved UE strength, especially into shoulder scaption by at least 1/2 grade via MMT for increased stability and support with functional tasks.  - Patient will demonstrate improved cervical range of motion, especially into cervical extension by at least 5 degrees for improved performance of household and community based activities.  - Patient will demonstrate independence with Home Exercise Program for continued improvements outside the clinical setting.  - Patient will demonstrate improved FOTO score that is greater than or equal to the predicted value for increased ability to perform ADL's. (MET: 2/13/24)     Plan     Continue with established POC for improved functional mobility overall.    *A portion of this treatment session is provided with the assistance of a skilled rehbailitation technician under the  supervision of a licensed physical therapist.    Jenelle Sim, PT, DPT, Cert. DN

## 2024-02-20 ENCOUNTER — CLINICAL SUPPORT (OUTPATIENT)
Dept: REHABILITATION | Facility: HOSPITAL | Age: 54
End: 2024-02-20
Payer: MEDICAID

## 2024-02-20 DIAGNOSIS — R29.898 DECREASED ROM OF NECK: Primary | ICD-10-CM

## 2024-02-20 DIAGNOSIS — R68.89 DECREASED FUNCTIONAL ACTIVITY TOLERANCE: ICD-10-CM

## 2024-02-20 DIAGNOSIS — R29.898 DECREASED STRENGTH OF UPPER EXTREMITY: ICD-10-CM

## 2024-02-20 PROCEDURE — 97110 THERAPEUTIC EXERCISES: CPT | Mod: PN

## 2024-02-22 NOTE — PROGRESS NOTES
[facility]   Physical Therapy Treatment Note      Name: Madan [facility] Number: [id number]    Therapy Diagnosis:   Encounter Diagnoses   Name Primary?    Decreased ROM of neck Yes    Decreased strength of upper extremity     Decreased functional activity tolerance      Physician: Oscar Teixeira PA-C    Visit Date: 2/23/2024    Physician Orders: PT Eval and Treat  Medical Diagnosis from Referral: M50.30 (ICD-10-CM) - DDD (degenerative disc disease), cervical  M54.12 (ICD-10-CM) - Cervical radiculopathy  M47.12 (ICD-10-CM) - Osteoarthritis of cervical spine with myelopathy  Evaluation Date: 1/29/2024  Authorization Period Expiration: 4/5/24  Plan of Care Expiration: 3/29/24  Progress Note Due: 3/1/24  Date of Surgery: None  Visit # / Visits authorized: 6 / 12 (1 / 1 Eval)   FOTO: 2 / 3     Precautions: Prediabetes, Hx of Knee Surgery      Time In: 9:00 AM  Time Out: 9:53 AM  Total Billable Time: 53 minutes    PTA Visit #: 0/5     Subjective     Patient reports: that she felt good after last session. Not having any pain this morning. She liked the electrical stimulation.    She was compliant with home exercise program.  Response to previous treatment: no adverse reactions - felt better  Functional change: in progress    Pain: 0/10   Location: right neck      Objective      Objective Measures updated at progress report unless specified.     Treatment     Madan received the treatments listed below:      Therapeutic Exercises to develop strength, endurance, ROM, and flexibility for 53 minutes including:     UBE x2 min each (forward/backward) (level 1)  Standing Wall Slides 2x10 reps with cervical extension  Standing Chin Tucks 2x10 reps (3s holds) + pushing head into silver ball   Ulnar Nerve Glide x10 reps (L only)  Standing Shoulder Extensions x20 reps + #3 Dowel  Standing Shoulder Rows x30 reps + #10 CC Machine  Standing Shoulder Extensions x30 reps + #10 CC Machine  Standing  Lateral Scap Taps x15 reps B + YTB  Standing Ball on Wall 2x10 reps each (up/down, side/side, circles, rev circles)  Standing Shoulder Horizontal Abduction 2x10 reps + YTB  Standing Shoulder Scaption + Opposite Arm Iso Hold x15 reps each side + #1 DB  Supine Unilateral Serratus Punches 3x10 reps B + #2 DB  Supine Shoulder ABC's x1 trial each arm + #2 DB  Supine Chest Press 3x10 reps + #2 DB   Supine Shoulder Flexion x2 min + Cervical Extensions + #3 Dowel  Side-Lying Open Books x15 reps B + YTB    Patient received IFC electrical stimulation to the cervical spine x8 minutes at 100% scan with intensity increased to patient tolerance. No adverse reactions noted. Patient supine for comfort.    Patient received a hot pack for 8 minutes to cervical musculature post treatment session. No adverse reactions noted. Patient supine for comfort.      Patient Education and Home Exercises       Education provided:   - Home Exercise Program Review  - Post Exercise Soreness  - Maintaining a pain free range of motion with all activities  - Anatomy/Physiology of the Cervical Spine and the surrounding musculature    Written Home Exercises Provided: Patient instructed to cont prior HEP. Exercises were reviewed and Madan was able to demonstrate them prior to the end of the session.  Madan demonstrated good  understanding of the education provided. See Electronic Medical Record under Patient Instructions for exercises provided during therapy sessions.    Assessment     Patient tolerated progression of each exercise well. Cervical extensions utilized with overhead movements to assist with mobility. No exacerbation of symptoms reported. Able to progress reps and or intensity of some exercises without complication. Encouraged patient to continue with Home Exercise Program especially with any increase or onset of symptoms - patient verbalized understanding.    Madan Is progressing well towards her goals.   Patient prognosis is  Good.     Patient will continue to benefit from skilled outpatient physical therapy to address the deficits listed in the problem list box on initial evaluation, provide pt/family education and to maximize pt's level of independence in the home and community environment.     Patient's spiritual, cultural and educational needs considered and pt agreeable to plan of care and goals.     Anticipated barriers to physical therapy: None Stated    Goals:   Short Term Goals: 4 weeks (progressing, not met)  - Patient will be able sit and stand for at least 30 minutes with minimal to no increase in symptoms for improved functional mobility overall.  - Patient will demonstrate improved UE strength, especially into shoulder ER by at least 1/2 grade via MMT for increased stability and support with functional tasks.  - Patient will demonstrate improved cervical range of motion, especially into side bending by at least 5 degrees for improved performance of household and community based activities.     Long Term Goals: 8 weeks (progressing, not met)  - Patient will be able sit and stand for at least 1 hour with minimal to no increase in symptoms for improved functional mobility overall.  - Patient will demonstrate improved UE strength, especially into shoulder scaption by at least 1/2 grade via MMT for increased stability and support with functional tasks.  - Patient will demonstrate improved cervical range of motion, especially into cervical extension by at least 5 degrees for improved performance of household and community based activities.  - Patient will demonstrate independence with Home Exercise Program for continued improvements outside the clinical setting.  - Patient will demonstrate improved FOTO score that is greater than or equal to the predicted value for increased ability to perform ADL's. (MET: 2/13/24)     Plan     Continue with established POC for improved functional mobility overall.    *A portion of this treatment  session is provided with the assistance of a skilled rehbailitation technician under the supervision of a licensed physical therapist.    Jenelle Sim PT, DPT, Cert. DN

## 2024-02-23 ENCOUNTER — CLINICAL SUPPORT (OUTPATIENT)
Dept: REHABILITATION | Facility: HOSPITAL | Age: 54
End: 2024-02-23
Payer: MEDICAID

## 2024-02-23 DIAGNOSIS — R29.898 DECREASED STRENGTH OF UPPER EXTREMITY: ICD-10-CM

## 2024-02-23 DIAGNOSIS — R68.89 DECREASED FUNCTIONAL ACTIVITY TOLERANCE: ICD-10-CM

## 2024-02-23 DIAGNOSIS — R29.898 DECREASED ROM OF NECK: Primary | ICD-10-CM

## 2024-02-23 PROCEDURE — 97110 THERAPEUTIC EXERCISES: CPT | Mod: PN

## 2024-02-26 NOTE — PROGRESS NOTES
[facility]   Physical Therapy Treatment Note      [facility] [id number]    Therapy Diagnosis:   Encounter Diagnoses   Name Primary?    Decreased ROM of neck Yes    Decreased strength of upper extremity     Decreased functional activity tolerance      Physician: Oscar Teixeira PA-C    Visit Date: 2/27/2024    Physician Orders: PT Eval and Treat  Medical Diagnosis from Referral: M50.30 (ICD-10-CM) - DDD (degenerative disc disease), cervical  M54.12 (ICD-10-CM) - Cervical radiculopathy  M47.12 (ICD-10-CM) - Osteoarthritis of cervical spine with myelopathy  Evaluation Date: 1/29/2024  Authorization Period Expiration: 4/5/24  Plan of Care Expiration: 3/29/24  Progress Note Due: 3/1/24  Date of Surgery: None  Visit # / Visits authorized: 7 / 12 (1 / 1 Eval)   FOTO: 2 / 3     Precautions: Prediabetes, Hx of Knee Surgery      Time In: 8:55 AM  Time Out: 9:48 AM  Total Billable Time: 53 minutes    PTA Visit #: 0/5     Subjective     Patient reports: that she is feeling good overall this morning. No problems after last session.    She was compliant with home exercise program.  Response to previous treatment: no adverse reactions - felt better  Functional change: in progress    Pain: 0/10   Location: right neck      Objective      Objective Measures updated at progress report unless specified.     Treatment     Madan received the treatments listed below:      Therapeutic Exercises to develop strength, endurance, ROM, and flexibility for 53 minutes including:     UBE x2 min each (forward/backward) (level 1)  Standing Wall Slides 2x10 reps with cervical extension  Standing Chin Tucks 2x10 reps (3s holds) + pushing head into silver ball   Ulnar Nerve Glide x10 reps (L only)  Standing Shoulder Extensions x30 reps + #3 Dowel  Standing Shoulder Rows x30 reps + #10 CC Machine  Standing Shoulder Extensions x30 reps + #13 CC Machine  Standing Lateral Scap Taps x20 reps B +  YTB  Standing Ball on Wall 2x10 reps each (up/down, side/side, circles, rev circles)  Standing Shoulder Horizontal Abduction 2x10 reps + YTB  Standing Shoulder Scaption + Opposite Arm Iso Hold x20 reps each side + #1 DB  Supine Unilateral Serratus Punches x10 reps B + #5 KB upside down  Prone Rows 2x10 reps B  Prone Extensions x10 reps B  Supine Shoulder Flexion x2 min + Cervical Extensions + #3 Dowel  Seated Cervical Towel Pulls into Rotation 10x10s B    Patient received IFC electrical stimulation to the cervical spine x8 minutes at 100% scan with intensity increased to patient tolerance. No adverse reactions noted. Patient supine for comfort.    Patient received a hot pack for 8 minutes to cervical musculature post treatment session. No adverse reactions noted. Patient supine for comfort.      Patient Education and Home Exercises       Education provided:   - Home Exercise Program Review  - Post Exercise Soreness  - Maintaining a pain free range of motion with all activities  - Anatomy/Physiology of the Cervical Spine and the surrounding musculature    Written Home Exercises Provided: Patient instructed to cont prior HEP. Exercises were reviewed and Madan was able to demonstrate them prior to the end of the session.  Madan demonstrated good  understanding of the education provided. See Electronic Medical Record under Patient Instructions for exercises provided during therapy sessions.    Assessment     Patient tolerated therapy well overall today. Able to progress reps and or intensity of some exercises without complication. Verbal cues for maintaining proper posture with all activities. Educated patient on the importance of continuing to perform Home Exercise Program for maintenance outside the clinical setting.     Madan Is progressing well towards her goals.   Patient prognosis is Good.     Patient will continue to benefit from skilled outpatient physical therapy to address the deficits listed in  the problem list box on initial evaluation, provide pt/family education and to maximize pt's level of independence in the home and community environment.     Patient's spiritual, cultural and educational needs considered and pt agreeable to plan of care and goals.     Anticipated barriers to physical therapy: None Stated    Goals:   Short Term Goals: 4 weeks (progressing, not met)  - Patient will be able sit and stand for at least 30 minutes with minimal to no increase in symptoms for improved functional mobility overall.  - Patient will demonstrate improved UE strength, especially into shoulder ER by at least 1/2 grade via MMT for increased stability and support with functional tasks.  - Patient will demonstrate improved cervical range of motion, especially into side bending by at least 5 degrees for improved performance of household and community based activities.     Long Term Goals: 8 weeks (progressing, not met)  - Patient will be able sit and stand for at least 1 hour with minimal to no increase in symptoms for improved functional mobility overall.  - Patient will demonstrate improved UE strength, especially into shoulder scaption by at least 1/2 grade via MMT for increased stability and support with functional tasks.  - Patient will demonstrate improved cervical range of motion, especially into cervical extension by at least 5 degrees for improved performance of household and community based activities.  - Patient will demonstrate independence with Home Exercise Program for continued improvements outside the clinical setting.  - Patient will demonstrate improved FOTO score that is greater than or equal to the predicted value for increased ability to perform ADL's. (MET: 2/13/24)     Plan     Continue with established POC for improved functional mobility overall.    Jenelle Sim, PT, DPT, Cert. DN

## 2024-02-27 ENCOUNTER — CLINICAL SUPPORT (OUTPATIENT)
Dept: REHABILITATION | Facility: HOSPITAL | Age: 54
End: 2024-02-27
Payer: MEDICAID

## 2024-02-27 DIAGNOSIS — R29.898 DECREASED STRENGTH OF UPPER EXTREMITY: ICD-10-CM

## 2024-02-27 DIAGNOSIS — R29.898 DECREASED ROM OF NECK: Primary | ICD-10-CM

## 2024-02-27 DIAGNOSIS — R68.89 DECREASED FUNCTIONAL ACTIVITY TOLERANCE: ICD-10-CM

## 2024-02-27 PROCEDURE — 97110 THERAPEUTIC EXERCISES: CPT | Mod: PN

## 2024-02-28 NOTE — PROGRESS NOTES
[facility]   Physical Therapy Treatment Note, Re-assessment, & Discharge Summary     Name: Madan More  [facility] Number: [id number]    Therapy Diagnosis:   Encounter Diagnoses   Name Primary?    Decreased ROM of neck Yes    Decreased strength of upper extremity     Decreased functional activity tolerance      Physician: Oscar Teixeira PA-C    Visit Date: 3/1/2024    Physician Orders: PT Eval and Treat  Medical Diagnosis from Referral: M50.30 (ICD-10-CM) - DDD (degenerative disc disease), cervical  M54.12 (ICD-10-CM) - Cervical radiculopathy  M47.12 (ICD-10-CM) - Osteoarthritis of cervical spine with myelopathy  Evaluation Date: 1/29/2024  Authorization Period Expiration: 4/5/24  Plan of Care Expiration: 3/29/24  Progress Note Due: 3/1/24  Date of Surgery: None  Visit # / Visits authorized: 8 / 12 (1 / 1 Eval) (re-assessed on 3/1/24)  FOTO: 3 / 3     Precautions: Prediabetes, Hx of Knee Surgery      Time In: 9:00 AM  Time Out: 9:53 AM  Total Billable Time: 53 minutes    PTA Visit #: 0/5     Date of Last visit: 3/1/24  Total Visits Received: 9    Subjective     Patient reports: that she is feeling good this morning, no problems after last session. No pain right now.     She was compliant with home exercise program.  Response to previous treatment: no adverse reactions - felt better  Functional change: in progress    Pain: 0/10   Location: right neck      Objective      Objective Measures updated at progress report unless specified.     Posture: FAIR - slightly rounded shoulders with forward head - no obvious signs of distress. Very pleasant AAF.      Gait: Normal zofia and step length observed. No AD used. Normal arm swing present B. Non-antalgic gait in nature     Cervical Range of Motion:     Degrees Observation Pain   Flexion 55 - -      Extension 50 - -      Right Rotation WFL - -   Left Rotation WFL - -      Right Sidebend 40 - -   Left Sidebend 40 - -         Thoracic Range of  Motion: mildly decreased along the upper thoracic spine when moving into extension     Shoulder Active Range of Motion:   Shoulder Right Left   Flexion    180 180   Abduction    180 180   ER at 90    90 90   IR at 90 70 70      Strength:    Right Left   Flexion  5/5 5/5   Abduction 5/5 5/5   Scaption 5/5 5/5   Shoulder ER at side 5/5 5/5   Shoulder ER at 90-90 4+/5 4+/5   Shoulder IR at side  4+/5 4+/5   Shoulder IR at 90-90 4+/5 4+/5   Middle trap 4+/5 4+/5   Lower trap 4+/5 4+/5         Special Tests:     Ligamentous Stability     Sharp-Pavan -      Distraction -   Compression -   Spurlings - B      Cervical Joint Mobility: WFL along the cervical spine across the transverse plane     Palpation: mild TTP along the R UT and LS region as well as along the cervical paraspinals B and the suboccipitals B      Intake Outcome Measure for FOTO Neck Survey     Therapist reviewed FOTO scores for Madan More on 3/1/2024.   FOTO report - see Media section or FOTO account episode details.     Intake Score: 0      Treatment     Madan received the treatments listed below:      Therapeutic Exercises to develop strength, endurance, ROM, and flexibility for 53 minutes including:     Re-assessment  UBE x2 min each (forward/backward) (level 1)  Standing Wall Slides 2x10 reps with cervical extension  Standing Chin Tucks 2x10 reps (3s holds) + pushing head into silver ball   Ulnar Nerve Glide x10 reps (L only) - Home Exercise Program   Standing Shoulder Extensions x30 reps + #3 Dowel  Standing Shoulder Rows x30 reps + #13 CC Machine  Standing Shoulder Extensions x30 reps + #13 CC Machine  Standing Lateral Scap Taps x10 reps B + RTB  Standing Ball on Wall 2x10 reps each (up/down, side/side, circles, rev circles) B  Supine Shoulder Horizontal Abduction 2x10 reps + RTB  Standing Shoulder Scaption + Opposite Arm Iso Hold x20 reps each side + #2 DB  Supine Unilateral Serratus Punches x10 reps B + #5 KB upside down  Prone Rows 2x10  reps B  Prone Extensions x10 reps B  Supine Shoulder Flexion x2 min + Cervical Extensions + #3 Dowel    Patient received IFC electrical stimulation to the cervical spine x8 minutes at 100% scan with intensity increased to patient tolerance. No adverse reactions noted. Patient supine for comfort.    Patient received a hot pack for 8 minutes to cervical musculature post treatment session. No adverse reactions noted. Patient supine for comfort.      Patient Education and Home Exercises       Education provided:   - Home Exercise Program Review  - Post Exercise Soreness  - Maintaining a pain free range of motion with all activities  - Anatomy/Physiology of the Cervical Spine and the surrounding musculature    Written Home Exercises Provided: Patient instructed to cont prior HEP. Exercises were reviewed and Robterrytegan was able to demonstrate them prior to the end of the session.  Madan demonstrated good  understanding of the education provided. See Electronic Medical Record under Patient Instructions for exercises provided during therapy sessions.    Assessment     Patient has made progress since beginning therapy. Noted pain to have decreased significantly and patient is independent with Home Exercise Program at this time. UE strength and cervical range of motion is now functional and pain free upon re-assessment. Educated patient on the importance of being proactive with exercises - patient verbalized understanding. At this point the patient is now discharged from skilled outpatient PT services and has met all goals.    Discharge reason: Patient is now asymptomatic, Patient has met all of his/her goals, and Patient has reached the maximum rehab potential for the present time    Discharge FOTO Score: 0    Madan Is progressing well towards her goals.   Patient prognosis is Good.     Patient will continue to benefit from skilled outpatient physical therapy to address the deficits listed in the problem list box on  initial evaluation, provide pt/family education and to maximize pt's level of independence in the home and community environment.     Patient's spiritual, cultural and educational needs considered and pt agreeable to plan of care and goals.     Anticipated barriers to physical therapy: None Stated    Goals:   Short Term Goals: 4 weeks   - Patient will be able sit and stand for at least 30 minutes with minimal to no increase in symptoms for improved functional mobility overall. (MET: 2/13/24)  - Patient will demonstrate improved UE strength, especially into shoulder ER by at least 1/2 grade via MMT for increased stability and support with functional tasks.(MET: 2/13/24)  - Patient will demonstrate improved cervical range of motion, especially into side bending by at least 5 degrees for improved performance of household and community based activities. (MET: 2/13/24)     Long Term Goals: 8 weeks  - Patient will be able sit and stand for at least 1 hour with minimal to no increase in symptoms for improved functional mobility overall. (MET: 2/13/24)  - Patient will demonstrate improved UE strength, especially into shoulder scaption by at least 1/2 grade via MMT for increased stability and support with functional tasks. (MET: 2/13/24)  - Patient will demonstrate improved cervical range of motion, especially into cervical extension by at least 5 degrees for improved performance of household and community based activities. (MET: 2/13/24)  - Patient will demonstrate independence with Home Exercise Program for continued improvements outside the clinical setting. (MET: 2/13/24)  - Patient will demonstrate improved FOTO score that is greater than or equal to the predicted value for increased ability to perform ADL's. (MET: 2/13/24)     Plan     This patient is discharged from skilled outpatient Physical Therapy services.    Jenelle Sim, PT, DPT, Cert. DN

## 2024-03-01 ENCOUNTER — CLINICAL SUPPORT (OUTPATIENT)
Dept: REHABILITATION | Facility: HOSPITAL | Age: 54
End: 2024-03-01
Payer: MEDICAID

## 2024-03-01 DIAGNOSIS — R29.898 DECREASED ROM OF NECK: Primary | ICD-10-CM

## 2024-03-01 DIAGNOSIS — R29.898 DECREASED STRENGTH OF UPPER EXTREMITY: ICD-10-CM

## 2024-03-01 DIAGNOSIS — R68.89 DECREASED FUNCTIONAL ACTIVITY TOLERANCE: ICD-10-CM

## 2024-03-01 PROCEDURE — 97110 THERAPEUTIC EXERCISES: CPT | Mod: PN

## 2024-03-01 NOTE — PLAN OF CARE
[facility]   Physical Therapy Treatment Note, Re-assessment, & Discharge Summary     Name: Madan More  [facility] Number: [id number]    Therapy Diagnosis:   Encounter Diagnoses   Name Primary?    Decreased ROM of neck Yes    Decreased strength of upper extremity     Decreased functional activity tolerance      Physician: Oscar Teixeira PA-C    Visit Date: 3/1/2024    Physician Orders: PT Eval and Treat  Medical Diagnosis from Referral: M50.30 (ICD-10-CM) - DDD (degenerative disc disease), cervical  M54.12 (ICD-10-CM) - Cervical radiculopathy  M47.12 (ICD-10-CM) - Osteoarthritis of cervical spine with myelopathy  Evaluation Date: 1/29/2024  Authorization Period Expiration: 4/5/24  Plan of Care Expiration: 3/29/24  Progress Note Due: 3/1/24  Date of Surgery: None  Visit # / Visits authorized: 8 / 12 (1 / 1 Eval) (re-assessed on 3/1/24)  FOTO: 3 / 3     Precautions: Prediabetes, Hx of Knee Surgery      Time In: 9:00 AM  Time Out: 9:53 AM  Total Billable Time: 53 minutes    PTA Visit #: 0/5     Date of Last visit: 3/1/24  Total Visits Received: 9    Subjective     Patient reports: that she is feeling good this morning, no problems after last session. No pain right now.     She was compliant with home exercise program.  Response to previous treatment: no adverse reactions - felt better  Functional change: in progress    Pain: 0/10   Location: right neck      Objective      Objective Measures updated at progress report unless specified.     Posture: FAIR - slightly rounded shoulders with forward head - no obvious signs of distress. Very pleasant AAF.      Gait: Normal zofia and step length observed. No AD used. Normal arm swing present B. Non-antalgic gait in nature     Cervical Range of Motion:     Degrees Observation Pain   Flexion 55 - -      Extension 50 - -      Right Rotation WFL - -   Left Rotation WFL - -      Right Sidebend 40 - -   Left Sidebend 40 - -         Thoracic Range of  Motion: mildly decreased along the upper thoracic spine when moving into extension     Shoulder Active Range of Motion:   Shoulder Right Left   Flexion    180 180   Abduction    180 180   ER at 90    90 90   IR at 90 70 70      Strength:    Right Left   Flexion  5/5 5/5   Abduction 5/5 5/5   Scaption 5/5 5/5   Shoulder ER at side 5/5 5/5   Shoulder ER at 90-90 4+/5 4+/5   Shoulder IR at side  4+/5 4+/5   Shoulder IR at 90-90 4+/5 4+/5   Middle trap 4+/5 4+/5   Lower trap 4+/5 4+/5         Special Tests:     Ligamentous Stability     Sharp-Pavan -      Distraction -   Compression -   Spurlings - B      Cervical Joint Mobility: WFL along the cervical spine across the transverse plane     Palpation: mild TTP along the R UT and LS region as well as along the cervical paraspinals B and the suboccipitals B      Intake Outcome Measure for FOTO Neck Survey     Therapist reviewed FOTO scores for Madan More on 3/1/2024.   FOTO report - see Media section or FOTO account episode details.     Intake Score: 0      Treatment     Madan received the treatments listed below:      Therapeutic Exercises to develop strength, endurance, ROM, and flexibility for 53 minutes including:     Re-assessment  UBE x2 min each (forward/backward) (level 1)  Standing Wall Slides 2x10 reps with cervical extension  Standing Chin Tucks 2x10 reps (3s holds) + pushing head into silver ball   Ulnar Nerve Glide x10 reps (L only) - Home Exercise Program   Standing Shoulder Extensions x30 reps + #3 Dowel  Standing Shoulder Rows x30 reps + #13 CC Machine  Standing Shoulder Extensions x30 reps + #13 CC Machine  Standing Lateral Scap Taps x10 reps B + RTB  Standing Ball on Wall 2x10 reps each (up/down, side/side, circles, rev circles) B  Supine Shoulder Horizontal Abduction 2x10 reps + RTB  Standing Shoulder Scaption + Opposite Arm Iso Hold x20 reps each side + #2 DB  Supine Unilateral Serratus Punches x10 reps B + #5 KB upside down  Prone Rows 2x10  reps B  Prone Extensions x10 reps B  Supine Shoulder Flexion x2 min + Cervical Extensions + #3 Dowel    Patient received IFC electrical stimulation to the cervical spine x8 minutes at 100% scan with intensity increased to patient tolerance. No adverse reactions noted. Patient supine for comfort.    Patient received a hot pack for 8 minutes to cervical musculature post treatment session. No adverse reactions noted. Patient supine for comfort.      Patient Education and Home Exercises       Education provided:   - Home Exercise Program Review  - Post Exercise Soreness  - Maintaining a pain free range of motion with all activities  - Anatomy/Physiology of the Cervical Spine and the surrounding musculature    Written Home Exercises Provided: Patient instructed to cont prior HEP. Exercises were reviewed and Robterrytegan was able to demonstrate them prior to the end of the session.  Madan demonstrated good  understanding of the education provided. See Electronic Medical Record under Patient Instructions for exercises provided during therapy sessions.    Assessment     Patient has made progress since beginning therapy. Noted pain to have decreased significantly and patient is independent with Home Exercise Program at this time. UE strength and cervical range of motion is now functional and pain free upon re-assessment. Educated patient on the importance of being proactive with exercises - patient verbalized understanding. At this point the patient is now discharged from skilled outpatient PT services and has met all goals.    Discharge reason: Patient is now asymptomatic, Patient has met all of his/her goals, and Patient has reached the maximum rehab potential for the present time    Discharge FOTO Score: 0    Madan Is progressing well towards her goals.   Patient prognosis is Good.     Patient will continue to benefit from skilled outpatient physical therapy to address the deficits listed in the problem list box on  initial evaluation, provide pt/family education and to maximize pt's level of independence in the home and community environment.     Patient's spiritual, cultural and educational needs considered and pt agreeable to plan of care and goals.     Anticipated barriers to physical therapy: None Stated    Goals:   Short Term Goals: 4 weeks   - Patient will be able sit and stand for at least 30 minutes with minimal to no increase in symptoms for improved functional mobility overall. (MET: 2/13/24)  - Patient will demonstrate improved UE strength, especially into shoulder ER by at least 1/2 grade via MMT for increased stability and support with functional tasks.(MET: 2/13/24)  - Patient will demonstrate improved cervical range of motion, especially into side bending by at least 5 degrees for improved performance of household and community based activities. (MET: 2/13/24)     Long Term Goals: 8 weeks  - Patient will be able sit and stand for at least 1 hour with minimal to no increase in symptoms for improved functional mobility overall. (MET: 2/13/24)  - Patient will demonstrate improved UE strength, especially into shoulder scaption by at least 1/2 grade via MMT for increased stability and support with functional tasks. (MET: 2/13/24)  - Patient will demonstrate improved cervical range of motion, especially into cervical extension by at least 5 degrees for improved performance of household and community based activities. (MET: 2/13/24)  - Patient will demonstrate independence with Home Exercise Program for continued improvements outside the clinical setting. (MET: 2/13/24)  - Patient will demonstrate improved FOTO score that is greater than or equal to the predicted value for increased ability to perform ADL's. (MET: 2/13/24)     Plan     This patient is discharged from skilled outpatient Physical Therapy services.    Jenelle Sim, PT, DPT, Cert. DN

## 2025-01-17 ENCOUNTER — OFFICE VISIT (OUTPATIENT)
Dept: FAMILY MEDICINE | Facility: CLINIC | Age: 55
End: 2025-01-17
Payer: COMMERCIAL

## 2025-01-17 ENCOUNTER — LAB VISIT (OUTPATIENT)
Dept: LAB | Facility: HOSPITAL | Age: 55
End: 2025-01-17
Attending: DIETITIAN, REGISTERED
Payer: COMMERCIAL

## 2025-01-17 VITALS
DIASTOLIC BLOOD PRESSURE: 75 MMHG | WEIGHT: 196.31 LBS | TEMPERATURE: 97 F | SYSTOLIC BLOOD PRESSURE: 129 MMHG | BODY MASS INDEX: 37.06 KG/M2 | HEART RATE: 73 BPM | OXYGEN SATURATION: 98 % | HEIGHT: 61 IN

## 2025-01-17 DIAGNOSIS — G89.29 CHRONIC PAIN OF RIGHT KNEE: ICD-10-CM

## 2025-01-17 DIAGNOSIS — Z23 NEED FOR VACCINATION: ICD-10-CM

## 2025-01-17 DIAGNOSIS — Z11.59 NEED FOR HEPATITIS C SCREENING TEST: ICD-10-CM

## 2025-01-17 DIAGNOSIS — M25.561 CHRONIC PAIN OF RIGHT KNEE: ICD-10-CM

## 2025-01-17 DIAGNOSIS — Z11.4 ENCOUNTER FOR SCREENING FOR HIV: ICD-10-CM

## 2025-01-17 DIAGNOSIS — R40.4 TRANSIENT ALTERATION OF AWARENESS: Primary | ICD-10-CM

## 2025-01-17 DIAGNOSIS — Z12.31 BREAST CANCER SCREENING BY MAMMOGRAM: ICD-10-CM

## 2025-01-17 DIAGNOSIS — Z79.899 ENCOUNTER FOR LONG-TERM CURRENT USE OF MEDICATION: ICD-10-CM

## 2025-01-17 DIAGNOSIS — M77.12 LATERAL EPICONDYLITIS OF LEFT ELBOW: ICD-10-CM

## 2025-01-17 DIAGNOSIS — B37.2 YEAST INFECTION OF THE SKIN: ICD-10-CM

## 2025-01-17 PROBLEM — R73.03 PRE-DIABETES: Status: ACTIVE | Noted: 2025-01-17

## 2025-01-17 PROBLEM — R06.83 SNORES: Status: RESOLVED | Noted: 2025-01-17 | Resolved: 2025-01-17

## 2025-01-17 PROBLEM — M25.539 PAIN IN WRIST: Status: ACTIVE | Noted: 2025-01-17

## 2025-01-17 PROBLEM — E66.01 MORBID OBESITY: Status: ACTIVE | Noted: 2020-01-18

## 2025-01-17 PROBLEM — I10 BENIGN ESSENTIAL HYPERTENSION: Status: ACTIVE | Noted: 2025-01-17

## 2025-01-17 PROBLEM — M54.50 LOW BACK PAIN: Status: ACTIVE | Noted: 2025-01-17

## 2025-01-17 PROBLEM — G45.9 TIA (TRANSIENT ISCHEMIC ATTACK): Status: RESOLVED | Noted: 2020-01-18 | Resolved: 2025-01-17

## 2025-01-17 PROBLEM — R13.14 DYSPHAGIA, PHARYNGOESOPHAGEAL PHASE: Status: ACTIVE | Noted: 2018-11-19

## 2025-01-17 PROBLEM — R73.9 HYPERGLYCEMIA: Status: RESOLVED | Noted: 2020-01-18 | Resolved: 2025-01-17

## 2025-01-17 PROBLEM — M54.2 NECK PAIN: Status: ACTIVE | Noted: 2025-01-17

## 2025-01-17 PROBLEM — R25.2 SPASM: Status: RESOLVED | Noted: 2025-01-17 | Resolved: 2025-01-17

## 2025-01-17 PROBLEM — K64.4 EXTERNAL HEMORRHOIDS: Status: ACTIVE | Noted: 2025-01-17

## 2025-01-17 PROBLEM — M17.32 POST-TRAUMATIC OSTEOARTHRITIS OF LEFT KNEE: Status: ACTIVE | Noted: 2017-04-13

## 2025-01-17 PROBLEM — M54.30 SCIATICA: Status: ACTIVE | Noted: 2025-01-17

## 2025-01-17 PROBLEM — D64.9 ANEMIA: Status: ACTIVE | Noted: 2025-01-17

## 2025-01-17 PROBLEM — E78.2 MIXED HYPERLIPIDEMIA: Status: ACTIVE | Noted: 2020-08-14

## 2025-01-17 PROBLEM — Z12.11 ENCOUNTER FOR SCREENING FOR MALIGNANT NEOPLASM OF COLON: Status: RESOLVED | Noted: 2025-01-17 | Resolved: 2025-01-17

## 2025-01-17 PROBLEM — G43.909 MIGRAINE: Status: ACTIVE | Noted: 2025-01-17

## 2025-01-17 PROBLEM — R20.8 DYSESTHESIA: Status: RESOLVED | Noted: 2025-01-17 | Resolved: 2025-01-17

## 2025-01-17 LAB
25(OH)D3+25(OH)D2 SERPL-MCNC: 30 NG/ML (ref 30–96)
ALBUMIN SERPL BCP-MCNC: 4.3 G/DL (ref 3.5–5.2)
ALP SERPL-CCNC: 84 U/L (ref 40–150)
ALT SERPL W/O P-5'-P-CCNC: 16 U/L (ref 10–44)
ANION GAP SERPL CALC-SCNC: 10 MMOL/L (ref 8–16)
AST SERPL-CCNC: 20 U/L (ref 10–40)
BASOPHILS # BLD AUTO: 0.04 K/UL (ref 0–0.2)
BASOPHILS NFR BLD: 0.6 % (ref 0–1.9)
BILIRUB SERPL-MCNC: 0.3 MG/DL (ref 0.1–1)
BUN SERPL-MCNC: 9 MG/DL (ref 6–20)
CALCIUM SERPL-MCNC: 8.8 MG/DL (ref 8.7–10.5)
CHLORIDE SERPL-SCNC: 107 MMOL/L (ref 95–110)
CHOLEST SERPL-MCNC: 248 MG/DL (ref 120–199)
CHOLEST/HDLC SERPL: 5.5 {RATIO} (ref 2–5)
CO2 SERPL-SCNC: 26 MMOL/L (ref 23–29)
CREAT SERPL-MCNC: 0.7 MG/DL (ref 0.5–1.4)
DIFFERENTIAL METHOD BLD: ABNORMAL
EOSINOPHIL # BLD AUTO: 0.3 K/UL (ref 0–0.5)
EOSINOPHIL NFR BLD: 3.6 % (ref 0–8)
ERYTHROCYTE [DISTWIDTH] IN BLOOD BY AUTOMATED COUNT: 14.8 % (ref 11.5–14.5)
EST. GFR  (NO RACE VARIABLE): >60 ML/MIN/1.73 M^2
ESTIMATED AVG GLUCOSE: 120 MG/DL (ref 68–131)
GLUCOSE SERPL-MCNC: 91 MG/DL (ref 70–110)
HBA1C MFR BLD: 5.8 % (ref 4–5.6)
HCT VFR BLD AUTO: 40.6 % (ref 37–48.5)
HCV AB SERPL QL IA: NORMAL
HDLC SERPL-MCNC: 45 MG/DL (ref 40–75)
HDLC SERPL: 18.1 % (ref 20–50)
HGB BLD-MCNC: 12.3 G/DL (ref 12–16)
HIV 1+2 AB+HIV1 P24 AG SERPL QL IA: NORMAL
IMM GRANULOCYTES # BLD AUTO: 0.01 K/UL (ref 0–0.04)
IMM GRANULOCYTES NFR BLD AUTO: 0.1 % (ref 0–0.5)
LDLC SERPL CALC-MCNC: 184.2 MG/DL (ref 63–159)
LYMPHOCYTES # BLD AUTO: 3.1 K/UL (ref 1–4.8)
LYMPHOCYTES NFR BLD: 43.9 % (ref 18–48)
MCH RBC QN AUTO: 22 PG (ref 27–31)
MCHC RBC AUTO-ENTMCNC: 30.3 G/DL (ref 32–36)
MCV RBC AUTO: 73 FL (ref 82–98)
MONOCYTES # BLD AUTO: 0.7 K/UL (ref 0.3–1)
MONOCYTES NFR BLD: 10 % (ref 4–15)
NEUTROPHILS # BLD AUTO: 2.9 K/UL (ref 1.8–7.7)
NEUTROPHILS NFR BLD: 41.8 % (ref 38–73)
NONHDLC SERPL-MCNC: 203 MG/DL
NRBC BLD-RTO: 0 /100 WBC
PLATELET # BLD AUTO: 255 K/UL (ref 150–450)
PMV BLD AUTO: 10.3 FL (ref 9.2–12.9)
POTASSIUM SERPL-SCNC: 4.3 MMOL/L (ref 3.5–5.1)
PROT SERPL-MCNC: 7.8 G/DL (ref 6–8.4)
RBC # BLD AUTO: 5.59 M/UL (ref 4–5.4)
SODIUM SERPL-SCNC: 143 MMOL/L (ref 136–145)
TRIGL SERPL-MCNC: 94 MG/DL (ref 30–150)
TSH SERPL DL<=0.005 MIU/L-ACNC: 1.19 UIU/ML (ref 0.4–4)
VIT B12 SERPL-MCNC: 758 PG/ML (ref 210–950)
WBC # BLD AUTO: 7 K/UL (ref 3.9–12.7)

## 2025-01-17 PROCEDURE — G2211 COMPLEX E/M VISIT ADD ON: HCPCS | Mod: S$GLB,,, | Performed by: DIETITIAN, REGISTERED

## 2025-01-17 PROCEDURE — 90677 PCV20 VACCINE IM: CPT | Mod: S$GLB,,, | Performed by: DIETITIAN, REGISTERED

## 2025-01-17 PROCEDURE — 86803 HEPATITIS C AB TEST: CPT | Performed by: DIETITIAN, REGISTERED

## 2025-01-17 PROCEDURE — 82306 VITAMIN D 25 HYDROXY: CPT | Performed by: DIETITIAN, REGISTERED

## 2025-01-17 PROCEDURE — 85025 COMPLETE CBC W/AUTO DIFF WBC: CPT | Performed by: DIETITIAN, REGISTERED

## 2025-01-17 PROCEDURE — 3074F SYST BP LT 130 MM HG: CPT | Mod: CPTII,S$GLB,, | Performed by: DIETITIAN, REGISTERED

## 2025-01-17 PROCEDURE — 84443 ASSAY THYROID STIM HORMONE: CPT | Performed by: DIETITIAN, REGISTERED

## 2025-01-17 PROCEDURE — 80061 LIPID PANEL: CPT | Performed by: DIETITIAN, REGISTERED

## 2025-01-17 PROCEDURE — 80053 COMPREHEN METABOLIC PANEL: CPT | Performed by: DIETITIAN, REGISTERED

## 2025-01-17 PROCEDURE — 3008F BODY MASS INDEX DOCD: CPT | Mod: CPTII,S$GLB,, | Performed by: DIETITIAN, REGISTERED

## 2025-01-17 PROCEDURE — 99999 PR PBB SHADOW E&M-EST. PATIENT-LVL V: CPT | Mod: PBBFAC,,, | Performed by: DIETITIAN, REGISTERED

## 2025-01-17 PROCEDURE — 36415 COLL VENOUS BLD VENIPUNCTURE: CPT | Mod: PO | Performed by: DIETITIAN, REGISTERED

## 2025-01-17 PROCEDURE — 90472 IMMUNIZATION ADMIN EACH ADD: CPT | Mod: S$GLB,,, | Performed by: DIETITIAN, REGISTERED

## 2025-01-17 PROCEDURE — 1160F RVW MEDS BY RX/DR IN RCRD: CPT | Mod: CPTII,S$GLB,, | Performed by: DIETITIAN, REGISTERED

## 2025-01-17 PROCEDURE — 99204 OFFICE O/P NEW MOD 45 MIN: CPT | Mod: 25,S$GLB,, | Performed by: DIETITIAN, REGISTERED

## 2025-01-17 PROCEDURE — 1159F MED LIST DOCD IN RCRD: CPT | Mod: CPTII,S$GLB,, | Performed by: DIETITIAN, REGISTERED

## 2025-01-17 PROCEDURE — 90471 IMMUNIZATION ADMIN: CPT | Mod: S$GLB,,, | Performed by: DIETITIAN, REGISTERED

## 2025-01-17 PROCEDURE — 87389 HIV-1 AG W/HIV-1&-2 AB AG IA: CPT | Performed by: DIETITIAN, REGISTERED

## 2025-01-17 PROCEDURE — 3078F DIAST BP <80 MM HG: CPT | Mod: CPTII,S$GLB,, | Performed by: DIETITIAN, REGISTERED

## 2025-01-17 PROCEDURE — 82607 VITAMIN B-12: CPT | Performed by: DIETITIAN, REGISTERED

## 2025-01-17 PROCEDURE — 90750 HZV VACC RECOMBINANT IM: CPT | Mod: S$GLB,,, | Performed by: DIETITIAN, REGISTERED

## 2025-01-17 PROCEDURE — 83036 HEMOGLOBIN GLYCOSYLATED A1C: CPT | Performed by: DIETITIAN, REGISTERED

## 2025-01-17 RX ORDER — NYSTATIN 100000 [USP'U]/G
POWDER TOPICAL 2 TIMES DAILY
Qty: 60 G | Refills: 2 | Status: SHIPPED | OUTPATIENT
Start: 2025-01-17

## 2025-01-17 NOTE — PATIENT INSTRUCTIONS
Alirio Hager,     If you are due for any health screening(s) below please notify me so we can arrange them to be ordered and scheduled. Most healthy patients at your age complete them, but you are free to accept or refuse.     If you can't do it, I'll definitely understand. If you can, I'd certainly appreciate it!    Tests to Keep You Healthy    Mammogram: DUE  Colon Cancer Screening: Met on 11/29/2023      Schedule your breast cancer screening today     Breast cancer is the second most common cancer in women,  and the second leading cause of death from cancer. Mammograms can detect breast cancer early, which significantly increases the chances of curing the cancer.       Our records indicate that you may be overdue for breast cancer screening. Cancer screenings save lives, so schedule yours today to stay healthy.     If you recently had a mammogram performed outside of [facility], please let your Health care team know so that they can update your health record.

## 2025-01-17 NOTE — PROGRESS NOTES
PLAN:    Assessment & Plan    IMPRESSION:  - Assessed hip pain radiating down the leg, considering potential sciatica  - Evaluated reported episodes of transient alteration of awareness, suspecting possible neurological issue  - Considered tennis elbow (lateral epicondylitis) as cause of elbow pain  - Evaluated recurring rash under breasts, likely fungal infection    HIP PAIN:  - Evaluated the patient's reported pain in the hip area radiating down into the leg, stopping at the knee.  - Considered potential diagnoses based on the patient's symptoms.    EPISODES OF ALTERED AWARENESS:  - Evaluated the patient's episodes of transient alteration of awareness, occurring 3-4 times per month for about 2 years, lasting 3-5 minutes each.  - Noted that the patient remains aware during episodes, able to see and recognize people around them.  - Referred the patient to Neurology for further evaluation of these episodes.    SLEEP APNEA:  - Noted that the patient previously used CPAP but had to return the machine due to loss of insurance.  - Acknowledged the need to re-establish CPAP treatment.    PREDIABETES:  - Reviewed the patient's previous diagnosis of borderline diabetes and prescription of Jardiance.  - Noted the patient's previous A1c was 6.1 four years ago.  - Ordered A1C and Microalbumin to creatinine ratio tests to reassess prediabetes status.  - Planned follow-up based on test results, potentially extending to 6 months if not diabetic.    LATERAL EPICONDYLITIS:  - Diagnosed lateral epicondylitis (tennis elbow) based on the patient's reported elbow pain and numbness after leaning on it for about 5 minutes.  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  - Educated the patient about lateral epicondylitis and its potential causes.  - Advised the patient to avoid motions that trigger tennis elbow pain.  - Recommend using a tennis elbow brace to restrict motion.    YEAST  INFECTION:  - Evaluated the patient's reported frequent rash under breasts and down torso.  - Confirmed redness and location of the rash during exam.  - Assessed the rash as likely yeast infection due to moist environment.  - Prescribed nystatin powder for treatment of the yeast infection.    PNEUMOCOCCAL VACCINATION:  - Educated the patient about the pneumonia vaccine's protection against Streptococcus pneumoniae, a common cause of sinusitis and community-acquired pneumonia.  - Administered Prevnar 20 (pneumococcal vaccine).    SHINGLES VACCINATION:  - Discussed the importance of the shingles vaccine for those who have had chicken pox, noting it can affect any nerve root in the body.  - Administered Shingrix (shingles vaccine), 1st dose.  - Scheduled a nurse visit for the next shingles shot.    INFLUENZA VACCINATION:  - Informed the patient about the current flu vaccine's reduced effectiveness this year.    KNEE SWELLING:  - Evaluated the patient's reported knee swelling.  - Reviewed history of meniscal tear and repair in the right knee.  - Referred the patient to Orthopedics for knee evaluation.  - Referred the patient to Dr. Haywood for potential steroid injection in right knee.    LABS:  - Ordered CBC, CMP, Hepatitis C screening, HIV screening, TSH, B12 level, Vitamin D level, Lipid panel, and Urinalysis for protein.    FOLLOW UP:  - Follow up in 3 months, may extend to 6 months if lab results are favorable.  - Results will be communicated via [employer].        Problem List Items Addressed This Visit       Lateral epicondylitis of left elbow    Chronic pain of right knee    Relevant Orders    Ambulatory referral/consult to Orthopedics    Transient alteration of awareness - Primary    Relevant Orders    Ambulatory referral/consult to Neurology     Other Visit Diagnoses       Yeast infection of the skin        Relevant Medications    nystatin (MYCOSTATIN) powder    Encounter for long-term current use of medication         Relevant Orders    CBC Auto Differential    Comprehensive Metabolic Panel    Hemoglobin A1C    Lipid Panel    Microalbumin/Creatinine Ratio, Urine    TSH    Vitamin D    Vitamin B12    Need for hepatitis C screening test        Relevant Orders    Hepatitis C Antibody    Encounter for screening for HIV        Relevant Orders    HIV 1/2 Ag/Ab (4th Gen)    Breast cancer screening by mammogram        Relevant Orders    Mammo Digital Screening Bilat w/ Victor Hugo    Need for vaccination        Relevant Medications    pneumoc 20-vishal conj-dip cr(PF) (PREVNAR-20 (PF)) injection Syrg 0.5 mL (Completed)    varicella zoster (Shingrix) IM vaccine (>/= 51 yo) (Completed)          Future Appointments       Date Provider Specialty Appt Notes    1/31/2025 Bari Grullon MD Orthopedics Chronic pain of right knee [M25.561, G89.29]    1/31/2025  Radiology mammo    4/21/2025 Manju Christensen, P Neurology Transient alteration of awareness [R40.4]           Medication Management for assessment above:   Medication List with Changes/Refills   New Medications    NYSTATIN (MYCOSTATIN) POWDER    Apply topically 2 (two) times daily.       Brook Carranza DO, RDN  ==========================================================================  Subjective:   Patient ID: Madan More is a 54 y.o. female.  has a past medical history of Dysesthesia (01/17/2025), Esophageal obstruction (12/22/2016), GERD (gastroesophageal reflux disease), Hyperglycemia (01/18/2020), Prediabetes, Snores (01/17/2025), Spasm (01/17/2025), and TIA (transient ischemic attack) (01/18/2020).   Chief Complaint: Establish Care      History of Present Illness    CHIEF COMPLAINT:  Patient presents today to establish care after being without insurance for a year.    NEUROLOGICAL SYMPTOMS:  She experiences episodes of transient alteration of awareness occurring 3-4 times monthly for approximately two years. During these episodes, she is unable to move or speak and  experiences full-body numbness similar to epidural sensation, sometimes accompanied by headaches. She denies any triggering trauma. She has a history of possible TIA, with a CT performed but no MRI.    MUSCULOSKELETAL:  She reports hip pain radiating down to the knee. She also experiences tennis elbow symptoms with numbness after leaning on the elbow for approximately five minutes, possibly related to repetitive motions from her work as a . She has a history of right knee meniscus repair in 2015.    MEDICAL CONDITIONS:  She has pre-diabetes and uses CPAP for sleep apnea management.    GENITOURINARY:  She reports persistent hematuria without other urinary symptoms.    SKIN:  She experiences a recurrent rash under her breasts that becomes red and extends downward, previously treated with steroid injections.    PREVENTIVE CARE:  Last mammogram was performed in August 2023 at [facility].    FAMILY HISTORY:  She denies any significant family medical history.    ALLERGIES:  She reports allergy to penicillin.      ROS:  General: -fever, -chills, -fatigue, -weight gain, -weight loss  Eyes: -vision changes, -redness, -discharge  ENT: -ear pain, -nasal congestion, -sore throat  Cardiovascular: -chest pain, -palpitations, -lower extremity edema  Respiratory: -cough, -shortness of breath  Gastrointestinal: -abdominal pain, -nausea, -vomiting, -diarrhea, -constipation, -blood in stool  Genitourinary: -dysuria, +hematuria, -frequency  Musculoskeletal: +joint pain, -muscle pain  Skin: +rash, -lesion  Neurological: +headache, -dizziness, -numbness, -tingling  Psychiatric: -anxiety, -depression, -sleep difficulty          Problem List Items Addressed This Visit       Lateral epicondylitis of left elbow    Chronic pain of right knee    Transient alteration of awareness - Primary     Other Visit Diagnoses       Yeast infection of the skin        Encounter for long-term current use of medication      "   Need for hepatitis C screening test        Encounter for screening for HIV        Breast cancer screening by mammogram        Need for vaccination                 Review of patient's allergies indicates:   Allergen Reactions    Penicillins Hives and Swelling     Current Outpatient Medications   Medication Instructions    nystatin (MYCOSTATIN) powder Topical (Top), 2 times daily      I have reviewed the PMH, social history, FamilyHx, surgical history, allergies and medications documented / confirmed by the patient at the time of this visit.    Objective:   /75   Pulse 73   Temp 97.3 °F (36.3 °C)   Ht 5' 1" (1.549 m)   Wt 89 kg (196 lb 4.8 oz)   SpO2 98%   BMI 37.09 kg/m²   Physical Exam    General: No acute distress. Well-developed. Well-nourished.  Eyes: EOMI. Sclerae anicteric.  HENT: Normocephalic. Atraumatic. Nares patent. Moist oral mucosa.  Ears: Bilateral TMs clear. Bilateral EACs clear.  Cardiovascular: Regular rate. Regular rhythm. No murmurs. No rubs. No gallops. Normal S1, S2.  Respiratory: Normal respiratory effort. Clear to auscultation bilaterally. No rales. No rhonchi. No wheezing.  Abdomen: Soft. Non-tender. Non-distended. Normoactive bowel sounds.  Musculoskeletal: No  obvious deformity.  Extremities: No lower extremity edema.  Neurological: Alert & oriented x3. No slurred speech. Normal gait.  Psychiatric: Normal mood. Normal affect. Good insight. Good judgment.  Skin: Warm. Dry. No rash.          Assessment:     1. Transient alteration of awareness    2. Yeast infection of the skin    3. Lateral epicondylitis of left elbow    4. Chronic pain of right knee    5. Encounter for long-term current use of medication    6. Need for hepatitis C screening test    7. Encounter for screening for HIV    8. Breast cancer screening by mammogram    9. Need for vaccination      MDM:   Moderate complexity, more than 2 chronic conditions requiring medication management, unique testing, interpretation " of testing and follow up.    Visit today included increased complexity associated with the care of the episodic problem see above assessment addressed and managing the longitudinal care of the patient due to the serious and/or complex managed problem(s) see above.    I have reviewed and summarized old records.  I have performed thorough medication reconciliation today and discussed risk and benefits of medications.  I have reviewed labs and discussed with patient.  All questions were answered.    I have signed for the following orders AND/OR meds.  Orders Placed This Encounter   Procedures    Mammo Digital Screening Bilat w/ Victor Hugo     Standing Status:   Future     Standing Expiration Date:   1/17/2027     Order Specific Question:   May the Radiologist modify the order per protocol to meet the clinical needs of the patient?     Answer:   Yes     Order Specific Question:   Release to patient     Answer:   Immediate    CBC Auto Differential     Standing Status:   Future     Number of Occurrences:   1     Standing Expiration Date:   3/18/2026     Order Specific Question:   Send normal result to authorizing provider's In Basket if patient is active on MyChart:     Answer:   No    Comprehensive Metabolic Panel     Standing Status:   Future     Number of Occurrences:   1     Standing Expiration Date:   1/17/2026     Order Specific Question:   Send normal result to authorizing provider's In Basket if patient is active on MyChart:     Answer:   No    Hemoglobin A1C     Standing Status:   Future     Number of Occurrences:   1     Standing Expiration Date:   1/17/2026     Order Specific Question:   Send normal result to authorizing provider's In Basket if patient is active on MyChart:     Answer:   Yes    Hepatitis C Antibody     Standing Status:   Future     Number of Occurrences:   1     Standing Expiration Date:   1/17/2026     Order Specific Question:   Release to patient     Answer:   Immediate     Order Specific Question:    Send normal result to authorizing provider's In Basket if patient is active on MyChart:     Answer:   Yes    HIV 1/2 Ag/Ab (4th Gen)     Standing Status:   Future     Number of Occurrences:   1     Standing Expiration Date:   3/17/2026    Lipid Panel     Standing Status:   Future     Number of Occurrences:   1     Standing Expiration Date:   1/17/2026     Order Specific Question:   Send normal result to authorizing provider's In Basket if patient is active on MyChart:     Answer:   Yes    Microalbumin/Creatinine Ratio, Urine     Standing Status:   Future     Number of Occurrences:   1     Standing Expiration Date:   1/17/2026     Order Specific Question:   Specimen Source     Answer:   Urine     Order Specific Question:   Send normal result to authorizing provider's In Basket if patient is active on MyChart:     Answer:   Yes    TSH     Standing Status:   Future     Number of Occurrences:   1     Standing Expiration Date:   1/17/2026     Order Specific Question:   Send normal result to authorizing provider's In Basket if patient is active on MyChart:     Answer:   No    Vitamin D     Standing Status:   Future     Number of Occurrences:   1     Standing Expiration Date:   3/18/2026     Order Specific Question:   Send normal result to authorizing provider's In Basket if patient is active on MyChart:     Answer:   No    Vitamin B12     Standing Status:   Future     Number of Occurrences:   1     Standing Expiration Date:   3/18/2026     Order Specific Question:   Send normal result to authorizing provider's In Basket if patient is active on MyChart:     Answer:   No    Ambulatory referral/consult to Neurology     Standing Status:   Future     Standing Expiration Date:   2/17/2026     Referral Priority:   Routine     Referral Type:   Consultation     Referral Reason:   Specialty Services Required     Requested Specialty:   Neurology     Number of Visits Requested:   1    Ambulatory referral/consult to Orthopedics      Standing Status:   Future     Standing Expiration Date:   2/17/2026     Referral Priority:   Routine     Referral Type:   Consultation     Referred to Provider:   Bari Grullon MD     Requested Specialty:   Orthopedic Surgery     Number of Visits Requested:   1     Medications Ordered This Encounter   Medications    nystatin (MYCOSTATIN) powder     Sig: Apply topically 2 (two) times daily.     Dispense:  60 g     Refill:  2    pneumoc 20-vishal conj-dip cr(PF) (PREVNAR-20 (PF)) injection Syrg 0.5 mL    varicella zoster (Shingrix) IM vaccine (>/= 51 yo)        Follow up in about 3 months (around 4/17/2025).  Future Appointments       Date Provider Specialty Appt Notes    1/31/2025 Bari Grullon MD Orthopedics Chronic pain of right knee [M25.561, G89.29]    1/31/2025  Radiology mammo    4/21/2025 Manju Christensen, P Neurology Transient alteration of awareness [R40.4]            If no improvement in symptoms or symptoms worsen, advised to call/follow-up at clinic or go to ER. Patient voiced understanding and all questions/concerns were addressed.     DISCLAIMER: This note was compiled by using a speech recognition dictation system and therefore please be aware that typographical / speech recognition errors can and do occur.  Please contact me if you see any errors specifically.     This note was generated with the assistance of ambient listening technology. Verbal consent was obtained by the patient and accompanying visitor(s) for the recording of patient appointment to facilitate this note. I attest to having reviewed and edited the generated note for accuracy, though some syntax or spelling errors may persist. Please contact the author of this note for any clarification.      Brook Carranza DO, RDN    Office: [phone]   [address] [place] [address]  FAX: [phone]

## 2025-01-24 ENCOUNTER — PATIENT MESSAGE (OUTPATIENT)
Dept: FAMILY MEDICINE | Facility: CLINIC | Age: 55
End: 2025-01-24
Payer: COMMERCIAL

## 2025-01-24 DIAGNOSIS — M25.561 CHRONIC PAIN OF RIGHT KNEE: Primary | ICD-10-CM

## 2025-01-24 DIAGNOSIS — G89.29 CHRONIC PAIN OF RIGHT KNEE: Primary | ICD-10-CM

## 2025-01-27 ENCOUNTER — OFFICE VISIT (OUTPATIENT)
Dept: ORTHOPEDICS | Facility: CLINIC | Age: 55
End: 2025-01-27
Payer: COMMERCIAL

## 2025-01-27 ENCOUNTER — HOSPITAL ENCOUNTER (OUTPATIENT)
Dept: RADIOLOGY | Facility: HOSPITAL | Age: 55
Discharge: HOME | End: 2025-01-27
Attending: STUDENT IN AN ORGANIZED HEALTH CARE EDUCATION/TRAINING PROGRAM
Payer: COMMERCIAL

## 2025-01-27 VITALS — WEIGHT: 196 LBS | HEIGHT: 61 IN | BODY MASS INDEX: 37 KG/M2

## 2025-01-27 DIAGNOSIS — M25.561 CHRONIC PAIN OF RIGHT KNEE: ICD-10-CM

## 2025-01-27 DIAGNOSIS — M17.11 PRIMARY OSTEOARTHRITIS OF RIGHT KNEE: Primary | ICD-10-CM

## 2025-01-27 DIAGNOSIS — G89.29 CHRONIC PAIN OF RIGHT KNEE: ICD-10-CM

## 2025-01-27 DIAGNOSIS — E78.2 MIXED HYPERLIPIDEMIA: Primary | ICD-10-CM

## 2025-01-27 DIAGNOSIS — D50.9 MICROCYTIC ANEMIA: ICD-10-CM

## 2025-01-27 PROCEDURE — 1160F RVW MEDS BY RX/DR IN RCRD: CPT | Mod: CPTII,S$GLB,, | Performed by: STUDENT IN AN ORGANIZED HEALTH CARE EDUCATION/TRAINING PROGRAM

## 2025-01-27 PROCEDURE — 73564 X-RAY EXAM KNEE 4 OR MORE: CPT | Mod: 26,RT,, | Performed by: RADIOLOGY

## 2025-01-27 PROCEDURE — 73562 X-RAY EXAM OF KNEE 3: CPT | Mod: 26,59,LT, | Performed by: RADIOLOGY

## 2025-01-27 PROCEDURE — 1159F MED LIST DOCD IN RCRD: CPT | Mod: CPTII,S$GLB,, | Performed by: STUDENT IN AN ORGANIZED HEALTH CARE EDUCATION/TRAINING PROGRAM

## 2025-01-27 PROCEDURE — 3044F HG A1C LEVEL LT 7.0%: CPT | Mod: CPTII,S$GLB,, | Performed by: STUDENT IN AN ORGANIZED HEALTH CARE EDUCATION/TRAINING PROGRAM

## 2025-01-27 PROCEDURE — 3008F BODY MASS INDEX DOCD: CPT | Mod: CPTII,S$GLB,, | Performed by: STUDENT IN AN ORGANIZED HEALTH CARE EDUCATION/TRAINING PROGRAM

## 2025-01-27 PROCEDURE — 99214 OFFICE O/P EST MOD 30 MIN: CPT | Mod: 25,S$GLB,, | Performed by: STUDENT IN AN ORGANIZED HEALTH CARE EDUCATION/TRAINING PROGRAM

## 2025-01-27 PROCEDURE — 99999 PR PBB SHADOW E&M-EST. PATIENT-LVL III: CPT | Mod: PBBFAC,,, | Performed by: STUDENT IN AN ORGANIZED HEALTH CARE EDUCATION/TRAINING PROGRAM

## 2025-01-27 PROCEDURE — 73562 X-RAY EXAM OF KNEE 3: CPT | Mod: TC,PO,LT

## 2025-01-27 PROCEDURE — 20611 DRAIN/INJ JOINT/BURSA W/US: CPT | Mod: RT,S$GLB,, | Performed by: STUDENT IN AN ORGANIZED HEALTH CARE EDUCATION/TRAINING PROGRAM

## 2025-01-27 PROCEDURE — 3066F NEPHROPATHY DOC TX: CPT | Mod: CPTII,S$GLB,, | Performed by: STUDENT IN AN ORGANIZED HEALTH CARE EDUCATION/TRAINING PROGRAM

## 2025-01-27 PROCEDURE — 3061F NEG MICROALBUMINURIA REV: CPT | Mod: CPTII,S$GLB,, | Performed by: STUDENT IN AN ORGANIZED HEALTH CARE EDUCATION/TRAINING PROGRAM

## 2025-01-27 RX ORDER — TRIAMCINOLONE ACETONIDE 40 MG/ML
40 INJECTION, SUSPENSION INTRA-ARTICULAR; INTRAMUSCULAR
Status: DISCONTINUED | OUTPATIENT
Start: 2025-01-27 | End: 2025-01-27 | Stop reason: HOSPADM

## 2025-01-27 RX ORDER — ROSUVASTATIN CALCIUM 20 MG/1
20 TABLET, COATED ORAL DAILY
Qty: 90 TABLET | Refills: 3 | Status: SHIPPED | OUTPATIENT
Start: 2025-01-27 | End: 2026-01-27

## 2025-01-27 RX ADMIN — TRIAMCINOLONE ACETONIDE 40 MG: 40 INJECTION, SUSPENSION INTRA-ARTICULAR; INTRAMUSCULAR at 09:01

## 2025-01-27 NOTE — PROGRESS NOTES
Patient ID: Madan More  YOB: 1970  MRN: [id number]    Chief Complaint: Pain of the Right Knee      Referred By: Brook Carranza DO for Right Knee Pain    History of Present Illness: Madan More is a left-hand dominant 54 y.o. female who presents today with right knee pain.  History of meniscus repair that she is unsure of what year, but believes it was between 2015 and 2017.  Reports that pain in the in knee returned in 2018 and she was receiving CSI to the knee.  Last CSI that she reports was one year ago.  States that the injections are helpful for approximately 3 months.  Pain is an 8/10 today and located on the medial aspect of her knee and the undersurface of her patella.  Reports instances of giving way and locking.  Unable to sit with leg fully extended for long periods of time.  Has tried CSI and PT in past with the last of both being approximately 1 year ago.  Currently using Alleve, ice, heat and Voltaren gel.  Reports that the ice has been the most helpful.  Patient has several braces that she has been using when having to do prolonged standing.       The patient is active in none.  Occupation:  /       Past Medical History:   Past Medical History:   Diagnosis Date    Dysesthesia 01/17/2025    Esophageal obstruction 12/22/2016    GERD (gastroesophageal reflux disease)     Hyperglycemia 01/18/2020    Prediabetes     Snores 01/17/2025    Spasm 01/17/2025    TIA (transient ischemic attack) 01/18/2020     Past Surgical History:   Procedure Laterality Date    ESOPHAGOGASTRODUODENOSCOPY N/A 11/19/2018    Procedure: EGD (ESOPHAGOGASTRODUODENOSCOPY);  Surgeon: Gael Marina MD;  Location: [facility];  Service: Endoscopy;  Laterality: N/A;    HYSTERECTOMY      KNEE SURGERY      OOPHORECTOMY       Family History   Problem Relation Name Age of Onset    Hypertension Mother      No Known Problems Father       Social History     Socioeconomic History     Marital status: Legally    Tobacco Use    Smoking status: Never    Smokeless tobacco: Never   Substance and Sexual Activity    Alcohol use: No    Drug use: No     Social Drivers of Health     Financial Resource Strain: Low Risk  (8/7/2020)    Overall Financial Resource Strain (CARDIA)     Difficulty of Paying Living Expenses: Not very hard   Food Insecurity: No Food Insecurity (8/7/2020)    Hunger Vital Sign     Worried About Running Out of Food in the Last Year: Never true     Ran Out of Food in the Last Year: Never true   Transportation Needs: No Transportation Needs (8/7/2020)    PRAPARE - Transportation     Lack of Transportation (Medical): No     Lack of Transportation (Non-Medical): No   Physical Activity: Insufficiently Active (8/7/2020)    Exercise Vital Sign     Days of Exercise per Week: 4 days     Minutes of Exercise per Session: 30 min   Stress: No Stress Concern Present (8/7/2020)    [employer] [place] [employer] - Occupational Stress Questionnaire     Feeling of Stress : Not at all     Medication List with Changes/Refills   Current Medications    NYSTATIN (MYCOSTATIN) POWDER    Apply topically 2 (two) times daily.     Review of patient's allergies indicates:   Allergen Reactions    Penicillins Hives and Swelling       Physical Exam:   Body mass index is 37.03 kg/m².    GENERAL: Well appearing, in no acute distress.  HEAD: Normocephalic and atraumatic.  ENT: External ears and nose grossly normal.  EYES: EOMI bilaterally  PULMONARY: Respirations are grossly even and non-labored.  NEURO: Awake, alert, and oriented x 3.  SKIN: No obvious rashes appreciated.  PSYCH: Mood & affect are appropriate.    Detailed MSK exam:     Right knee exam:   -ROM: extension 0, flexion 120  -TTP: Medial joint line  -effusion: none  -Patellar apprehension negative  -Tan test negative  -stable to varus and valgus stress tests  -Lachman test negative, anterior drawer test negative, posterior drawer test  negative    Left knee exam:   -ROM: extension 0, flexion 120  -TTP: None  -effusion: none  -Patellar apprehension negative  -Tan test negative  -stable to varus and valgus stress tests  -Lachman test negative, anterior drawer test negative, posterior drawer test negative      Imaging:  X-ray Knee Ortho Right with Flexion  Narrative: EXAMINATION:  XR KNEE ORTHO RIGHT WITH FLEXION    CLINICAL HISTORY:  Pain in right knee    TECHNIQUE:  AP standing as well as PA flexion standing and Merchant views of both knees were performed.  A lateral view of the right knee is also performed.    COMPARISON:  Prior radiographs    FINDINGS:  No acute osseous abnormality with similar bilateral multi compartment degenerative findings, most prevalent within the medial compartments.  No large joint effusion.  Impression: As above    Electronically signed by: Brian Shepherd MD  Date:    01/27/2025  Time:    09:38        Relevant imaging results were reviewed and interpreted by me and per my read shows mild to moderate arthritic changes.  This was discussed with the patient and / or family today.     Assessment:  Madan More is a 54 y.o. female presenting with right knee pain.   History, physical and radiographs are consistent with a likely diagnosis of OA.   Plan: Steroid injection given today (see separate procedure note for details). We discussed the proper protocols after the injection such as no submerging pools, baths tubs, or hot tubs for 24 hr.  Showering is okay today.  We also discussed that blood sugars can be elevated after an injection and asked patient to properly checked her sugars over the next few days and contact their PCP if there are any concerns.  We discussed red flags such as fevers, chills, red, warm, tender joint at the area of injection to please seek medical care immediately.   Prior auth for gel injections. Continue conservative management for pain.   Follow up for gel injections. All questions  answered.      Primary osteoarthritis of right knee  -     Sports Medicine US - Guidance for Needle Placement  -     Prior authorization Order  -     Large Joint Aspiration/Injection: R knee         Ultrasound guidance was used for needle localization. Images were saved and stored for documentation. The appropriate structures were visualized. Dynamic visualization of the needle was continuous throughout the procedures and maintained good position.      MEDICAL NECESSITY FOR VISCOSUPPLEMENTATION: After thorough evaluation of the patient, I have determined that visco-supplementation is medically necessary. The patient has painful degenerative changes of the knee with failure of conservative treatments including lifestyle modifications and rehabilitation exercises.  Oral analgesis/NSAIDs have not adequately controlled symptoms and there is radiographic evidence of Kellgren Jaron grade 2 or greater osteoarthritic changes, or in lack of radiographic evidence, there is arthroscopic or other evidence of chondrosis.       A copy of today's visit note has been sent to the referring provider.     Electronically signed:  Bari Grullon MD, MPH  01/27/2025  9:48 AM

## 2025-01-27 NOTE — PROCEDURES
Large Joint Aspiration/Injection: R knee    Date/Time: 1/27/2025 9:20 AM    Performed by: Bari Grullon MD  Authorized by: Bari Grullon MD    Consent Done?:  Yes (Verbal)  Indications:  Arthritis and pain  Site marked: the procedure site was marked    Timeout: prior to procedure the correct patient, procedure, and site was verified    Prep: patient was prepped and draped in usual sterile fashion    Local anesthetic:  Bupivacaine 0.5% without epinephrine and lidocaine 1% without epinephrine    Details:  Needle Size:  21 G  Ultrasonic Guidance for needle placement?: Yes    Images are saved and documented.  Approach:  Lateral (superior)  Location:  Knee  Site:  R knee  Medications:  40 mg triamcinolone acetonide 40 mg/mL  Patient tolerance:  Patient tolerated the procedure well with no immediate complications     Ultrasound guidance was used for needle localization. Images were saved and stored for documentation. The appropriate structures were visualized. Dynamic visualization of the needle was continuous throughout the procedures and maintained good position.

## 2025-01-27 NOTE — PROCEDURES
Sports Medicine US - Guidance for Needle Placement    Date/Time: 1/27/2025 9:20 AM    Performed by: Bari Grullon MD  Authorized by: Bari Grullon MD  Preparation: Patient was prepped and draped in the usual sterile fashion.  Local anesthesia used: no    Anesthesia:  Local anesthesia used: no    Sedation:  Patient sedated: no    Patient tolerance: patient tolerated the procedure well with no immediate complications  Comments: Ultrasound guidance was used for needle localization. Images were saved and stored for documentation. The appropriate structures were visualized. Dynamic visualization of the needle was continuous throughout the procedures and maintained good position.

## 2025-01-27 NOTE — PATIENT INSTRUCTIONS
Assessment:  Madan More is a 54 y.o. female   Chief Complaint   Patient presents with    Right Knee - Pain       Encounter Diagnosis   Name Primary?    Primary osteoarthritis of right knee Yes        Plan:  Ultrasound guided cortisone injection to the right knee  We discussed the proper protocols after the injection such as no submerging pools, baths tubs, or hot tubs for 24 hr.  Showering is okay today.  We also discussed that blood sugars can be elevated after an injection and asked patient to properly checked her sugars over the next few days and contact their PCP if there are any concerns.  We discussed red flags such as fevers, chills, red, warm, tender joint at the area of injection to please seek medical care immediately.    Pre authorization for 3 series gel injection to the right knee - Euflexxa  Follow up for gel injections    General Arthritis info:    -shiny white stuff at end of a chicken bones is cartilage    -arthritis is wearing away of the cartilage that lines the end of your bones    -osteoarthritis is thought to be a wear and tear phenomenon    -symptoms are due to inflammation of joint causing stiffness, aching, and sometimes swelling    -occasionally sharp pain will occur causing a give way sensation    -Risk factors: genetic, weight, female > male, age    Treatment options:    -maintain healthy weight (every pound is 4 pounds of pressure on the knee)    -daily moderate exercise (walk, bike, swim 30 minutes per day) to keep joints moving    -daily strengthening exercises (through therapy or on own) to keep muscles supporting joint healthy and strong    -glucosamine 1500mg daily (look for USP label on bottle)    -tylenol as needed for pain (follow directions on the bottle)    -anti-inflammatory medication such as alleve may be helpful- take 1-2 tabs twice daily for 7 days. If it helps your pain, continue. If you do not feel any change, you may stop and then take it as needed.    (you may  be given a once daily anti-inflammatory such as MOBIC. If given, avoid other anti-inflammatory medications such as advil, ibuprofen, motrin, naprosyn, alleve, etc)    -if swollen and painful, ice, decrease activity, and take anti-inflammatory daily for 5-7 days and if no relief call your doctor for further options    -consider cortisone injection (every 3-4 months at most)- anti- inflammatory steroid medication that can be injected directly into the joint to reduce inflammation    -consider hyaluronic acid injections (eufflexxa, hyalgan, synvisc, supartz) (every 6 months at most)- protein injection that helps decrease pain and irritability in the joint. It is best used to help prolong intervals between cortisone injections to minimize steroid injections. These are currently approved for knee injections. Discuss with your doctor if other joint involved. Call to seek approval prior to the injections.    -long-term treatment may include a total joint replacement (keep diary of good days and bad days, then evaluate as to when you are ready)      Follow-up: for gel injections.    Thank you for choosing [facility] and Dr. Bari Grullon for your orthopedic & sports medicine care. It is our goal to provide you with exceptional care that will help keep you healthy, active, and get you back in the game.    Please do not hesitate to reach out to us via email, phone, or MyChart with any questions, concerns, or feedback.    If you felt that you received exemplary care today, please consider leaving us feedback on [employer] at:  [email or web address]    If you are experiencing pain/discomfort ,or have questions after 5pm and would like to be connected to the [facility] on-call team, please call this number and specify which Sports Medicine provider is treating you: [phone]

## 2025-01-29 ENCOUNTER — LAB VISIT (OUTPATIENT)
Dept: LAB | Facility: HOSPITAL | Age: 55
End: 2025-01-29
Attending: DIETITIAN, REGISTERED
Payer: COMMERCIAL

## 2025-01-29 DIAGNOSIS — D50.9 MICROCYTIC ANEMIA: ICD-10-CM

## 2025-01-29 LAB
BASOPHILS # BLD AUTO: 0.02 K/UL (ref 0–0.2)
BASOPHILS NFR BLD: 0.1 % (ref 0–1.9)
DIFFERENTIAL METHOD BLD: ABNORMAL
EOSINOPHIL # BLD AUTO: 0 K/UL (ref 0–0.5)
EOSINOPHIL NFR BLD: 0 % (ref 0–8)
ERYTHROCYTE [DISTWIDTH] IN BLOOD BY AUTOMATED COUNT: 15.4 % (ref 11.5–14.5)
FERRITIN SERPL-MCNC: 87 NG/ML (ref 20–300)
FOLATE SERPL-MCNC: 10.3 NG/ML (ref 4–24)
HCT VFR BLD AUTO: 37.4 % (ref 37–48.5)
HGB BLD-MCNC: 11.3 G/DL (ref 12–16)
IMM GRANULOCYTES # BLD AUTO: 0.08 K/UL (ref 0–0.04)
IMM GRANULOCYTES NFR BLD AUTO: 0.5 % (ref 0–0.5)
IRON SERPL-MCNC: 55 UG/DL (ref 30–160)
LYMPHOCYTES # BLD AUTO: 2.4 K/UL (ref 1–4.8)
LYMPHOCYTES NFR BLD: 16 % (ref 18–48)
MCH RBC QN AUTO: 22 PG (ref 27–31)
MCHC RBC AUTO-ENTMCNC: 30.2 G/DL (ref 32–36)
MCV RBC AUTO: 73 FL (ref 82–98)
MONOCYTES # BLD AUTO: 0.9 K/UL (ref 0.3–1)
MONOCYTES NFR BLD: 5.9 % (ref 4–15)
NEUTROPHILS # BLD AUTO: 11.5 K/UL (ref 1.8–7.7)
NEUTROPHILS NFR BLD: 77.5 % (ref 38–73)
NRBC BLD-RTO: 0 /100 WBC
PLATELET # BLD AUTO: 271 K/UL (ref 150–450)
PMV BLD AUTO: 10.5 FL (ref 9.2–12.9)
RBC # BLD AUTO: 5.14 M/UL (ref 4–5.4)
RETICS/RBC NFR AUTO: 1.2 % (ref 0.5–2.5)
SATURATED IRON: 16 % (ref 20–50)
TOTAL IRON BINDING CAPACITY: 354 UG/DL (ref 250–450)
TRANSFERRIN SERPL-MCNC: 239 MG/DL (ref 200–375)
WBC # BLD AUTO: 14.82 K/UL (ref 3.9–12.7)

## 2025-01-29 PROCEDURE — 83020 HEMOGLOBIN ELECTROPHORESIS: CPT | Performed by: DIETITIAN, REGISTERED

## 2025-01-29 PROCEDURE — 82746 ASSAY OF FOLIC ACID SERUM: CPT | Performed by: DIETITIAN, REGISTERED

## 2025-01-29 PROCEDURE — 85025 COMPLETE CBC W/AUTO DIFF WBC: CPT | Performed by: DIETITIAN, REGISTERED

## 2025-01-29 PROCEDURE — 82728 ASSAY OF FERRITIN: CPT | Performed by: DIETITIAN, REGISTERED

## 2025-01-29 PROCEDURE — 83540 ASSAY OF IRON: CPT | Performed by: DIETITIAN, REGISTERED

## 2025-01-29 PROCEDURE — 85045 AUTOMATED RETICULOCYTE COUNT: CPT | Performed by: DIETITIAN, REGISTERED

## 2025-02-02 LAB
HB ELECTROPHORESIS INTERP CANCEL: NORMAL
HB ELECTROPHORESIS INTERPRETATION: NORMAL
HGB A MFR BLD ELPH: 97.3 % (ref 95.8–98)
HGB A2 MFR BLD: 2.7 % (ref 2–3.3)
HGB A2+XXX MFR BLD ELPH: NORMAL %
HGB F MFR BLD: 0 % (ref 0–0.9)
HGB XXX MFR BLD ELPH: NORMAL %
HPLC HB VARIANT: NORMAL

## 2025-02-04 ENCOUNTER — E-CONSULT (OUTPATIENT)
Dept: HEMATOLOGY/ONCOLOGY | Facility: CLINIC | Age: 55
End: 2025-02-04
Payer: COMMERCIAL

## 2025-02-04 DIAGNOSIS — D64.9 ANEMIA, UNSPECIFIED TYPE: ICD-10-CM

## 2025-02-04 DIAGNOSIS — R79.89 ABNORMAL CBC: Primary | ICD-10-CM

## 2025-02-04 DIAGNOSIS — R71.8 MICROCYTOSIS: Primary | ICD-10-CM

## 2025-02-04 PROCEDURE — 99451 NTRPROF PH1/NTRNET/EHR 5/>: CPT | Mod: S$GLB,,, | Performed by: INTERNAL MEDICINE

## 2025-02-04 NOTE — CONSULTS
[facility] - Hematology 5th Fl  Response for E-Consult     Patient Name: Madan More  MRN: [id number]  Primary Care Provider: Brook Carranza DO   Requesting Provider: Brook Carranza DO  Consults    Recommendation: Check one more lab. Agree ferritin looks normal and the hemoglobin electrophoresis excluded beta-thalassemias. Now check alpha gene analysis for possible alpha- thalassemia. This would be [id number].     Additional future steps to consider: The iron saturation is slightly low so there may be a component of iron deficiency. If the alpha gene analysis is normal, you may trial a course of oral iron to see if the MCV improves.    Total time of Consultation: 5 minute    I did not speak to the requesting provider verbally about this.     *This eConsult is based on the clinical data available to me and is furnished without benefit of a physical examination. The eConsult will need to be interpreted in light of any clinical issues or changes in patient status not available to me at the time of filing this eConsults. Significant changes in patient condition or level of acuity should result in immediate formal consultation and reevaluation. Please alert me if you have further questions.    Thank you for this eConsult referral.     Verito Araujo MD  [facility] - Hematology [facility]

## 2025-02-06 ENCOUNTER — LAB VISIT (OUTPATIENT)
Dept: LAB | Facility: HOSPITAL | Age: 55
End: 2025-02-06
Attending: DIETITIAN, REGISTERED
Payer: COMMERCIAL

## 2025-02-06 DIAGNOSIS — D64.9 ANEMIA, UNSPECIFIED TYPE: ICD-10-CM

## 2025-02-06 DIAGNOSIS — R79.89 ABNORMAL CBC: ICD-10-CM

## 2025-02-06 PROCEDURE — 81269 HBA1/HBA2 GENE DUP/DEL VRNTS: CPT | Performed by: DIETITIAN, REGISTERED

## 2025-02-14 PROBLEM — D56.3 ALPHA THALASSEMIA TRAIT: Status: ACTIVE | Noted: 2025-02-14

## 2025-02-14 LAB — MAYO MISCELLANEOUS RESULT (REF): NORMAL

## 2025-02-17 ENCOUNTER — RESULTS FOLLOW-UP (OUTPATIENT)
Dept: FAMILY MEDICINE | Facility: CLINIC | Age: 55
End: 2025-02-17

## 2025-02-25 LAB
LEFT EYE DM RETINOPATHY: NEGATIVE
RIGHT EYE DM RETINOPATHY: NEGATIVE

## 2025-03-05 ENCOUNTER — PATIENT OUTREACH (OUTPATIENT)
Dept: ADMINISTRATIVE | Facility: HOSPITAL | Age: 55
End: 2025-03-05
Payer: COMMERCIAL

## 2025-03-05 NOTE — PROGRESS NOTES
Manually uploaded and hyper-linked DM EYE EXAM 02.25.2025  Manually uploaded and hyper-linked Chlamydia / Gonorrheae 2.18.2020.

## 2025-03-14 ENCOUNTER — OFFICE VISIT (OUTPATIENT)
Dept: ORTHOPEDICS | Facility: CLINIC | Age: 55
End: 2025-03-14
Payer: COMMERCIAL

## 2025-03-14 VITALS — BODY MASS INDEX: 37 KG/M2 | HEIGHT: 61 IN | WEIGHT: 196 LBS

## 2025-03-14 DIAGNOSIS — M17.11 PRIMARY OSTEOARTHRITIS OF RIGHT KNEE: Primary | ICD-10-CM

## 2025-03-14 PROCEDURE — 99999 PR PBB SHADOW E&M-EST. PATIENT-LVL III: CPT | Mod: PBBFAC,,, | Performed by: STUDENT IN AN ORGANIZED HEALTH CARE EDUCATION/TRAINING PROGRAM

## 2025-03-14 NOTE — PROCEDURES
Sports Medicine US - Guidance for Needle Placement    Date/Time: 3/14/2025 9:20 AM    Performed by: Bari Grullon MD  Authorized by: Bari Grullon MD  Preparation: Patient was prepped and draped in the usual sterile fashion.  Local anesthesia used: no    Anesthesia:  Local anesthesia used: no    Sedation:  Patient sedated: no    Patient tolerance: patient tolerated the procedure well with no immediate complications  Comments: Ultrasound guidance was used for needle localization. Images were saved and stored for documentation. The appropriate structures were visualized. Dynamic visualization of the needle was continuous throughout the procedures and maintained good position.

## 2025-03-14 NOTE — PROCEDURES
Large Joint Aspiration/Injection: R knee    Date/Time: 3/14/2025 9:20 AM    Performed by: Bari Grullon MD  Authorized by: Bari Grullon MD    Consent Done?:  Yes (Verbal)  Indications:  Arthritis and pain  Site marked: the procedure site was marked    Timeout: prior to procedure the correct patient, procedure, and site was verified    Prep: patient was prepped and draped in usual sterile fashion    Local anesthetic:  Lidocaine 1% without epinephrine  Anesthetic total (ml):  2      Details:  Needle Size:  21 G  Ultrasonic Guidance for needle placement?: Yes    Images are saved and documented.  Approach:  Lateral (superior)  Location:  Knee  Site:  R knee  Medications:  20 mg sodium hyaluronate (EUFLEXXA) 10 mg/mL(mw 2.4 -3.6 million)  Patient tolerance:  Patient tolerated the procedure well with no immediate complications     Ultrasound guidance was used for needle localization. Images were saved and stored for documentation. The appropriate structures were visualized. Dynamic visualization of the needle was continuous throughout the procedures and maintained good position.

## 2025-03-14 NOTE — PATIENT INSTRUCTIONS
Assessment:  Madan More is a 54 y.o. female   Chief Complaint   Patient presents with    Right Knee - Pain    Follow-up       Encounter Diagnosis   Name Primary?    Primary osteoarthritis of right knee Yes        Plan:  Ultrasound guided Euflexxa to the right knee 1 of 3  Today you received a gel injection. Unlike steroid injections, these gel injections are a lot thicker and can feel different at first.   It is normal to feel some soreness in the first few days because the gel is expanding that joint space.   It is also normal to experience some swelling in the first few days.   If you are feeling discomfort or having swelling, use ice and tylenol to control symptoms.   It is better to keep the knee joint moving so that the gel can get throughout the joint space.   Sometimes it can take a few weeks for the gel injection to start showing noticeable effects. This is normal.   These gel injections can be repeated every 6 months as needed.   Follow up in 1 week    Follow-up: 1 week or sooner if there are problems between now and then.    Thank you for choosing [facility] and Dr. Bari Grullon for your orthopedic & sports medicine care. It is our goal to provide you with exceptional care that will help keep you healthy, active, and get you back in the game.    Please do not hesitate to reach out to us via email, phone, or MyChart with any questions, concerns, or feedback.    If you felt that you received exemplary care today, please consider leaving us feedback on [employer] at:  [email or web address]    If you are experiencing pain/discomfort ,or have questions after 5pm and would like to be connected to the [facility] [place] on-call team, please call this number and specify which Sports Medicine provider is treating you: [phone]

## 2025-03-21 ENCOUNTER — OFFICE VISIT (OUTPATIENT)
Dept: ORTHOPEDICS | Facility: CLINIC | Age: 55
End: 2025-03-21
Payer: COMMERCIAL

## 2025-03-21 VITALS — BODY MASS INDEX: 37 KG/M2 | HEIGHT: 61 IN | WEIGHT: 196 LBS

## 2025-03-21 DIAGNOSIS — M17.11 PRIMARY OSTEOARTHRITIS OF RIGHT KNEE: Primary | ICD-10-CM

## 2025-03-21 PROCEDURE — 99999 PR PBB SHADOW E&M-EST. PATIENT-LVL III: CPT | Mod: PBBFAC,,, | Performed by: STUDENT IN AN ORGANIZED HEALTH CARE EDUCATION/TRAINING PROGRAM

## 2025-03-21 NOTE — PATIENT INSTRUCTIONS
Assessment:  Madan More is a 54 y.o. female No chief complaint on file.      No diagnosis found.     Plan:  Ultrasound guided Euflexxa gel injection, 2 of 3 series, to right knee  Today you received a gel injection. Unlike steroid injections, these gel injections are a lot thicker and can feel different at first.   It is normal to feel some soreness in the first few days because the gel is expanding that joint space.   It is also normal to experience some swelling in the first few days.   If you are feeling discomfort or having swelling, use ice and tylenol to control symptoms.   It is better to keep the knee joint moving so that the gel can get throughout the joint space.   Sometimes it can take a few weeks for the gel injection to start showing noticeable effects. This is normal.   These gel injections can be repeated every 6 months as needed.   Follow up in 1 week to conclude series    Follow-up: 1 week or sooner if there are problems between now and then.    Thank you for choosing [facility] and Dr. Bari Grullon for your orthopedic & sports medicine care. It is our goal to provide you with exceptional care that will help keep you healthy, active, and get you back in the game.    Please do not hesitate to reach out to us via email, phone, or MyChart with any questions, concerns, or feedback.    If you felt that you received exemplary care today, please consider leaving us feedback on [employer] at:  [email or web address]    If you are experiencing pain/discomfort ,or have questions after 5pm and would like to be connected to the [facility] [place] on-call team, please call this number and specify which Sports Medicine provider is treating you: [phone]

## 2025-03-21 NOTE — PROCEDURES
Sports Medicine US - Guidance for Needle Placement    Date/Time: 3/21/2025 9:20 AM    Performed by: Bari Grullon MD  Authorized by: Bari Grullon MD  Preparation: Patient was prepped and draped in the usual sterile fashion.  Local anesthesia used: no    Anesthesia:  Local anesthesia used: no    Sedation:  Patient sedated: no    Patient tolerance: patient tolerated the procedure well with no immediate complications  Comments: Ultrasound guidance was used for needle localization. Images were saved and stored for documentation. The appropriate structures were visualized. Dynamic visualization of the needle was continuous throughout the procedures and maintained good position.

## 2025-03-21 NOTE — PROCEDURES
Large Joint Aspiration/Injection: R knee    Date/Time: 3/21/2025 9:20 AM    Performed by: Bari Grullon MD  Authorized by: Bari Grullon MD    Consent Done?:  Yes (Verbal)  Indications:  Arthritis and pain  Site marked: the procedure site was marked    Timeout: prior to procedure the correct patient, procedure, and site was verified    Prep: patient was prepped and draped in usual sterile fashion    Local anesthetic:  Bupivacaine 0.5% without epinephrine and lidocaine 1% without epinephrine    Details:  Needle Size:  21 G  Ultrasonic Guidance for needle placement?: Yes    Images are saved and documented.  Approach:  Lateral (superior)  Location:  Knee  Site:  R knee  Medications:  20 mg sodium hyaluronate (EUFLEXXA) 10 mg/mL(mw 2.4 -3.6 million)  Patient tolerance:  Patient tolerated the procedure well with no immediate complications     Ultrasound guidance was used for needle localization. Images were saved and stored for documentation. The appropriate structures were visualized. Dynamic visualization of the needle was continuous throughout the procedures and maintained good position.

## 2025-03-28 ENCOUNTER — OFFICE VISIT (OUTPATIENT)
Dept: ORTHOPEDICS | Facility: CLINIC | Age: 55
End: 2025-03-28
Payer: COMMERCIAL

## 2025-03-28 VITALS — BODY MASS INDEX: 37 KG/M2 | HEIGHT: 61 IN | WEIGHT: 196 LBS

## 2025-03-28 DIAGNOSIS — M17.11 PRIMARY OSTEOARTHRITIS OF RIGHT KNEE: Primary | ICD-10-CM

## 2025-03-28 PROCEDURE — 99999 PR PBB SHADOW E&M-EST. PATIENT-LVL III: CPT | Mod: PBBFAC,,, | Performed by: STUDENT IN AN ORGANIZED HEALTH CARE EDUCATION/TRAINING PROGRAM

## 2025-03-28 NOTE — PATIENT INSTRUCTIONS
Assessment:  Madan More is a 54 y.o. female No chief complaint on file.      No diagnosis found.     Plan:  Ultrasound guided Euflexxa gel injection 3 of 3 series to the right knee  Today you received a gel injection. Unlike steroid injections, these gel injections are a lot thicker and can feel different at first.   It is normal to feel some soreness in the first few days because the gel is expanding that joint space.   It is also normal to experience some swelling in the first few days.   If you are feeling discomfort or having swelling, use ice and tylenol to control symptoms.   It is better to keep the knee joint moving so that the gel can get throughout the joint space.   Sometimes it can take a few weeks for the gel injection to start showing noticeable effects. This is normal.   These gel injections can be repeated every 6 months as needed.   Follow up as needed    Follow-up: as needed.    Thank you for choosing [facility] and Dr. Bari Grullon for your orthopedic & sports medicine care. It is our goal to provide you with exceptional care that will help keep you healthy, active, and get you back in the game.    Please do not hesitate to reach out to us via email, phone, or MyChart with any questions, concerns, or feedback.    If you felt that you received exemplary care today, please consider leaving us feedback on [email or web address] at:  [email or web address]    If you are experiencing pain/discomfort ,or have questions after 5pm and would like to be connected to the [facility] on-call team, please call this number and specify which Sports Medicine provider is treating you: [phone]

## 2025-03-28 NOTE — PROCEDURES
Sports Medicine US - Guidance for Needle Placement    Date/Time: 3/28/2025 9:20 AM    Performed by: Bari Grullon MD  Authorized by: Bari Grullon MD  Preparation: Patient was prepped and draped in the usual sterile fashion.  Local anesthesia used: no    Anesthesia:  Local anesthesia used: no    Sedation:  Patient sedated: no    Patient tolerance: patient tolerated the procedure well with no immediate complications  Comments: Ultrasound guidance was used for needle localization. Images were saved and stored for documentation. The appropriate structures were visualized. Dynamic visualization of the needle was continuous throughout the procedures and maintained good position.

## 2025-03-28 NOTE — PROCEDURES
Large Joint Aspiration/Injection: R knee    Date/Time: 3/28/2025 9:20 AM    Performed by: Bari Grullon MD  Authorized by: Bari Grullon MD    Consent Done?:  Yes (Verbal)  Indications:  Arthritis and pain  Site marked: the procedure site was marked    Timeout: prior to procedure the correct patient, procedure, and site was verified    Prep: patient was prepped and draped in usual sterile fashion    Local anesthetic:  Lidocaine 1% without epinephrine  Anesthetic total (ml):  2      Details:  Needle Size:  21 G  Ultrasonic Guidance for needle placement?: Yes    Images are saved and documented.  Approach:  Lateral (superior)  Location:  Knee  Site:  R knee  Medications:  20 mg sodium hyaluronate (EUFLEXXA) 10 mg/mL(mw 2.4 -3.6 million)  Patient tolerance:  Patient tolerated the procedure well with no immediate complications     Ultrasound guidance was used for needle localization. Images were saved and stored for documentation. The appropriate structures were visualized. Dynamic visualization of the needle was continuous throughout the procedures and maintained good position.

## 2025-04-28 ENCOUNTER — HOSPITAL ENCOUNTER (OUTPATIENT)
Dept: RADIOLOGY | Facility: HOSPITAL | Age: 55
Discharge: HOME | End: 2025-04-28
Attending: STUDENT IN AN ORGANIZED HEALTH CARE EDUCATION/TRAINING PROGRAM
Payer: COMMERCIAL

## 2025-04-28 ENCOUNTER — OFFICE VISIT (OUTPATIENT)
Dept: ORTHOPEDICS | Facility: CLINIC | Age: 55
End: 2025-04-28
Payer: COMMERCIAL

## 2025-04-28 VITALS — BODY MASS INDEX: 36.82 KG/M2 | WEIGHT: 195 LBS | HEIGHT: 61 IN

## 2025-04-28 DIAGNOSIS — M17.11 PRIMARY OSTEOARTHRITIS OF RIGHT KNEE: ICD-10-CM

## 2025-04-28 DIAGNOSIS — M25.551 RIGHT HIP PAIN: Primary | ICD-10-CM

## 2025-04-28 DIAGNOSIS — M25.551 RIGHT HIP PAIN: ICD-10-CM

## 2025-04-28 DIAGNOSIS — S83.241D TEAR OF MEDIAL MENISCUS OF RIGHT KNEE, CURRENT, UNSPECIFIED TEAR TYPE, SUBSEQUENT ENCOUNTER: ICD-10-CM

## 2025-04-28 DIAGNOSIS — M46.1 SI (SACROILIAC) JOINT INFLAMMATION: ICD-10-CM

## 2025-04-28 DIAGNOSIS — M25.559 GREATER TROCHANTERIC PAIN SYNDROME: Primary | ICD-10-CM

## 2025-04-28 PROCEDURE — 1160F RVW MEDS BY RX/DR IN RCRD: CPT | Mod: CPTII,S$GLB,, | Performed by: STUDENT IN AN ORGANIZED HEALTH CARE EDUCATION/TRAINING PROGRAM

## 2025-04-28 PROCEDURE — 3008F BODY MASS INDEX DOCD: CPT | Mod: CPTII,S$GLB,, | Performed by: STUDENT IN AN ORGANIZED HEALTH CARE EDUCATION/TRAINING PROGRAM

## 2025-04-28 PROCEDURE — 3044F HG A1C LEVEL LT 7.0%: CPT | Mod: CPTII,S$GLB,, | Performed by: STUDENT IN AN ORGANIZED HEALTH CARE EDUCATION/TRAINING PROGRAM

## 2025-04-28 PROCEDURE — 99214 OFFICE O/P EST MOD 30 MIN: CPT | Mod: 25,S$GLB,, | Performed by: STUDENT IN AN ORGANIZED HEALTH CARE EDUCATION/TRAINING PROGRAM

## 2025-04-28 PROCEDURE — 99999 PR PBB SHADOW E&M-EST. PATIENT-LVL IV: CPT | Mod: PBBFAC,,, | Performed by: STUDENT IN AN ORGANIZED HEALTH CARE EDUCATION/TRAINING PROGRAM

## 2025-04-28 PROCEDURE — 3061F NEG MICROALBUMINURIA REV: CPT | Mod: CPTII,S$GLB,, | Performed by: STUDENT IN AN ORGANIZED HEALTH CARE EDUCATION/TRAINING PROGRAM

## 2025-04-28 PROCEDURE — 73503 X-RAY EXAM HIP UNI 4/> VIEWS: CPT | Mod: TC,PO,RT

## 2025-04-28 PROCEDURE — 3066F NEPHROPATHY DOC TX: CPT | Mod: CPTII,S$GLB,, | Performed by: STUDENT IN AN ORGANIZED HEALTH CARE EDUCATION/TRAINING PROGRAM

## 2025-04-28 PROCEDURE — 20611 DRAIN/INJ JOINT/BURSA W/US: CPT | Mod: RT,S$GLB,, | Performed by: STUDENT IN AN ORGANIZED HEALTH CARE EDUCATION/TRAINING PROGRAM

## 2025-04-28 PROCEDURE — 1159F MED LIST DOCD IN RCRD: CPT | Mod: CPTII,S$GLB,, | Performed by: STUDENT IN AN ORGANIZED HEALTH CARE EDUCATION/TRAINING PROGRAM

## 2025-04-28 PROCEDURE — 73503 X-RAY EXAM HIP UNI 4/> VIEWS: CPT | Mod: 26,RT,, | Performed by: RADIOLOGY

## 2025-04-28 RX ORDER — TRIAMCINOLONE ACETONIDE 40 MG/ML
40 INJECTION, SUSPENSION INTRA-ARTICULAR; INTRAMUSCULAR
Status: DISCONTINUED | OUTPATIENT
Start: 2025-04-28 | End: 2025-04-28 | Stop reason: HOSPADM

## 2025-04-28 RX ADMIN — TRIAMCINOLONE ACETONIDE 40 MG: 40 INJECTION, SUSPENSION INTRA-ARTICULAR; INTRAMUSCULAR at 11:04

## 2025-04-28 NOTE — PATIENT INSTRUCTIONS
Assessment:  Madan More is a 55 y.o. female   Chief Complaint   Patient presents with    Right Hip - Pain    Right Knee - Pain       Encounter Diagnoses   Name Primary?    Right hip pain Yes    Primary osteoarthritis of right knee     Tear of medial meniscus of right knee, current, unspecified tear type, subsequent encounter         Plan:  Ultrasound guided greater trochanteric cortisone injection to the right hip  We discussed the proper protocols after the injection such as no submerging pools, baths tubs, or hot tubs for 24 hr.  Showering is okay today.  We also discussed that blood sugars can be elevated after an injection and asked patient to properly checked her sugars over the next few days and contact their PCP if there are any concerns.  We discussed red flags such as fevers, chills, red, warm, tender joint at the area of injection to please seek medical care immediately.    MRI of right knee  Referral to PT at [facility] in [place]  An order for Physical Therapy within the [facility] system was placed today.  Please expect a call from our Centralized Scheduling number, [phone].  If you do not hear from them in the next few days, please call our local PT department directly at [phone].  Referral to Pain management for SI joint injection  Follow up as needed    Follow-up: as needed.    Thank you for choosing [facility] and Dr. Bari Grullon for your orthopedic & sports medicine care. It is our goal to provide you with exceptional care that will help keep you healthy, active, and get you back in the game.    Please do not hesitate to reach out to us via email, phone, or MyChart with any questions, concerns, or feedback.    If you felt that you received exemplary care today, please consider leaving us feedback on [employer] at:  [email or web address]    If you are experiencing pain/discomfort ,or have questions after 5pm and would  like to be connected to the [facility] [place] on-call team, please call this number and specify which Sports Medicine provider is treating you: [phone]

## 2025-04-28 NOTE — PROCEDURES
Large Joint Aspiration/Injection: R greater trochanteric bursa    Date/Time: 4/28/2025 11:40 AM    Performed by: Bari Grullon MD  Authorized by: Bari Grullon MD    Consent Done?:  Yes (Verbal)  Indications:  Pain  Site marked: the procedure site was marked    Timeout: prior to procedure the correct patient, procedure, and site was verified    Prep: patient was prepped and draped in usual sterile fashion    Local anesthetic:  Bupivacaine 0.5% without epinephrine and lidocaine 1% without epinephrine    Details:  Needle Size:  22 G  Ultrasonic Guidance for needle placement?: Yes    Images are saved and documented.  Approach:  Lateral  Location:  Hip  Site:  R greater trochanteric bursa  Medications:  40 mg triamcinolone acetonide 40 mg/mL  Patient tolerance:  Patient tolerated the procedure well with no immediate complications     Ultrasound guidance was used for needle localization. Images were saved and stored for documentation. The appropriate structures were visualized. Dynamic visualization of the needle was continuous throughout the procedures and maintained good position.

## 2025-04-28 NOTE — PROGRESS NOTES
Patient ID: Madan More  YOB: 1970  MRN: [id number]    Chief Complaint: Pain of the Right Hip and Pain of the Right Knee      Referred By: Brook Carranza DO for Right Hip Pain / Right Knee Pain    History of Present Illness: Madan More is a 55 y.o. female who presents today with right hip pain and continued right knee pain.  Patient last seen in office for right knee Euflexxa injection on 3/28/2025 and reports no improvement in pain since injections.  Pain in right hip has been present for approximately 6 months and located in the posterior hip and gluteal region.  Rates pain in both areas at a 7/10.  Hip increase in pain if laying flat on her back but will decrease if she lies directly on the hip.       The patient is active in none.  Occupation:       Past Medical History:   Past Medical History:   Diagnosis Date    Dysesthesia 01/17/2025    Esophageal obstruction 12/22/2016    GERD (gastroesophageal reflux disease)     Hyperglycemia 01/18/2020    Prediabetes     Snores 01/17/2025    Spasm 01/17/2025    TIA (transient ischemic attack) 01/18/2020     Past Surgical History:   Procedure Laterality Date    ESOPHAGOGASTRODUODENOSCOPY N/A 11/19/2018    Procedure: EGD (ESOPHAGOGASTRODUODENOSCOPY);  Surgeon: Gael Marina MD;  Location: [facility];  Service: Endoscopy;  Laterality: N/A;    HYSTERECTOMY      KNEE SURGERY      OOPHORECTOMY       Family History   Problem Relation Name Age of Onset    Hypertension Mother      No Known Problems Father       Social History[1]  Medication List with Changes/Refills   Current Medications    NYSTATIN (MYCOSTATIN) POWDER    Apply topically 2 (two) times daily.    ROSUVASTATIN (CRESTOR) 20 MG TABLET    Take 1 tablet (20 mg total) by mouth once daily.     Review of patient's allergies indicates:   Allergen Reactions    Penicillins Hives and Swelling       Physical Exam:   Body mass index is 36.84 kg/m².    GENERAL: Well appearing, in no acute  distress.  HEAD: Normocephalic and atraumatic.  ENT: External ears and nose grossly normal.  EYES: EOMI bilaterally  PULMONARY: Respirations are grossly even and non-labored.  NEURO: Awake, alert, and oriented x 3.  SKIN: No obvious rashes appreciated.  PSYCH: Mood & affect are appropriate.    Detailed MSK exam:     Right hip exam:  -ROM: internal rotation 30, external rotation 60  -LELE negative, FADIR negative, Erendira negative  -Muscle strength 5/5 flexion, 5/5 extension, 5/5 abduction, 5/5 adduction  -negative log roll  -negative straight leg raise  -TTP: greater trochanter and SI joint        Imaging:  Sports Medicine US - Guidance for Needle Placement  Bari Grullon MD     3/28/2025  9:23 AM  Sports Medicine US - Guidance for Needle Placement    Date/Time: 3/28/2025 9:20 AM    Performed by: Bari Grullon MD  Authorized by: Bari Grullon MD  Preparation: Patient was prepped and   draped in the usual sterile fashion.  Local anesthesia used: no    Anesthesia:  Local anesthesia used: no    Sedation:  Patient sedated: no    Patient tolerance: patient tolerated the procedure well with no immediate   complications  Comments: Ultrasound guidance was used for needle localization. Images   were saved and stored for documentation. The appropriate structures were   visualized. Dynamic visualization of the needle was continuous throughout   the procedures and maintained good position.         Relevant imaging results were reviewed and interpreted by me and per my read shows no acute abnormalities.  This was discussed with the patient and / or family today.     Assessment:  Madan More is a 55 y.o. female presenting with right hip pain and follow up right knee pain.   History, physical and radiographs are consistent with a likely diagnosis of GTPS, SI inflammation, OA.   Plan: Steroid injection given today (see separate procedure note for details). We discussed the proper protocols after the injection  such as no submerging pools, baths tubs, or hot tubs for 24 hr.  Showering is okay today.  We also discussed that blood sugars can be elevated after an injection and asked patient to properly checked her sugars over the next few days and contact their PCP if there are any concerns.  We discussed red flags such as fevers, chills, red, warm, tender joint at the area of injection to please seek medical care immediately.   Pain referral for SI injection. MRI right knee. PT referral. Continue conservative management for pain.   Follow up as needed. All questions answered.      Greater trochanteric pain syndrome  -     Large Joint Aspiration/Injection: R greater trochanteric bursa    Right hip pain  -     Sports Medicine US - Guidance for Needle Placement  -     Ambulatory Referral/Consult to Physical Therapy; Future; Expected date: 05/05/2025  -     Ambulatory referral/consult to Pain Clinic; Future; Expected date: 05/05/2025    Primary osteoarthritis of right knee  -     Ambulatory Referral/Consult to Physical Therapy; Future; Expected date: 05/05/2025  -     MRI Knee Without Contrast Right; Future; Expected date: 04/28/2025    Tear of medial meniscus of right knee, current, unspecified tear type, subsequent encounter  -     MRI Knee Without Contrast Right; Future; Expected date: 04/28/2025    SI (sacroiliac) joint inflammation         Ultrasound guidance was used for needle localization. Images were saved and stored for documentation. The appropriate structures were visualized. Dynamic visualization of the needle was continuous throughout the procedures and maintained good position.      A copy of today's visit note has been sent to the referring provider.     Electronically signed:  Bari Grullon MD, MPH  04/28/2025  1:23 PM         [1]   Social History  Socioeconomic History    Marital status: Legally    Tobacco Use    Smoking status: Never    Smokeless tobacco: Never   Substance and Sexual Activity     Alcohol use: No    Drug use: No     Social Drivers of Health     Financial Resource Strain: Low Risk  (8/7/2020)    Overall Financial Resource Strain (CARDIA)     Difficulty of Paying Living Expenses: Not very hard   Food Insecurity: No Food Insecurity (8/7/2020)    Hunger Vital Sign     Worried About Running Out of Food in the Last Year: Never true     Ran Out of Food in the Last Year: Never true   Transportation Needs: No Transportation Needs (8/7/2020)    PRAPARE - Transportation     Lack of Transportation (Medical): No     Lack of Transportation (Non-Medical): No   Physical Activity: Insufficiently Active (8/7/2020)    Exercise Vital Sign     Days of Exercise per Week: 4 days     Minutes of Exercise per Session: 30 min   Stress: No Stress Concern Present (8/7/2020)    [employer] - Occupational Stress Questionnaire     Feeling of Stress : Not at all

## 2025-04-30 ENCOUNTER — CLINICAL SUPPORT (OUTPATIENT)
Dept: REHABILITATION | Facility: HOSPITAL | Age: 55
End: 2025-04-30
Payer: COMMERCIAL

## 2025-04-30 ENCOUNTER — PATIENT MESSAGE (OUTPATIENT)
Dept: ORTHOPEDICS | Facility: CLINIC | Age: 55
End: 2025-04-30
Payer: COMMERCIAL

## 2025-04-30 DIAGNOSIS — M62.81 MUSCLE WEAKNESS OF LOWER EXTREMITY: ICD-10-CM

## 2025-04-30 DIAGNOSIS — G89.29 CHRONIC PAIN OF RIGHT KNEE: ICD-10-CM

## 2025-04-30 DIAGNOSIS — M25.551 RIGHT HIP PAIN: ICD-10-CM

## 2025-04-30 DIAGNOSIS — M25.561 CHRONIC PAIN OF RIGHT KNEE: ICD-10-CM

## 2025-04-30 DIAGNOSIS — M17.11 PRIMARY OSTEOARTHRITIS OF RIGHT KNEE: ICD-10-CM

## 2025-04-30 DIAGNOSIS — Z74.09 IMPAIRED FUNCTIONAL MOBILITY, BALANCE, GAIT, AND ENDURANCE: ICD-10-CM

## 2025-04-30 DIAGNOSIS — M25.551 PAIN IN RIGHT HIP: Primary | ICD-10-CM

## 2025-04-30 PROCEDURE — 97110 THERAPEUTIC EXERCISES: CPT | Mod: PN

## 2025-04-30 PROCEDURE — 97161 PT EVAL LOW COMPLEX 20 MIN: CPT | Mod: PN

## 2025-04-30 NOTE — PROGRESS NOTES
Outpatient Rehab    Physical Therapy Evaluation    Patient Name: Madan More  MRN: [id number]  YOB: 1970  Encounter Date: 4/30/2025    Therapy Diagnosis:   Encounter Diagnoses   Name Primary?    Right hip pain     Primary osteoarthritis of right knee     Pain in right hip Yes    Muscle weakness of lower extremity     Impaired functional mobility, balance, gait, and endurance     Chronic pain of right knee      Physician: Bari Grullon MD    Physician Orders: Eval and Treat  Medical Diagnosis: Right hip pain  Primary osteoarthritis of right knee    Visit # / Visits Authorized:  1 / 1  Insurance Authorization Period: 4/28/2025 to 4/28/2026  Date of Evaluation: 4/30/2025  Plan of Care Certification: 4/30/2025 to 6/25/2025     Time In: 0800   Time Out: 0900  Total Time: 60   Total Billable Time: 60    Intake Outcome Measure for FOTO Survey    Therapist reviewed FOTO scores for Madan More on 4/30/2025.   FOTO report - see Media section or FOTO account episode details.     Intake Score: 29%         Subjective   History of Present Illness  Madan is a 55 y.o. female who reports to physical therapy with a chief concern of pain in right hip and knee.     The patient reports a medical diagnosis of M25.551 (ICD-10-CM) - Right hip pain  M17.11 (ICD-10-CM) - Primary osteoarthritis of right knee.    Diagnostic tests related to this condition: X-ray.   X-Ray Details: FINDINGS:  Phleboliths in the pelvis.  No fracture or dislocation of the pelvis.  AP and frog-leg lateral views of the right hip show no fracture or dislocation or degenerative change.  No evidence for avascular necrosis of the right femoral head.    Dominant Hand: Left  History of Present Condition/Illness: Patient reports history of right knee pain for a few years and began having right hip pain about 6 months ago.  She reports receiving injection in her knee and recently in her hip with no significant change in her pain.  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  Patient has increased right hip pain with lying on her back or left side with relief while lying on her right side.  She has attended therapy in the past for knee pain as well as surgery on meniscus in 2017.    Activities of Daily Living  Social history was obtained from Patient.               Previously independent with activities of daily living? Yes     Currently independent with activities of daily living? Yes              Pain     Patient reports a current pain level of 5/10. Pain at best is reported as 4/10. Pain at worst is reported as 9/10.   Location: right hip and knee, mainly medial joint line of knee and along gluteus medius region  Pain Qualities: Aching, Dull, Sharp, Other (Comment)  Other Pain Qualities: deep  Pain-Relieving Factors: Heat  Pain-Aggravating Factors: Walking, Squatting, Lifting, Kneeling         Living Arrangements  Living Situation  Living Arrangements: Alone    Home Setup  Number of Levels in Home: One level        Employment  Employment Status: Employed full-time    at [employer] store and       Past Medical History/Physical Systems Review:   Madan More  has a past medical history of Dysesthesia, Esophageal obstruction, GERD (gastroesophageal reflux disease), Hyperglycemia, Prediabetes, Snores, Spasm, and TIA (transient ischemic attack).    Madan More  has a past surgical history that includes Hysterectomy; Knee surgery; Esophagogastroduodenoscopy (N/A, 11/19/2018); and Oophorectomy.    Madan has a current medication list which includes the following prescription(s): nystatin and rosuvastatin.    Review of patient's allergies indicates:   Allergen Reactions    Penicillins Hives and Swelling        Objective       Hip Range of Motion   Right Hip   Active (deg) Passive (deg) Pain   Flexion 120 130     Extension  "10 14     ABduction 25 32     ADduction         External Rotation 90/90 30 38     External Rotation Prone         Internal Rotation 90/90 32 40     Internal Rotation Prone             Left Hip   Active (deg) Passive (deg) Pain   Flexion 120 130     Extension 10 16     ABduction 25 35     ADduction         External Rotation 90/90 35 40     External Rotation Prone         Internal Rotation 90/90 40 45     Internal Rotation Prone                  Knee Range of Motion   Right Knee   Active (deg) Passive (deg) Pain   Flexion 120       Extension 0           Left Knee   Active (deg) Passive (deg) Pain   Flexion 140       Extension 0                          Hip Strength - Planes of Motion   Right Strength Right Pain Left Strength Left  Pain   Flexion (L2) 4   4+     Extension 4-   4     ABduction 4   4+     ADduction 4+   4+     Internal Rotation 4-   4     External Rotation 4-   4         Knee Strength   Right Strength Right Pain Left Strength Left  Pain   Flexion (S2) 3+ Yes 5     Prone Flexion           Extension (L3) 4- Yes 5                Lumbar/Pelvic Girdle Special Tests  Pelvic Girdle / Sacrum Tests  Positive: Right LELE, Right Gaenslen's, and Right Sacroiliac Compression  Negative: Left LELE, Right FADIR, and Left FADIR  Negative: Right Thigh Thrust/Posterior Shear         Hip Special Tests  Intra-Articular/Impingement Tests  Positive: Right LELE  Negative: Left LELE, Right FADIR, and Left FADIR  Flex/Imbalance/Structural Tests  Negative: Right Piriformis and Left Piriformis  Sacroiliac Joint Tests  Positive: Right Gaenslen's, Right Sacral Thrust, and Right Compression  Negative: Right Distraction and Right Thigh Thrust/Posterior Shear               Gait Analysis  Base of Support: Normal  Gait Pattern: Antalgic  Walking Speed: Decreased    Right Side Walking Observations  Decreased: Stance Time                 Treatment:  Therapeutic Exercise  TE 1: piriformis stretch 3 x 20"  TE 2: supine hip adduction with " ball squeeze 2 x 10  TE 3: supine clamshell with green band 2 x 10  TE 4: SLR 1 x 10  TE 5: bridging 1 x 10  Manual Therapy  MT 1: MET to correct posterior rotation of right ilium      Time Entry(in minutes):  PT Evaluation (Low) Time Entry: 30  Therapeutic Exercise Time Entry: 15    Assessment & Plan   Assessment  Madan presents with a condition of Low complexity.   Presentation of Symptoms: Stable       Functional Limitations: Activity tolerance, Ambulating on uneven surfaces, Completing work/school activities, Decreased ambulation distance/endurance, Painful locomotion/ambulation, Range of motion, Performing household chores, Squatting, Standing tolerance  Impairments: Abnormal gait, Activity intolerance, Weight-bearing intolerance    Patient Goal for Therapy (PT): decrease pain during normal work activities  Prognosis: Good  Assessment Details: Patient is a 55 year old female, referred to physical therapy for the diagnosis of right hip pain and right knee pain.  Patient presents to therapy with signs and symptoms consistent with the diagnosis.  She is noted to ambulate with antalgic gait, decreased stride length, decreased stance phase, decreased ROM, decreased strength, SI joint dysfunction, hip and knee pain causing difficulty with performing her normal work duties.  Pt would benefit from manual therapy, LE stretching and strengthening, gait and balance training, and modalities to decrease pain, improve functional mobility, and return to prior level of function.       Plan  From a physical therapy perspective, the patient would benefit from: Skilled Rehab Services    Planned therapy interventions include: Therapeutic exercise, Therapeutic activities, Neuromuscular re-education, Manual therapy, and Gait training.    Planned modalities to include: Cryotherapy (cold pack), Thermotherapy (hot pack), and Electrical stimulation - passive/unattended.        Visit Frequency: 2 times Per Week for 8 Weeks.        This plan was discussed with Patient.   Discussion participants: Agreed Upon Plan of Care             Patient's spiritual, cultural, and educational needs considered and patient agreeable to plan of care and goals.     Education  Education was done with Patient. The patient's learning style includes Demonstration and Listening. The patient Demonstrates understanding and Verbalizes understanding.                 Goals:   Active       Functional outcome       Patient will show a significant change in FOTO patient-reported outcome tool to demonstrate subjective improvement       Start:  04/30/25    Expected End:  06/25/25            Patient will demonstrate independence in home program for support of progression       Start:  04/30/25    Expected End:  06/25/25               Pain       Patient will report pain of 3/10 demonstrating a reduction of overall pain       Start:  04/30/25    Expected End:  06/25/25               Range of Motion       Patient will achieve right knee ROM of  0-130 degrees.       Start:  04/30/25    Expected End:  06/25/25               Strength       Patient will achieve right hip strength of 4+/5 to improve tolerance to prolonged standing/walking at work.       Start:  04/30/25    Expected End:  06/25/25            Patient will achieve right knee strength of 4+/5       Start:  04/30/25    Expected End:  06/25/25                Bari Graham, PT

## 2025-05-01 ENCOUNTER — HOSPITAL ENCOUNTER (OUTPATIENT)
Dept: RADIOLOGY | Facility: HOSPITAL | Age: 55
Discharge: HOME | End: 2025-05-01
Attending: STUDENT IN AN ORGANIZED HEALTH CARE EDUCATION/TRAINING PROGRAM
Payer: COMMERCIAL

## 2025-05-01 DIAGNOSIS — S83.241D TEAR OF MEDIAL MENISCUS OF RIGHT KNEE, CURRENT, UNSPECIFIED TEAR TYPE, SUBSEQUENT ENCOUNTER: ICD-10-CM

## 2025-05-01 DIAGNOSIS — M17.11 PRIMARY OSTEOARTHRITIS OF RIGHT KNEE: ICD-10-CM

## 2025-05-01 DIAGNOSIS — F40.240 CLAUSTROPHOBIA: Primary | ICD-10-CM

## 2025-05-01 PROCEDURE — 73721 MRI JNT OF LWR EXTRE W/O DYE: CPT | Mod: 26,RT,, | Performed by: RADIOLOGY

## 2025-05-01 PROCEDURE — 73721 MRI JNT OF LWR EXTRE W/O DYE: CPT | Mod: TC,PO,RT

## 2025-05-01 RX ORDER — LORAZEPAM 2 MG/1
2 TABLET ORAL
Qty: 2 TABLET | Refills: 0 | Status: SHIPPED | OUTPATIENT
Start: 2025-05-01

## 2025-05-02 ENCOUNTER — TELEPHONE (OUTPATIENT)
Dept: ORTHOPEDICS | Facility: CLINIC | Age: 55
End: 2025-05-02
Payer: COMMERCIAL

## 2025-05-02 DIAGNOSIS — S83.241D TEAR OF MEDIAL MENISCUS OF RIGHT KNEE, CURRENT, UNSPECIFIED TEAR TYPE, SUBSEQUENT ENCOUNTER: ICD-10-CM

## 2025-05-02 DIAGNOSIS — M17.11 PRIMARY OSTEOARTHRITIS OF RIGHT KNEE: Primary | ICD-10-CM

## 2025-05-02 NOTE — TELEPHONE ENCOUNTER
Returned patient call and let patient know that provider would like for her to have a surgical consult with Dr. Nevarez based on MRI results.  Patient understands that a referral has been placed and Dr. Nevarez's staff should be reaching out to schedule.  ----- Message from Latrice sent at 5/2/2025 10:54 AM CDT -----  Contact: patient  Type:  Patient Returning CallWho Called:,Madan More Who Left Message for Patient: Karrie+Does the patient know what this is regarding?: MRI Would the patient rather a call back or a response via [facility]?  Call Best Call Back Number: [phone] Information:

## 2025-05-02 NOTE — TELEPHONE ENCOUNTER
LVM for patient to inform her that provider read MRI results and is referring for surgical consult with joint replacement specialist.

## 2025-05-05 ENCOUNTER — CLINICAL SUPPORT (OUTPATIENT)
Dept: REHABILITATION | Facility: HOSPITAL | Age: 55
End: 2025-05-05
Payer: COMMERCIAL

## 2025-05-05 DIAGNOSIS — M62.81 MUSCLE WEAKNESS OF LOWER EXTREMITY: ICD-10-CM

## 2025-05-05 DIAGNOSIS — M25.551 PAIN IN RIGHT HIP: Primary | ICD-10-CM

## 2025-05-05 PROCEDURE — 97530 THERAPEUTIC ACTIVITIES: CPT | Mod: PN

## 2025-05-05 PROCEDURE — 97110 THERAPEUTIC EXERCISES: CPT | Mod: PN

## 2025-05-05 NOTE — PROGRESS NOTES
"  Outpatient Rehab    Physical Therapy Visit    Patient Name: Madan More  MRN: [id number]  YOB: 1970  Encounter Date: 5/5/2025    Therapy Diagnosis:   Encounter Diagnoses   Name Primary?    Pain in right hip Yes    Muscle weakness of lower extremity      Physician: Bari Grullon MD    Physician Orders: Eval and Treat  Medical Diagnosis: Right hip pain  Primary osteoarthritis of right knee    Visit # / Visits Authorized:  1 / 10  Insurance Authorization Period: 5/1/2025 to 12/31/2025  Date of Evaluation: 4/30/2025  Plan of Care Certification: 4/30/2025 to 6/25/2025      PT/PTA:     Number of PTA visits since last PT visit:   Time In: 0800   Time Out: 0854  Total Time (in minutes): 54   Total Billable Time (in minutes): 45    FOTO:  Intake Score:  %  Survey Score 2:  %  Survey Score 3:  %         Subjective   she had a lot of pain in hip yesterday.  She wore high heels yesterday.  She has appointment to see orthopedic surgeon on 5/29/2025 about her right knee..  Pain reported as 6/10. right hip    Objective            Treatment:  Therapeutic Exercise  TE 1: recumbent bike x 8 minutes (seat 6)  TE 2: piriformis stretch 3 x 20"  TE 3: 4 way SLR 2 x 10 each position  TE 4: SAQ 2 x 10  TE 5: bridging 2 x 10  TE 6: seated hip ER and IR 2 x 10, green band  Therapeutic Activity  TA 1: shuttle (B) press 2 x 10, 2 cords  TA 2: step ups 2 x 10  TA 3: resisted side stepping 3 laps with red band around ankles  TA 4: sit to stand from 20" box holding 7.5#kb 2 x 10    Time Entry(in minutes):  Therapeutic Activity Time Entry: 12  Therapeutic Exercise Time Entry: 30    Assessment & Plan   Assessment: Patient noted to have level pelvis on this date but remains tender over right SI region.  Today's treatment focussed on strengthening exercises and she did report some disomfort along medial joint line of right knee, but no incresae in hip pain.  Patient may benefit from dry needling to right gluteal " musculature.  Evaluation/Treatment Tolerance: Patient tolerated treatment well    Patient will continue to benefit from skilled outpatient physical therapy to address the deficits listed in the problem list box on initial evaluation, provide pt/family education and to maximize pt's level of independence in the home and community environment.     Patient's spiritual, cultural, and educational needs considered and patient agreeable to plan of care and goals.           Plan: continue with PT POC and possible dry needling    Goals:   Active       Functional outcome       Patient will show a significant change in FOTO patient-reported outcome tool to demonstrate subjective improvement       Start:  04/30/25    Expected End:  06/25/25            Patient will demonstrate independence in home program for support of progression       Start:  04/30/25    Expected End:  06/25/25               Pain       Patient will report pain of 3/10 demonstrating a reduction of overall pain       Start:  04/30/25    Expected End:  06/25/25               Range of Motion       Patient will achieve right knee ROM of  0-130 degrees.       Start:  04/30/25    Expected End:  06/25/25               Strength       Patient will achieve right hip strength of 4+/5 to improve tolerance to prolonged standing/walking at work.       Start:  04/30/25    Expected End:  06/25/25            Patient will achieve right knee strength of 4+/5       Start:  04/30/25    Expected End:  06/25/25                Bari Graham, PT

## 2025-05-12 ENCOUNTER — CLINICAL SUPPORT (OUTPATIENT)
Dept: REHABILITATION | Facility: HOSPITAL | Age: 55
End: 2025-05-12
Payer: COMMERCIAL

## 2025-05-12 DIAGNOSIS — M25.551 PAIN IN RIGHT HIP: Primary | ICD-10-CM

## 2025-05-12 DIAGNOSIS — M62.81 MUSCLE WEAKNESS OF LOWER EXTREMITY: ICD-10-CM

## 2025-05-12 PROCEDURE — 97110 THERAPEUTIC EXERCISES: CPT | Mod: PN

## 2025-05-12 PROCEDURE — 97530 THERAPEUTIC ACTIVITIES: CPT | Mod: PN

## 2025-05-12 NOTE — PROGRESS NOTES
"  Outpatient Rehab    Physical Therapy Visit    Patient Name: Madan More  MRN: [id number]  YOB: 1970  Encounter Date: 5/12/2025    Therapy Diagnosis:   Encounter Diagnoses   Name Primary?    Pain in right hip Yes    Muscle weakness of lower extremity      Physician: Bari Grullon MD    Physician Orders: Eval and Treat  Medical Diagnosis: Right hip pain  Primary osteoarthritis of right knee    Visit # / Visits Authorized:  2 / 10  Insurance Authorization Period: 5/1/2025 to 12/31/2025  Date of Evaluation: 4/30/2025  Plan of Care Certification: 4/30/2025 to 6/25/2025      PT/PTA:     Number of PTA visits since last PT visit:   Time In: 0900   Time Out: 0954  Total Time (in minutes): 54   Total Billable Time (in minutes): 54    FOTO:  Intake Score:  %  Survey Score 2:  %  Survey Score 3:  %    Precautions:       Subjective   Patient complains of tearing pain along medial aspect of right knee and continued lateral hip pain..  Pain reported as 6/10. right hip and knee    Objective            Treatment:  Therapeutic Exercise  TE 1: recumbent bike x 8 minutes (seat 7), level 3  TE 2: LAQ 3 x 10, 2#  TE 3: 4 way SLR 2 x 10, 1# each position  TE 4: SAQ 2 x 10, 2#  TE 5: bridging 2 x 10  TE 6: seated hip ER and IR 2 x 10, green band  TE 7: standing TKE 2 x 10, black band  Therapeutic Activity  TA 1: shuttle (B) press 3 x 10, 2 cords  TA 2: shuttle (U) press 2 x 10, 1 cord  TA 3: resisted side stepping 3 laps with red band around ankles  TA 4: sit to stand from 20" box holding 7.5#kb 2 x 10  TA 5: lateral step ups 2 x 10    Time Entry(in minutes):  Therapeutic Activity Time Entry: 24  Therapeutic Exercise Time Entry: 30    Assessment & Plan   Assessment: Patient has complaints of continued medial knee pain as well as pain along gluteus medius and minimus musculature.  She is able to progress with LE strengthening exercises today, but required increased rest breaks.  Evaluation/Treatment Tolerance: " Patient tolerated treatment well    Patient will continue to benefit from skilled outpatient physical therapy to address the deficits listed in the problem list box on initial evaluation, provide pt/family education and to maximize pt's level of independence in the home and community environment.     Patient's spiritual, cultural, and educational needs considered and patient agreeable to plan of care and goals.           Plan: continue with PT POC and possible dry needling    Goals:   Active       Functional outcome       Patient will show a significant change in FOTO patient-reported outcome tool to demonstrate subjective improvement       Start:  04/30/25    Expected End:  06/25/25            Patient will demonstrate independence in home program for support of progression       Start:  04/30/25    Expected End:  06/25/25               Pain       Patient will report pain of 3/10 demonstrating a reduction of overall pain       Start:  04/30/25    Expected End:  06/25/25               Range of Motion       Patient will achieve right knee ROM of  0-130 degrees.       Start:  04/30/25    Expected End:  06/25/25               Strength       Patient will achieve right hip strength of 4+/5 to improve tolerance to prolonged standing/walking at work.       Start:  04/30/25    Expected End:  06/25/25            Patient will achieve right knee strength of 4+/5       Start:  04/30/25    Expected End:  06/25/25                Bari Graham, PT

## 2025-05-16 ENCOUNTER — OFFICE VISIT (OUTPATIENT)
Dept: PAIN MEDICINE | Facility: CLINIC | Age: 55
End: 2025-05-16
Payer: COMMERCIAL

## 2025-05-16 VITALS
HEART RATE: 55 BPM | SYSTOLIC BLOOD PRESSURE: 98 MMHG | BODY MASS INDEX: 38.46 KG/M2 | HEIGHT: 61 IN | WEIGHT: 203.69 LBS | RESPIRATION RATE: 17 BRPM | DIASTOLIC BLOOD PRESSURE: 65 MMHG

## 2025-05-16 DIAGNOSIS — M24.551 HIP FLEXOR TIGHTNESS, RIGHT: ICD-10-CM

## 2025-05-16 DIAGNOSIS — M25.551 RIGHT HIP PAIN: ICD-10-CM

## 2025-05-16 DIAGNOSIS — M46.1 SACROILIITIS: Primary | ICD-10-CM

## 2025-05-16 PROCEDURE — 99999 PR PBB SHADOW E&M-EST. PATIENT-LVL III: CPT | Mod: PBBFAC,,, | Performed by: PHYSICAL MEDICINE & REHABILITATION

## 2025-05-16 NOTE — PROGRESS NOTES
New Patient Chronic Pain Note (Initial Visit)    Referring Physician: Bari Grullon MD    PCP: Brook Carranza DO    Chief Complaint:   Chief Complaint   Patient presents with    Hip Pain        SUBJECTIVE:    Madan More is a 55 y.o. female who presents to the clinic for the evaluation of right hip pain.  She was referred by Orthopedics for further evaluation and management of this pain.  She has past medical history of migraines, hyperlipidemia, hypertension, anemia, alpha thalassemia trait, prediabetes, obesity, GERD, and multiple other medical comorbidities as listed in her chart.  The pain started several months ago and symptoms have been unchanged.The pain is located in the right lower back/hip area and radiates to the right buttock.  The pain is described as aching and is rated as 7/10. The pain is rated with a score of  5/10 on the BEST day and a score of 8/10 on the WORST day.  Symptoms interfere with daily activity. The pain is exacerbated by prolonged standing, lying down, sit-to-stand.  The pain is mitigated by rest. Employment status:   and     Patient denies night fever/night sweats, urinary incontinence, bowel incontinence, significant weight loss, significant motor weakness, and loss of sensations.    Pain Disability Index Review:         5/16/2025     9:29 AM   Last 3 PDI Scores   Pain Disability Index (PDI) 41       Non-Pharmacologic Treatments:  Physical Therapy/Home Exercise: yes  Ice/Heat:yes  TENS: no  Acupuncture: no  Massage: no  Chiropractic: no    Other: no      Pain Medications:  - Opioids:  None recently  - Adjuvant Medications:  Lorazepam, gabapentin  - Anti-Coagulants:  None     report:  Reviewed and consistent with medication use as prescribed.    Pain Procedures:   -previous GTB and knee joint injections      Imaging:   MRI right knee 05/01/2025:  The anterior and posterior cruciate ligaments appear predominantly intact.     The  quadriceps tendon demonstrates increased T2 signal at the patellar insertion suggesting tendinopathy.  Patellar tendon edema is noted at the tibial tubercle insertion and patellar tendon insertions suggesting tendinopathy.     Medial meniscal tearing is noted with obliquely oriented signal reaching the superior articular surface near the free edge of the posterior horn.  Body of the medial meniscus appears subluxed medially and truncated along its free edge suggesting complex tearing.     The lateral meniscus demonstrates significant signal abnormality diffusely within the body that appears globular and could relate to contusion rather than a tear.  Abnormal signal does reach towards the free edge and tear is difficult to exclude.     Cartilage loss is noted within the nonweightbearing medial femoral condyle posteriorly with underlying osseous edema and cystic change spanning 18 mm that could relate to forming osteochondral injury and degenerative change.  Some osseous edema is noted within the weight-bearing tibial plateau medially with cartilaginous loss favored to span 10 mm.     The femoral insertion of the lateral collateral ligament is expanded and irregular suggesting a history of injury without retraction.  The signal abnormality is such that this would be favored to be chronic.  The medial collateral ligament appears predominantly intact.  The popliteus tendon appears predominantly intact     Lateral patellar tilt is noted.     A small suprapatellar joint effusion appears to be present.     Fluid signal intensity is noted anterior to the tibial tubercle that could relate to contusion or patellar tendinopathy.     Medial and lateral tibial and femoral spurring is noted.      X-ray right hip 04/28/2025:  Phleboliths in the pelvis. No fracture or dislocation of the pelvis. AP and frog-leg lateral views of the right hip show no fracture or dislocation or degenerative change. No evidence for avascular necrosis  of the right femoral head.     X-ray right knee 01/27/2025:  No acute osseous abnormality with similar bilateral multi compartment degenerative findings, most prevalent within the medial compartments. No large joint effusion.       Past Medical History:   Diagnosis Date    Dysesthesia 01/17/2025    Esophageal obstruction 12/22/2016    GERD (gastroesophageal reflux disease)     Hyperglycemia 01/18/2020    Prediabetes     Snores 01/17/2025    Spasm 01/17/2025    TIA (transient ischemic attack) 01/18/2020     Past Surgical History:   Procedure Laterality Date    ESOPHAGOGASTRODUODENOSCOPY N/A 11/19/2018    Procedure: EGD (ESOPHAGOGASTRODUODENOSCOPY);  Surgeon: Gael Marina MD;  Location: [facility];  Service: Endoscopy;  Laterality: N/A;    HYSTERECTOMY      KNEE SURGERY      OOPHORECTOMY       Social History[1]  Family History   Problem Relation Name Age of Onset    Hypertension Mother      No Known Problems Father         Review of patient's allergies indicates:   Allergen Reactions    Penicillins Hives, Swelling and Other (See Comments)     Other Reaction(s): hives, facial swelling    Product containing penicillin (product)       Current Outpatient Medications   Medication Sig    LORazepam (ATIVAN) 2 MG Tab Take 1 tablet (2 mg total) by mouth as needed (1 hour prior to MRI). Can take 2nd dose just prior to entering MRI if needed.    nystatin (MYCOSTATIN) powder Apply topically 2 (two) times daily.    rosuvastatin (CRESTOR) 20 MG tablet Take 1 tablet (20 mg total) by mouth once daily.     No current facility-administered medications for this visit.       Review of Systems     GENERAL:  No weight loss, malaise or fevers.  HEENT:   No recent changes in vision or hearing  NECK:  Negative for lumps, no difficulty with swallowing.  RESPIRATORY:  Negative for cough, wheezing or shortness of breath, patient denies any recent URI.  CARDIOVASCULAR:  Negative for chest pain, leg swelling or palpitations.  GI:  Negative  "for abdominal discomfort, blood in stools or black stools or change in bowel habits.  MUSCULOSKELETAL:  See HPI.  SKIN:  Negative for lesions, rash, and itching.  PSYCH:  No mood disorder or recent psychosocial stressors.  Patients sleep is not disturbed secondary to pain.  HEMATOLOGY/LYMPHOLOGY:  Negative for prolonged bleeding, bruising easily or swollen nodes.  Patient is not currently taking any anti-coagulants  NEURO:   No history of headaches, syncope, paralysis, seizures or tremors.  All other reviewed and negative other than HPI.    OBJECTIVE:    BP 98/65   Pulse (!) 55   Resp 17   Ht 5' 1" (1.549 m)   Wt 92.4 kg (203 lb 11.2 oz)   BMI 38.49 kg/m²         Physical Exam    GENERAL: Well appearing, in no acute distress, alert and oriented x3.  PSYCH:  Mood and affect appropriate.  SKIN: Skin color, texture, turgor normal, no rashes or lesions.  HEAD/FACE:  Normocephalic, atraumatic. Cranial nerves grossly intact.  CV: RRR with palpation of the radial artery.  PULM: No evidence of respiratory difficulty, symmetric chest rise.  GI:  Soft and non-tender.  BACK: Straight leg raising in the sitting and supine positions is negative to radicular pain. No pain to palpation over the facet joints of the lumbar spine or spinous processes. Normal range of motion without pain reproduction.  Minimally positive axial loading test bilaterally. Positive tenderness over the right SIJ with positive thigh and sacral thrust test, Positive FABERE,Ganselin and Yeoman's test on the right side.negative FADIR  EXTREMITIES: No deformities, edema, or skin discoloration. Good capillary refill.  MUSCULOSKELETAL: Shoulder, hip, and knee provocative maneuvers are negative.    Bilateral upper and lower extremity strength is normal and symmetric.  No atrophy or tone abnormalities are noted.  NEURO: Bilateral upper and lower extremity coordination and muscle stretch reflexes are physiologic and symmetric.  Plantar response are downgoing. " No clonus.  No loss of sensation is noted.  GAIT: normal.      LABS:  Lab Results   Component Value Date    WBC 14.82 (H) 01/29/2025    HGB 11.3 (L) 01/29/2025    HCT 37.4 01/29/2025    MCV 73 (L) 01/29/2025     01/29/2025       CMP  Sodium   Date Value Ref Range Status   01/17/2025 143 136 - 145 mmol/L Final     Potassium   Date Value Ref Range Status   01/17/2025 4.3 3.5 - 5.1 mmol/L Final     Chloride   Date Value Ref Range Status   01/17/2025 107 95 - 110 mmol/L Final     CO2   Date Value Ref Range Status   01/17/2025 26 23 - 29 mmol/L Final     Glucose   Date Value Ref Range Status   01/17/2025 91 70 - 110 mg/dL Final     BUN   Date Value Ref Range Status   01/17/2025 9 6 - 20 mg/dL Final     Creatinine   Date Value Ref Range Status   01/17/2025 0.7 0.5 - 1.4 mg/dL Final     Calcium   Date Value Ref Range Status   01/17/2025 8.8 8.7 - 10.5 mg/dL Final     Total Protein   Date Value Ref Range Status   01/17/2025 7.8 6.0 - 8.4 g/dL Final     Albumin   Date Value Ref Range Status   01/17/2025 4.3 3.5 - 5.2 g/dL Final     Total Bilirubin   Date Value Ref Range Status   01/17/2025 0.3 0.1 - 1.0 mg/dL Final     Comment:     For infants and newborns, interpretation of results should be based  on gestational age, weight and in agreement with clinical  observations.    Premature Infant recommended reference ranges:  Up to 24 hours.............<8.0 mg/dL  Up to 48 hours............<12.0 mg/dL  3-5 days..................<15.0 mg/dL  6-29 days.................<15.0 mg/dL       Alkaline Phosphatase   Date Value Ref Range Status   01/17/2025 84 40 - 150 U/L Final     AST   Date Value Ref Range Status   01/17/2025 20 10 - 40 U/L Final     ALT   Date Value Ref Range Status   01/17/2025 16 10 - 44 U/L Final     Anion Gap   Date Value Ref Range Status   01/17/2025 10 8 - 16 mmol/L Final     eGFR if    Date Value Ref Range Status   02/11/2021 >60.0 >60 mL/min/1.73 m^2 Final     eGFR if non African American    Date Value Ref Range Status   02/11/2021 >60.0 >60 mL/min/1.73 m^2 Final     Comment:     Calculation used to obtain the estimated glomerular filtration  rate (eGFR) is the CKD-EPI equation.          Lab Results   Component Value Date    HGBA1C 5.8 (H) 01/17/2025             ASSESSMENT: 55 y.o. year old female with right-sided lower back and hip pain, consistent with     1. Sacroiliitis        2. Right hip pain  Ambulatory referral/consult to Pain Clinic      3. Hip flexor tightness, right              PLAN:   - Interventions:  Considering right-sided sacroiliac joint injection in the future if needed      - Anticoagulation use:  None    - Medications: I have stressed the importance of physical activity and a home exercise plan to help with pain and improve health. and Patient can continue with medications for now since they are providing benefits, using them appropriately, and without side effects.     Discussed turmeric for its anti-inflammatory properties          - Therapy:  Advised patient continue with formal PT prescription    - Psychological:  Discussed coping mechanisms to help address chronic pain issues    - Labs:  Reviewed    - Imaging: Reviewed available imaging with patient and answered any questions they had regarding study.    - Consults/Referrals:  None at this time    - Records:  Reviewed/Obtain old records from outside physicians and imaging    - Follow up visit: return to clinic in 10-12 weeks or as needed    - Counseled patient regarding the importance of activity modification and physical therapy    - This condition does not require this patient to take time off of work, and the primary goal of our Pain Management services is to improve the patient's functional capacity.    - Patient Questions: Answered all of the patient's questions regarding diagnosis, therapy, and treatment        The above plan and management options were discussed at length with patient. Patient is in agreement with the  above and verbalized understanding.    I discussed the goals of interventional chronic pain management with the patient on today's visit.  I explained the utility of injections for diagnostic and therapeutic purposes.  We discussed a multimodal approach to pain including treating the patient's given worst pain at any given time.  We will use a systematic approach to addressing pain.  We will also adopt a multimodal approach that includes injections, adjuvant medications, physical therapy, at times psychiatry.  There may be a limited role for opioid use intermittently in the treatment of pain, more particularly for acute pain although no one approach can be used as a sole treatment modality.    I emphasized the importance of regular exercise, core strengthening and stretching, diet and weight loss as a cornerstone of long-term pain management.      Haider Thrasher MD  Interventional Pain Management  [facility]    Disclaimer:  This note was prepared using voice recognition system and is likely to have sound alike errors that may have been overlooked even after proof reading.  Please call me with any questions         [1]   Social History  Socioeconomic History    Marital status: Legally    Tobacco Use    Smoking status: Never    Smokeless tobacco: Never   Substance and Sexual Activity    Alcohol use: No    Drug use: No    Sexual activity: Yes     Social Drivers of Health     Financial Resource Strain: Low Risk  (8/7/2020)    Overall Financial Resource Strain (CARDIA)     Difficulty of Paying Living Expenses: Not very hard   Food Insecurity: No Food Insecurity (8/7/2020)    Hunger Vital Sign     Worried About Running Out of Food in the Last Year: Never true     Ran Out of Food in the Last Year: Never true   Transportation Needs: No Transportation Needs (8/7/2020)    PRAPARE - Transportation     Lack of Transportation (Medical): No     Lack of Transportation (Non-Medical): No   Physical Activity:  Insufficiently Active (8/7/2020)    Exercise Vital Sign     Days of Exercise per Week: 4 days     Minutes of Exercise per Session: 30 min   Stress: No Stress Concern Present (8/7/2020)    Citizen of [place] of Occupational Health - Occupational Stress Questionnaire     Feeling of Stress : Not at all

## 2025-05-28 ENCOUNTER — CLINICAL SUPPORT (OUTPATIENT)
Dept: REHABILITATION | Facility: HOSPITAL | Age: 55
End: 2025-05-28
Payer: COMMERCIAL

## 2025-05-28 DIAGNOSIS — M25.551 PAIN IN RIGHT HIP: Primary | ICD-10-CM

## 2025-05-28 DIAGNOSIS — M62.81 MUSCLE WEAKNESS OF LOWER EXTREMITY: ICD-10-CM

## 2025-05-28 PROCEDURE — 97110 THERAPEUTIC EXERCISES: CPT | Mod: PN

## 2025-05-28 PROCEDURE — 97530 THERAPEUTIC ACTIVITIES: CPT | Mod: PN

## 2025-05-28 NOTE — PROGRESS NOTES
Outpatient Rehab    Physical Therapy Visit    Patient Name: Madan More  MRN: [id number]  YOB: 1970  Encounter Date: 5/28/2025    Therapy Diagnosis:   Encounter Diagnoses   Name Primary?    Pain in right hip Yes    Muscle weakness of lower extremity      Physician: Bari Grullon MD    Physician Orders: Eval and Treat  Medical Diagnosis: Right hip pain  Primary osteoarthritis of right knee    Visit # / Visits Authorized:  3 / 10  Insurance Authorization Period: 5/1/2025 to 12/31/2025  Date of Evaluation: 4/30/2025  Plan of Care Certification: 4/30/2025 to 6/25/2025      PT/PTA:     Number of PTA visits since last PT visit:   Time In: 0800   Time Out: 0850  Total Time (in minutes): 50   Total Billable Time (in minutes): 40    FOTO:  Intake Score: 29%  Survey Score 2: 56%  Survey Score 3:  %    Precautions:       Subjective             Objective            Treatment:  Therapeutic Exercise  TE 1: Nu-Step x 10 minutes  TE 2: LAQ 3 x 10, 2#  TE 3: 4 way SLR 2 x 10, 1# each position  TE 4: SAQ 3 x 10, 2#  TE 7: standing TKE 2 x 10, black band  Therapeutic Activity  TA 1: shuttle (B) press 3 x 10, 2 cords  TA 2: shuttle (U) press 2 x 10, 1 cord  TA 3: resisted side stepping 3 laps with red band around ankles  TA 6: bridging 2 x 10    Time Entry(in minutes):  Therapeutic Activity Time Entry: 15  Therapeutic Exercise Time Entry: 25    Assessment & Plan   Assessment: Patient reports continued knee and hip pain, but noted to have significant improvement in FOTO status.  She continues with LE stretching and strengthening exercises to improve stability of knee.  Patient has appointment with orhtopedic surgeon tomorrow.  She will continue with current treatment plan unless otherwise advised by MD.  Evaluation/Treatment Tolerance: Patient tolerated treatment well    The patient will continue to benefit from skilled outpatient physical therapy in order to address the deficits listed in the problem list on the  initial evaluation, provide patient and family education, and maximize the patients level of independence in the home and community environments.     The patient's spiritual, cultural, and educational needs were considered, and the patient is agreeable to the plan of care and goals.           Plan: continue with PT POC and possible dry needling    Goals:   Active       Functional outcome       Patient will show a significant change in FOTO patient-reported outcome tool to demonstrate subjective improvement (Progressing)       Start:  04/30/25    Expected End:  06/25/25            Patient will demonstrate independence in home program for support of progression (Progressing)       Start:  04/30/25    Expected End:  06/25/25               Pain       Patient will report pain of 3/10 demonstrating a reduction of overall pain (Progressing)       Start:  04/30/25    Expected End:  06/25/25               Range of Motion       Patient will achieve right knee ROM of  0-130 degrees. (Progressing)       Start:  04/30/25    Expected End:  06/25/25               Strength       Patient will achieve right hip strength of 4+/5 to improve tolerance to prolonged standing/walking at work. (Progressing)       Start:  04/30/25    Expected End:  06/25/25            Patient will achieve right knee strength of 4+/5 (Progressing)       Start:  04/30/25    Expected End:  06/25/25                Bari Graham, PT

## 2025-05-29 ENCOUNTER — OFFICE VISIT (OUTPATIENT)
Dept: ORTHOPEDICS | Facility: CLINIC | Age: 55
End: 2025-05-29
Payer: COMMERCIAL

## 2025-05-29 DIAGNOSIS — S83.241D TEAR OF MEDIAL MENISCUS OF RIGHT KNEE, CURRENT, UNSPECIFIED TEAR TYPE, SUBSEQUENT ENCOUNTER: ICD-10-CM

## 2025-05-29 DIAGNOSIS — M17.11 PRIMARY OSTEOARTHRITIS OF RIGHT KNEE: ICD-10-CM

## 2025-05-29 DIAGNOSIS — Z01.818 PRE-OP TESTING: Primary | ICD-10-CM

## 2025-05-29 PROCEDURE — 3044F HG A1C LEVEL LT 7.0%: CPT | Mod: CPTII,S$GLB,, | Performed by: ORTHOPAEDIC SURGERY

## 2025-05-29 PROCEDURE — 99214 OFFICE O/P EST MOD 30 MIN: CPT | Mod: S$GLB,,, | Performed by: ORTHOPAEDIC SURGERY

## 2025-05-29 PROCEDURE — 3066F NEPHROPATHY DOC TX: CPT | Mod: CPTII,S$GLB,, | Performed by: ORTHOPAEDIC SURGERY

## 2025-05-29 PROCEDURE — 1159F MED LIST DOCD IN RCRD: CPT | Mod: CPTII,S$GLB,, | Performed by: ORTHOPAEDIC SURGERY

## 2025-05-29 PROCEDURE — 3061F NEG MICROALBUMINURIA REV: CPT | Mod: CPTII,S$GLB,, | Performed by: ORTHOPAEDIC SURGERY

## 2025-05-29 PROCEDURE — 99999 PR PBB SHADOW E&M-EST. PATIENT-LVL IV: CPT | Mod: PBBFAC,,, | Performed by: ORTHOPAEDIC SURGERY

## 2025-05-29 PROCEDURE — 1160F RVW MEDS BY RX/DR IN RCRD: CPT | Mod: CPTII,S$GLB,, | Performed by: ORTHOPAEDIC SURGERY

## 2025-05-29 RX ORDER — TOLMETIN SODIUM 600 MG/1
600 TABLET, FILM COATED ORAL 3 TIMES DAILY
Qty: 90 EACH | Refills: 3 | Status: SHIPPED | OUTPATIENT
Start: 2025-05-29

## 2025-05-29 RX ORDER — CLINDAMYCIN PHOSPHATE 900 MG/50ML
900 INJECTION, SOLUTION INTRAVENOUS
OUTPATIENT
Start: 2025-05-29

## 2025-05-29 RX ORDER — SODIUM CHLORIDE 0.9 % (FLUSH) 0.9 %
10 SYRINGE (ML) INJECTION EVERY 6 HOURS PRN
Status: SHIPPED | OUTPATIENT
Start: 2025-06-20

## 2025-05-30 ENCOUNTER — TELEPHONE (OUTPATIENT)
Dept: PHARMACY | Facility: CLINIC | Age: 55
End: 2025-05-30
Payer: COMMERCIAL

## 2025-05-31 NOTE — TELEPHONE ENCOUNTER
[facility]/Population Health Chart Review & Patient Outreach Details For Medication Adherence Project    Reason for Outreach Encounter: 3rd Party payor non-compliance report ([employer], [employer], C, etc)  2.  Patient Outreach Method: Spimehart message  3.   Medication in question: rosuvastatin    LAST FILLED: 3/12/25 for 30 day supply  Hyperlipidemia Medications              rosuvastatin (CRESTOR) 20 MG tablet Take 1 tablet (20 mg total) by mouth once daily.               4.  Reviewed and or Updates Made To: Patient Chart  5. Outreach Outcomes and/or actions taken: Sent inquiry to patient: Waiting for response.

## 2025-06-03 ENCOUNTER — OFFICE VISIT (OUTPATIENT)
Dept: FAMILY MEDICINE | Facility: CLINIC | Age: 55
End: 2025-06-03
Payer: COMMERCIAL

## 2025-06-03 ENCOUNTER — RESULTS FOLLOW-UP (OUTPATIENT)
Dept: FAMILY MEDICINE | Facility: CLINIC | Age: 55
End: 2025-06-03

## 2025-06-03 ENCOUNTER — PATIENT MESSAGE (OUTPATIENT)
Dept: FAMILY MEDICINE | Facility: CLINIC | Age: 55
End: 2025-06-03

## 2025-06-03 ENCOUNTER — HOSPITAL ENCOUNTER (OUTPATIENT)
Dept: RADIOLOGY | Facility: HOSPITAL | Age: 55
Discharge: HOME | End: 2025-06-03
Attending: NURSE PRACTITIONER
Payer: COMMERCIAL

## 2025-06-03 VITALS
HEART RATE: 63 BPM | DIASTOLIC BLOOD PRESSURE: 72 MMHG | HEIGHT: 61 IN | WEIGHT: 204.38 LBS | SYSTOLIC BLOOD PRESSURE: 126 MMHG | OXYGEN SATURATION: 99 % | BODY MASS INDEX: 38.59 KG/M2 | TEMPERATURE: 97 F

## 2025-06-03 DIAGNOSIS — Z01.818 PRE-OP EXAMINATION: Primary | ICD-10-CM

## 2025-06-03 DIAGNOSIS — Z01.818 PRE-OP EXAMINATION: ICD-10-CM

## 2025-06-03 DIAGNOSIS — R31.9 HEMATURIA, UNSPECIFIED TYPE: Primary | ICD-10-CM

## 2025-06-03 LAB
BACTERIA #/AREA URNS HPF: ABNORMAL /HPF
BILIRUB UR QL STRIP.AUTO: NEGATIVE
CLARITY UR: CLEAR
COLOR UR AUTO: YELLOW
GLUCOSE UR QL STRIP: NEGATIVE
HGB UR QL STRIP: ABNORMAL
HOLD SPECIMEN: NORMAL
KETONES UR QL STRIP: NEGATIVE
LEUKOCYTE ESTERASE UR QL STRIP: NEGATIVE
MICROSCOPIC COMMENT: ABNORMAL
NITRITE UR QL STRIP: NEGATIVE
PH UR STRIP: 6 [PH]
PROT UR QL STRIP: NEGATIVE
RBC #/AREA URNS HPF: 6 /HPF (ref 0–4)
SP GR UR STRIP: 1.01
WBC #/AREA URNS HPF: 4 /HPF (ref 0–5)

## 2025-06-03 PROCEDURE — 3066F NEPHROPATHY DOC TX: CPT | Mod: CPTII,S$GLB,, | Performed by: NURSE PRACTITIONER

## 2025-06-03 PROCEDURE — 3074F SYST BP LT 130 MM HG: CPT | Mod: CPTII,S$GLB,, | Performed by: NURSE PRACTITIONER

## 2025-06-03 PROCEDURE — 1159F MED LIST DOCD IN RCRD: CPT | Mod: CPTII,S$GLB,, | Performed by: NURSE PRACTITIONER

## 2025-06-03 PROCEDURE — 93005 ELECTROCARDIOGRAM TRACING: CPT | Mod: S$GLB,,, | Performed by: NURSE PRACTITIONER

## 2025-06-03 PROCEDURE — 99999 PR PBB SHADOW E&M-EST. PATIENT-LVL IV: CPT | Mod: PBBFAC,,, | Performed by: NURSE PRACTITIONER

## 2025-06-03 PROCEDURE — 99213 OFFICE O/P EST LOW 20 MIN: CPT | Mod: S$GLB,,, | Performed by: NURSE PRACTITIONER

## 2025-06-03 PROCEDURE — 71046 X-RAY EXAM CHEST 2 VIEWS: CPT | Mod: TC,PO

## 2025-06-03 PROCEDURE — 3061F NEG MICROALBUMINURIA REV: CPT | Mod: CPTII,S$GLB,, | Performed by: NURSE PRACTITIONER

## 2025-06-03 PROCEDURE — 81002 URINALYSIS NONAUTO W/O SCOPE: CPT | Mod: PO | Performed by: NURSE PRACTITIONER

## 2025-06-03 PROCEDURE — 1160F RVW MEDS BY RX/DR IN RCRD: CPT | Mod: CPTII,S$GLB,, | Performed by: NURSE PRACTITIONER

## 2025-06-03 PROCEDURE — 3008F BODY MASS INDEX DOCD: CPT | Mod: CPTII,S$GLB,, | Performed by: NURSE PRACTITIONER

## 2025-06-03 PROCEDURE — 3078F DIAST BP <80 MM HG: CPT | Mod: CPTII,S$GLB,, | Performed by: NURSE PRACTITIONER

## 2025-06-03 PROCEDURE — 3044F HG A1C LEVEL LT 7.0%: CPT | Mod: CPTII,S$GLB,, | Performed by: NURSE PRACTITIONER

## 2025-06-03 PROCEDURE — 71046 X-RAY EXAM CHEST 2 VIEWS: CPT | Mod: 26,,, | Performed by: RADIOLOGY

## 2025-06-03 PROCEDURE — 93010 ELECTROCARDIOGRAM REPORT: CPT | Mod: S$GLB,,, | Performed by: INTERNAL MEDICINE

## 2025-06-04 ENCOUNTER — LAB VISIT (OUTPATIENT)
Dept: LAB | Facility: HOSPITAL | Age: 55
End: 2025-06-04
Attending: ORTHOPAEDIC SURGERY
Payer: COMMERCIAL

## 2025-06-04 ENCOUNTER — TELEPHONE (OUTPATIENT)
Dept: FAMILY MEDICINE | Facility: CLINIC | Age: 55
End: 2025-06-04
Payer: COMMERCIAL

## 2025-06-04 DIAGNOSIS — Z01.818 PRE-OP TESTING: ICD-10-CM

## 2025-06-04 LAB
ABSOLUTE EOSINOPHIL (OHS): 0.1 K/UL
ABSOLUTE MONOCYTE (OHS): 0.67 K/UL (ref 0.3–1)
ABSOLUTE NEUTROPHIL COUNT (OHS): 5.7 K/UL (ref 1.8–7.7)
ALBUMIN SERPL BCP-MCNC: 4 G/DL (ref 3.5–5.2)
ALP SERPL-CCNC: 83 UNIT/L (ref 40–150)
ALT SERPL W/O P-5'-P-CCNC: 13 UNIT/L (ref 10–44)
ANION GAP (OHS): 8 MMOL/L (ref 8–16)
AST SERPL-CCNC: 16 UNIT/L (ref 11–45)
BASOPHILS # BLD AUTO: 0.05 K/UL
BASOPHILS NFR BLD AUTO: 0.5 %
BILIRUB SERPL-MCNC: 0.4 MG/DL (ref 0.1–1)
BUN SERPL-MCNC: 13 MG/DL (ref 6–20)
CALCIUM SERPL-MCNC: 8.7 MG/DL (ref 8.7–10.5)
CHLORIDE SERPL-SCNC: 108 MMOL/L (ref 95–110)
CO2 SERPL-SCNC: 28 MMOL/L (ref 23–29)
CREAT SERPL-MCNC: 0.8 MG/DL (ref 0.5–1.4)
ERYTHROCYTE [DISTWIDTH] IN BLOOD BY AUTOMATED COUNT: 14.8 % (ref 11.5–14.5)
GFR SERPLBLD CREATININE-BSD FMLA CKD-EPI: >60 ML/MIN/1.73/M2
GLUCOSE SERPL-MCNC: 101 MG/DL (ref 70–110)
HCT VFR BLD AUTO: 39.8 % (ref 37–48.5)
HGB BLD-MCNC: 11.6 GM/DL (ref 12–16)
IMM GRANULOCYTES # BLD AUTO: 0.05 K/UL (ref 0–0.04)
IMM GRANULOCYTES NFR BLD AUTO: 0.5 % (ref 0–0.5)
LYMPHOCYTES # BLD AUTO: 2.88 K/UL (ref 1–4.8)
MCH RBC QN AUTO: 21.2 PG (ref 27–31)
MCHC RBC AUTO-ENTMCNC: 29.1 G/DL (ref 32–36)
MCV RBC AUTO: 73 FL (ref 82–98)
NUCLEATED RBC (/100WBC) (OHS): 0 /100 WBC
OHS QRS DURATION: 70 MS
OHS QTC CALCULATION: 395 MS
PLATELET # BLD AUTO: 261 K/UL (ref 150–450)
PMV BLD AUTO: 10.6 FL (ref 9.2–12.9)
POTASSIUM SERPL-SCNC: 4.2 MMOL/L (ref 3.5–5.1)
PROT SERPL-MCNC: 7.1 GM/DL (ref 6–8.4)
RBC # BLD AUTO: 5.47 M/UL (ref 4–5.4)
RELATIVE EOSINOPHIL (OHS): 1.1 %
RELATIVE LYMPHOCYTE (OHS): 30.5 % (ref 18–48)
RELATIVE MONOCYTE (OHS): 7.1 % (ref 4–15)
RELATIVE NEUTROPHIL (OHS): 60.3 % (ref 38–73)
SODIUM SERPL-SCNC: 144 MMOL/L (ref 136–145)
WBC # BLD AUTO: 9.45 K/UL (ref 3.9–12.7)

## 2025-06-04 PROCEDURE — 36415 COLL VENOUS BLD VENIPUNCTURE: CPT | Mod: PO

## 2025-06-04 PROCEDURE — 80053 COMPREHEN METABOLIC PANEL: CPT

## 2025-06-04 PROCEDURE — 85025 COMPLETE CBC W/AUTO DIFF WBC: CPT

## 2025-06-05 ENCOUNTER — TELEPHONE (OUTPATIENT)
Dept: FAMILY MEDICINE | Facility: CLINIC | Age: 55
End: 2025-06-05
Payer: COMMERCIAL

## 2025-06-05 NOTE — TELEPHONE ENCOUNTER
PS @ [id number]  RE: uti, encephalopathy per Dr. Anyi Akhtar Nims @ [id number] Spoke with patient about this.

## 2025-06-08 DIAGNOSIS — E78.2 MIXED HYPERLIPIDEMIA: ICD-10-CM

## 2025-06-08 RX ORDER — ROSUVASTATIN CALCIUM 20 MG/1
20 TABLET, COATED ORAL DAILY
Qty: 90 TABLET | Refills: 2 | Status: SHIPPED | OUTPATIENT
Start: 2025-06-08 | End: 2026-03-05

## 2025-06-08 NOTE — TELEPHONE ENCOUNTER
No care due was identified.  [facility] Embedded Care Due Messages. Reference number: [id number].   6/08/2025 2:08:37 PM CDT

## 2025-06-08 NOTE — TELEPHONE ENCOUNTER
Refill Decision Note   Madan More  is requesting a refill authorization.  Brief Assessment and Rationale for Refill:  Approve     Medication Therapy Plan:        Comments:     Note composed:2:33 PM 06/08/2025

## 2025-06-16 ENCOUNTER — OFFICE VISIT (OUTPATIENT)
Dept: UROLOGY | Facility: CLINIC | Age: 55
End: 2025-06-16
Payer: COMMERCIAL

## 2025-06-16 VITALS — BODY MASS INDEX: 36.41 KG/M2 | HEIGHT: 63 IN | WEIGHT: 205.5 LBS

## 2025-06-16 DIAGNOSIS — R31.21 ASYMPTOMATIC MICROSCOPIC HEMATURIA: ICD-10-CM

## 2025-06-16 LAB
BILIRUBIN, UA POC OHS: NEGATIVE
BLOOD, UA POC OHS: NEGATIVE
CLARITY, UA POC OHS: CLEAR
COLOR, UA POC OHS: YELLOW
GLUCOSE, UA POC OHS: NEGATIVE
KETONES, UA POC OHS: NEGATIVE
LEUKOCYTES, UA POC OHS: NEGATIVE
NITRITE, UA POC OHS: NEGATIVE
PH, UA POC OHS: 7
PROTEIN, UA POC OHS: NEGATIVE
SPECIFIC GRAVITY, UA POC OHS: 1.02
UROBILINOGEN, UA POC OHS: 0.2

## 2025-06-16 PROCEDURE — 1160F RVW MEDS BY RX/DR IN RCRD: CPT | Mod: CPTII,S$GLB,,

## 2025-06-16 PROCEDURE — 3008F BODY MASS INDEX DOCD: CPT | Mod: CPTII,S$GLB,,

## 2025-06-16 PROCEDURE — 3061F NEG MICROALBUMINURIA REV: CPT | Mod: CPTII,S$GLB,,

## 2025-06-16 PROCEDURE — 1159F MED LIST DOCD IN RCRD: CPT | Mod: CPTII,S$GLB,,

## 2025-06-16 PROCEDURE — 3066F NEPHROPATHY DOC TX: CPT | Mod: CPTII,S$GLB,,

## 2025-06-16 PROCEDURE — 3044F HG A1C LEVEL LT 7.0%: CPT | Mod: CPTII,S$GLB,,

## 2025-06-16 PROCEDURE — 99999 PR PBB SHADOW E&M-EST. PATIENT-LVL III: CPT | Mod: PBBFAC,,,

## 2025-06-16 PROCEDURE — 81003 URINALYSIS AUTO W/O SCOPE: CPT | Mod: QW,S$GLB,,

## 2025-06-16 PROCEDURE — 99203 OFFICE O/P NEW LOW 30 MIN: CPT | Mod: S$GLB,,,

## 2025-06-16 NOTE — PROGRESS NOTES
[facility] Note  Staff: Yuli Wasserman FNP-C    PCP: DO Dillon    Chief Complaint: micro hematuria    Subjective:        HPI: Madan More is a 55 y.o. female NEW PATIENT presents today for evaluation of microscopic hematuria. She states she has never seen blood in her urine. She denies dysuria, hematuria, fever, flank pain, incontinence, and difficulty urinating. She denies a history of kidney stones. She has had 1 urine sample show blood in her urine. Her micro UA from 6/3/2025 showed 6 RBCs per HPF. Her urine today is negative of all components.     Questions asked pt during office visit today:  Urgency:No, incontinence with urgency? No;   DysuriaNo  Gross HematuriaNo  History of UTI: No    History of Kidney Stones?:  no    Constipation issues?:  no    REVIEW OF SYSTEMS:  Review of Systems   Constitutional: Negative.  Negative for chills and fever.   Gastrointestinal: Negative.  Negative for abdominal pain, constipation, diarrhea, nausea and vomiting.   Genitourinary: Negative.  Negative for dysuria, flank pain, frequency, hematuria and urgency.   Musculoskeletal: Negative.  Negative for back pain.       PMHx:  Past Medical History:   Diagnosis Date    Dysesthesia 01/17/2025    Esophageal obstruction 12/22/2016    GERD (gastroesophageal reflux disease)     Hyperglycemia 01/18/2020    Prediabetes     Sleep apnea     diagnosed prior to weight loss    Snores 01/17/2025    Spasm 01/17/2025    TIA (transient ischemic attack) 01/18/2020       PSHx:  Past Surgical History:   Procedure Laterality Date    ESOPHAGOGASTRODUODENOSCOPY N/A 11/19/2018    Procedure: EGD (ESOPHAGOGASTRODUODENOSCOPY);  Surgeon: Gael Marina MD;  Location: [facility];  Service: Endoscopy;  Laterality: N/A;    HYSTERECTOMY      KNEE SURGERY      OOPHORECTOMY         Fam Hx:   malignancies: No , gyn malignancies: No   kidney stones: No     Soc Hx:  Lives in [place]    Allergies:  Penicillins    Medications: reviewed      Objective:   There were no vitals filed for this visit.    Physical Exam  Constitutional:       Appearance: Normal appearance.   Pulmonary:      Effort: Pulmonary effort is normal.   Abdominal:      General: There is no distension.      Palpations: Abdomen is soft.      Tenderness: There is no abdominal tenderness.   Musculoskeletal:         General: Normal range of motion.      Cervical back: Normal range of motion.   Skin:     General: Skin is warm.   Neurological:      Mental Status: She is oriented to person, place, and time.   Psychiatric:         Mood and Affect: Mood normal.         Behavior: Behavior normal.         LABS REVIEW:  UA today:   Color:Clear, Yellow  Spec. Grav.  1.020  PH  7.0  Negative for leukocytes, nitrates, protein, glucose, ketones, urobili, bili, and blood.    Assessment:       1. Asymptomatic microscopic hematuria          Plan:      No further evaluation or treatment needed at this time    F/u as needed    [facility]: active    RINKU Silva

## 2025-06-30 DIAGNOSIS — M17.11 PRIMARY OSTEOARTHRITIS OF RIGHT KNEE: Primary | ICD-10-CM

## 2025-07-02 ENCOUNTER — CLINICAL SUPPORT (OUTPATIENT)
Dept: REHABILITATION | Facility: HOSPITAL | Age: 55
End: 2025-07-02
Payer: COMMERCIAL

## 2025-07-02 DIAGNOSIS — M17.11 PRIMARY OSTEOARTHRITIS OF RIGHT KNEE: ICD-10-CM

## 2025-07-02 PROCEDURE — 97112 NEUROMUSCULAR REEDUCATION: CPT | Mod: PN | Performed by: GENERAL ACUTE CARE HOSPITAL

## 2025-07-02 PROCEDURE — 97161 PT EVAL LOW COMPLEX 20 MIN: CPT | Mod: PN | Performed by: GENERAL ACUTE CARE HOSPITAL

## 2025-07-03 ENCOUNTER — HOSPITAL ENCOUNTER (OUTPATIENT)
Dept: RADIOLOGY | Facility: HOSPITAL | Age: 55
Discharge: HOME | End: 2025-07-03
Attending: ORTHOPAEDIC SURGERY
Payer: COMMERCIAL

## 2025-07-03 ENCOUNTER — OFFICE VISIT (OUTPATIENT)
Dept: ORTHOPEDICS | Facility: CLINIC | Age: 55
End: 2025-07-03
Payer: COMMERCIAL

## 2025-07-03 DIAGNOSIS — M17.11 PRIMARY OSTEOARTHRITIS OF RIGHT KNEE: Primary | ICD-10-CM

## 2025-07-03 DIAGNOSIS — M17.11 PRIMARY OSTEOARTHRITIS OF RIGHT KNEE: ICD-10-CM

## 2025-07-03 PROCEDURE — 3066F NEPHROPATHY DOC TX: CPT | Mod: CPTII,S$GLB,, | Performed by: ORTHOPAEDIC SURGERY

## 2025-07-03 PROCEDURE — 1159F MED LIST DOCD IN RCRD: CPT | Mod: CPTII,S$GLB,, | Performed by: ORTHOPAEDIC SURGERY

## 2025-07-03 PROCEDURE — 3061F NEG MICROALBUMINURIA REV: CPT | Mod: CPTII,S$GLB,, | Performed by: ORTHOPAEDIC SURGERY

## 2025-07-03 PROCEDURE — 99024 POSTOP FOLLOW-UP VISIT: CPT | Mod: S$GLB,,, | Performed by: ORTHOPAEDIC SURGERY

## 2025-07-03 PROCEDURE — 1160F RVW MEDS BY RX/DR IN RCRD: CPT | Mod: CPTII,S$GLB,, | Performed by: ORTHOPAEDIC SURGERY

## 2025-07-03 PROCEDURE — 73560 X-RAY EXAM OF KNEE 1 OR 2: CPT | Mod: 26,LT,, | Performed by: RADIOLOGY

## 2025-07-03 PROCEDURE — 3044F HG A1C LEVEL LT 7.0%: CPT | Mod: CPTII,S$GLB,, | Performed by: ORTHOPAEDIC SURGERY

## 2025-07-03 PROCEDURE — 99999 PR PBB SHADOW E&M-EST. PATIENT-LVL II: CPT | Mod: PBBFAC,,, | Performed by: ORTHOPAEDIC SURGERY

## 2025-07-03 PROCEDURE — 73562 X-RAY EXAM OF KNEE 3: CPT | Mod: 26,RT,, | Performed by: RADIOLOGY

## 2025-07-03 PROCEDURE — 73560 X-RAY EXAM OF KNEE 1 OR 2: CPT | Mod: TC,PO,LT

## 2025-07-09 ENCOUNTER — CLINICAL SUPPORT (OUTPATIENT)
Dept: REHABILITATION | Facility: HOSPITAL | Age: 55
End: 2025-07-09
Payer: COMMERCIAL

## 2025-07-09 DIAGNOSIS — M17.11 PRIMARY OSTEOARTHRITIS OF RIGHT KNEE: Primary | ICD-10-CM

## 2025-07-09 PROCEDURE — 97112 NEUROMUSCULAR REEDUCATION: CPT | Mod: PN | Performed by: GENERAL ACUTE CARE HOSPITAL

## 2025-07-09 PROCEDURE — 97110 THERAPEUTIC EXERCISES: CPT | Mod: PN | Performed by: GENERAL ACUTE CARE HOSPITAL

## 2025-07-09 NOTE — PROGRESS NOTES
Outpatient Rehab    Physical Therapy Visit    Patient Name: Madan More  MRN: [id number]  YOB: 1970  Encounter Date: 7/9/2025    Therapy Diagnosis:   Encounter Diagnosis   Name Primary?    Primary osteoarthritis of right knee Yes     Physician: Drew Nevarez MD    Physician Orders: Eval and Treat  Medical Diagnosis: Primary osteoarthritis of right knee  Surgical Diagnosis: R TKA   Surgical Date: 6/20/2025  Days Since Last Surgery: 19    Visit # / Visits Authorized:  1 / 20  Insurance Authorization Period: 7/3/2025 to 12/31/2025  Date of Evaluation: 7/2/2025  Plan of Care Certification:       PT/PTA: PT   Number of PTA visits since last PT visit:0  Time In: 0955   Time Out: 1100  Total Time (in minutes): 65   Total Billable Time (in minutes):      FOTO:  Intake Score:  %  Survey Score 2:  %  Survey Score 3:  %    Precautions:       Subjective   Patient reports to outpatient physical therapy for first follow up treatment session with no complaints pf R knee pain. She states with knee flexion pain increases to 3/10. Patient has some concerns with ankle swelling, encouraged to elevate after prolonged standing/walking and educated on signs and symptoms of DVT. Patient states she has been compliant with her HEP, she states SL balance is most challenging at this time..  Pain reported as 0/10. R knee    Objective            Treatment:  Therapeutic Exercise  TE 1: nu step x 8 mins LVL 1 seat 8  TE 2: heel prop x 3 mins  TE 3: LAQ 3x10  TE 4: DKTC with orange ball x 3 mins  Balance/Neuromuscular Re-Education  NMR 1: SAQ 3x10  NMR 2: heel slides x 3mins  NMR 3: Prone TKE 3x10  NMR 5: SLR 3x10  NMR 6: standing TKE 3x10  NMR 7: HEP review and education on compliance    Time Entry(in minutes):  Neuromuscular Re-Education Time Entry: 25  Therapeutic Exercise Time Entry: 20    Assessment & Plan   Assessment: Patient reports to outpatient physical therapy for first follow up treatment session. Patient tolerates  neuromuscular reeducation exercises, strengthening, and endurance exercises well with minimal increase in R knee pain. Patient requires verbal/ tactile cueing for quad activation with neuromuscular reeducation exercises.   Evaluation/Treatment Tolerance: Patient tolerated treatment well    The patient will continue to benefit from skilled outpatient physical therapy in order to address the deficits listed in the problem list on the initial evaluation, provide patient and family education, and maximize the patients level of independence in the home and community environments.     The patient's spiritual, cultural, and educational needs were considered, and the patient is agreeable to the plan of care and goals.           Plan: Physical therapy will continue with current plan of care with focus on knee extension ROM.    Goals:   Active       Long term goals       Patient will improve knee FOTO to 86/100 to improve activity participation (Progressing)       Start:  07/02/25    Expected End:  08/27/25            Patient will improve SL stance to 15 sec Bilateral to decrease risk of falls (Progressing)       Start:  07/02/25    Expected End:  08/27/25            Patient will be independent with foundational HEP to improve knowledge of condition.  (Progressing)       Start:  07/02/25    Expected End:  08/27/25               Long term goals       Patient will improve R knee extension to 2 degrees of hyperextension to match LLE to improve ambulation (Progressing)       Start:  07/02/25    Expected End:  08/27/25               Short term goals       Patient will improve R knee flexion to 100 degrees to improve functional mobility (Progressing)       Start:  07/02/25    Expected End:  07/30/25            Patient will improve 5x sit to stand to <25 sec to improve BLE strength. (Progressing)       Start:  07/02/25    Expected End:  07/30/25            Patient will improve R knee extension strength to 4/5 to navigate stairs.   (Progressing)       Start:  07/02/25    Expected End:  07/30/25                Nuha Jolly, SPT

## 2025-07-11 ENCOUNTER — CLINICAL SUPPORT (OUTPATIENT)
Dept: REHABILITATION | Facility: HOSPITAL | Age: 55
End: 2025-07-11
Payer: COMMERCIAL

## 2025-07-11 DIAGNOSIS — Z74.09 IMPAIRED FUNCTIONAL MOBILITY, BALANCE, GAIT, AND ENDURANCE: Primary | ICD-10-CM

## 2025-07-11 PROCEDURE — 97112 NEUROMUSCULAR REEDUCATION: CPT | Mod: PN | Performed by: GENERAL ACUTE CARE HOSPITAL

## 2025-07-11 PROCEDURE — 97530 THERAPEUTIC ACTIVITIES: CPT | Mod: PN | Performed by: GENERAL ACUTE CARE HOSPITAL

## 2025-07-11 PROCEDURE — 97110 THERAPEUTIC EXERCISES: CPT | Mod: PN | Performed by: GENERAL ACUTE CARE HOSPITAL

## 2025-07-11 NOTE — PROGRESS NOTES
"  Outpatient Rehab    Physical Therapy Visit    Patient Name: Madan More  MRN: [id number]  YOB: 1970  Encounter Date: 7/11/2025    Therapy Diagnosis:   Encounter Diagnosis   Name Primary?    Impaired functional mobility, balance, gait, and endurance Yes     Physician: Drew Nevarez MD    Physician Orders: Eval and Treat  Medical Diagnosis: Primary osteoarthritis of right knee  Surgical Diagnosis: R TKA   Surgical Date: 6/20/2025  Days Since Last Surgery: 21    Visit # / Visits Authorized:  2 / 20  Insurance Authorization Period: 7/3/2025 to 12/31/2025  Date of Evaluation: 7/2/2025  Plan of Care Certification:       PT/PTA: PT   Number of PTA visits since last PT visit:0  Time In: 0800   Time Out: 0855  Total Time (in minutes): 55   Total Billable Time (in minutes):      FOTO:  Intake Score:  %  Survey Score 2:  %  Survey Score 3:  %    Precautions:       Subjective   Patient returns to outpatient physical therapy with compliants of mild R knee stiffness, however, no pain. She states the swelling in her RLE (calf and ankle) has minimized a lot..  Pain reported as 0/10. R knee    Objective            Treatment:  Therapeutic Exercise  TE 1: nu step x 10 mins LVL 1 seat 6  TE 2: heel prop x 5 mins  TE 3: LAQ 3x10 R  TE 4: DKTC with orange ball x 3 mins  TE 5: patient educated on compression compliance, given Tubi  size F  Balance/Neuromuscular Re-Education  NMR 5: SLR 3x10 (verbal cueing for R quad activation)  NMR 7: standing hip extensions 3x10 B  NMR 8: standing hip ABD 3x10 B  Therapeutic Activity  TA 1: forward step ups 4" box 2x10 R/L  TA 2: lateral step ups 4" L 1x10    Time Entry(in minutes):  Neuromuscular Re-Education Time Entry: 16  Therapeutic Activity Time Entry: 8  Therapeutic Exercise Time Entry: 31    Assessment & Plan   Assessment: Patient tolerates neuromuscular reeducation exercises, strengthening, and endurance exercises and progression of functional activity exercises well " with no increase in R knee pain to impact ADL performance. Patient requires verbal/tactile cueing to improve R quad activation to address, extensor lag with straight leg raises, patient states these continue to be challenging.   Evaluation/Treatment Tolerance: Patient tolerated treatment well    The patient will continue to benefit from skilled outpatient physical therapy in order to address the deficits listed in the problem list on the initial evaluation, provide patient and family education, and maximize the patients level of independence in the home and community environments.     The patient's spiritual, cultural, and educational needs were considered, and the patient is agreeable to the plan of care and goals.           Plan: Physical therapy will continue with current plan of care. Will assess swelling in ankle at next visit    Goals:   Active       Long term goals       Patient will improve knee FOTO to 86/100 to improve activity participation (Progressing)       Start:  07/02/25    Expected End:  08/27/25            Patient will improve SL stance to 15 sec Bilateral to decrease risk of falls (Progressing)       Start:  07/02/25    Expected End:  08/27/25            Patient will be independent with foundational HEP to improve knowledge of condition.  (Progressing)       Start:  07/02/25    Expected End:  08/27/25               Long term goals       Patient will improve R knee extension to 2 degrees of hyperextension to match LLE to improve ambulation (Progressing)       Start:  07/02/25    Expected End:  08/27/25               Short term goals       Patient will improve R knee flexion to 100 degrees to improve functional mobility (Progressing)       Start:  07/02/25    Expected End:  07/30/25            Patient will improve 5x sit to stand to <25 sec to improve BLE strength. (Progressing)       Start:  07/02/25    Expected End:  07/30/25            Patient will improve R knee extension strength to 4/5 to  navigate stairs.  (Progressing)       Start:  07/02/25    Expected End:  07/30/25                Nuha Jolly, SPT

## 2025-07-15 NOTE — PROGRESS NOTES
No chief complaint on file.      HPI:  55 y.o. female returns to clinic today status post right total knee arthroplasty 2 weeks ago. Pain is mild. Patient is compliant most of the time with restrictions.     right knee: ROM 0-90. Overall normal alignment. Incision healed. No erythema or fluctuance. Stable to stress. Skin intact. Compartments soft. NVI distally.     X-rays were performed today, personally reviewed by me and findings discussed with the patient.  3 views of the right knee show implants intact in good position    Primary osteoarthritis of right knee        Staples out. Begin PT. RTC 6 weeks.

## 2025-07-17 ENCOUNTER — CLINICAL SUPPORT (OUTPATIENT)
Dept: REHABILITATION | Facility: HOSPITAL | Age: 55
End: 2025-07-17
Payer: COMMERCIAL

## 2025-07-17 DIAGNOSIS — Z74.09 IMPAIRED FUNCTIONAL MOBILITY, BALANCE, GAIT, AND ENDURANCE: Primary | ICD-10-CM

## 2025-07-17 PROCEDURE — 97110 THERAPEUTIC EXERCISES: CPT | Mod: PN | Performed by: GENERAL ACUTE CARE HOSPITAL

## 2025-07-17 PROCEDURE — 97112 NEUROMUSCULAR REEDUCATION: CPT | Mod: PN | Performed by: GENERAL ACUTE CARE HOSPITAL

## 2025-07-17 PROCEDURE — 97530 THERAPEUTIC ACTIVITIES: CPT | Mod: PN | Performed by: GENERAL ACUTE CARE HOSPITAL

## 2025-07-17 NOTE — PROGRESS NOTES
"  Outpatient Rehab    Physical Therapy Visit    Patient Name: Madan More  MRN: [id number]  YOB: 1970  Encounter Date: 7/17/2025    Therapy Diagnosis: No diagnosis found.  Physician: Drew Nevarez MD    Physician Orders: Eval and Treat  Medical Diagnosis: Primary osteoarthritis of right knee  Surgical Diagnosis: R TKA   Surgical Date: 6/20/2025  Days Since Last Surgery: 27    Visit # / Visits Authorized:  3 / 20  Insurance Authorization Period: 7/3/2025 to 12/31/2025  Date of Evaluation: 7/2/2025  Plan of Care Certification:       PT/PTA: PT   Number of PTA visits since last PT visit:0  Time In: 1100   Time Out: 1155  Total Time (in minutes): 55   Total Billable Time (in minutes):      FOTO:  Intake Score: 69%  Survey Score 2: Not applicable for this Episode%  Survey Score 3: Not applicable for this Episode%    Precautions:         Subjective   Patient states her knee is feeling great today and that she has been doing well with the tubi  compliance, she notices a difference in amount of swelling in ankle..  Pain reported as 0/10. R knee    Objective            Treatment:  Therapeutic Exercise  TE 1: nu step x 10 mins LVL 1 seat 6  TE 2: heel prop x 3 mins  TE 3: LAQ 3x10 1# R (3 sec hold)  TE 4: DKTC with green ball x 3 mins  TE 5: patient educated on HEP compliance and compression/elevation compliance  Balance/Neuromuscular Re-Education  NMR 1: SAQ 3x10 1# ankle weight  NMR 3: Prone TKE 3x10 3# ankle weight  NMR 4: prone HS curls 1x10  NMR 5: SLR 3x10  NMR 6: standing TKE 3x10 red thera band  Therapeutic Activity  TA 1: forward step ups 4" box 2x10 R/L  TA 2: lateral step ups 3" R L 1x10    Physical therapy technician was utilized in this sessions for therapeutic exercises, neuromuscular reeducation, and therapeutic activity to ensure proper form and ensure proper reps and sets being performed.     Time Entry(in minutes):  Neuromuscular Re-Education Time Entry: 23  Therapeutic Activity Time " Entry: 8  Therapeutic Exercise Time Entry: 23    Assessment & Plan   Assessment: Patient tolerates progression of neuromuscular reeducation exercises as seen with added weight to quad activation exercises. Patient continues to tolerate endurance exercises and functional activity with no reproduction of knee pain. Patient able to tolerate progression of prone HS curls without reproduction of pain or noted muscular tension.   Evaluation/Treatment Tolerance: Patient tolerated treatment well    The patient will continue to benefit from skilled outpatient physical therapy in order to address the deficits listed in the problem list on the initial evaluation, provide patient and family education, and maximize the patients level of independence in the home and community environments.     The patient's spiritual, cultural, and educational needs were considered, and the patient is agreeable to the plan of care and goals.           Plan: Physical therapy will continue with current plan of care progressing as appropriate.    Goals:   Active       Long term goals       Patient will improve knee FOTO to 86/100 to improve activity participation (Progressing)       Start:  07/02/25    Expected End:  08/27/25            Patient will improve SL stance to 15 sec Bilateral to decrease risk of falls (Progressing)       Start:  07/02/25    Expected End:  08/27/25            Patient will be independent with foundational HEP to improve knowledge of condition.  (Progressing)       Start:  07/02/25    Expected End:  08/27/25               Long term goals       Patient will improve R knee extension to 2 degrees of hyperextension to match LLE to improve ambulation (Progressing)       Start:  07/02/25    Expected End:  08/27/25               Short term goals       Patient will improve R knee flexion to 100 degrees to improve functional mobility (Progressing)       Start:  07/02/25    Expected End:  07/30/25            Patient will improve 5x  sit to stand to <25 sec to improve BLE strength. (Progressing)       Start:  07/02/25    Expected End:  07/30/25            Patient will improve R knee extension strength to 4/5 to navigate stairs.  (Progressing)       Start:  07/02/25    Expected End:  07/30/25                Nuha Jolly, SPT

## 2025-07-22 ENCOUNTER — CLINICAL SUPPORT (OUTPATIENT)
Dept: REHABILITATION | Facility: HOSPITAL | Age: 55
End: 2025-07-22
Payer: COMMERCIAL

## 2025-07-22 DIAGNOSIS — Z74.09 IMPAIRED FUNCTIONAL MOBILITY, BALANCE, GAIT, AND ENDURANCE: Primary | ICD-10-CM

## 2025-07-22 PROCEDURE — 97530 THERAPEUTIC ACTIVITIES: CPT | Mod: PN | Performed by: GENERAL ACUTE CARE HOSPITAL

## 2025-07-22 PROCEDURE — 97110 THERAPEUTIC EXERCISES: CPT | Mod: PN | Performed by: GENERAL ACUTE CARE HOSPITAL

## 2025-07-22 PROCEDURE — 97112 NEUROMUSCULAR REEDUCATION: CPT | Mod: PN | Performed by: GENERAL ACUTE CARE HOSPITAL

## 2025-07-22 NOTE — PROGRESS NOTES
Outpatient Rehab    Physical Therapy Visit    Patient Name: Madan More  MRN: [id number]  YOB: 1970  Encounter Date: 7/22/2025    Therapy Diagnosis: No diagnosis found.  Physician: Drew Nevarez MD    Physician Orders: Eval and Treat  Medical Diagnosis: Primary osteoarthritis of right knee  Surgical Diagnosis: R TKA   Surgical Date: 6/20/2025  Days Since Last Surgery: 32    Visit # / Visits Authorized:  4 / 20  Insurance Authorization Period: 7/3/2025 to 12/31/2025  Date of Evaluation: 7/2/2025  Plan of Care Certification:       PT/PTA: PT   Number of PTA visits since last PT visit:0  Time In: 0900   Time Out: 0958  Total Time (in minutes): 58   Total Billable Time (in minutes):      FOTO:  Intake Score (%): 69  Survey Score 2 (%): Not applicable for this Episode  Survey Score 3 (%): Not applicable for this Episode    Precautions:         Subjective   Patient returns to outpatient physical therapy stating her knee is feeling good, she is just overall tired today..  Pain reported as 0/10. R knee    Objective       Knee Range of Motion   Right Knee   Active (deg) Passive (deg) Pain   Flexion 110 112 Yes   Extension -3 -1 Yes                   Treatment:  Therapeutic Exercise  TE 1: nu step x 10 mins LVL 5 seat 6  TE 2: heel prop x 1 min, with 4# ankle weight x 3 mins  TE 3: LAQ 3x10 2# R (3 sec hold)  TE 4: DKTC with green ball x 3 mins  TE 5: ROM flexion and extension  Balance/Neuromuscular Re-Education  NMR 1: SAQ 3x10 2# ankle weight  NMR 2: heel slides x 3mins  NMR 3: Prone TKE 3x10 2# ankle weight  NMR 7: standing hip extensions 3x10 B red thera band  NMR 8: standing hip ABD 3x10 red thera band B  Therapeutic Activity  TA 1: Shuttle leg press 2B 3x10  TA 2: lateral walking x 3 laps    Time Entry(in minutes):  Neuromuscular Re-Education Time Entry: 23  Therapeutic Activity Time Entry: 8  Therapeutic Exercise Time Entry: 23    Assessment & Plan   Assessment: Patient tolerates progression of  functional activity with inclusion of leg press today to impact ADL performance. Patient continues to demonstrates some challenge with quad activation exercises with added weight. Patient displays some increase in pain with flexion and extension of R knee with overpressure.   Evaluation/Treatment Tolerance: Patient tolerated treatment well    The patient will continue to benefit from skilled outpatient physical therapy in order to address the deficits listed in the problem list on the initial evaluation, provide patient and family education, and maximize the patients level of independence in the home and community environments.     The patient's spiritual, cultural, and educational needs were considered, and the patient is agreeable to the plan of care and goals.           Plan: Physical therapy will continue with current plan of care progressing as appropriate.    Goals:   Active       Long term goals       Patient will improve knee FOTO to 86/100 to improve activity participation (Progressing)       Start:  07/02/25    Expected End:  08/27/25            Patient will improve SL stance to 15 sec Bilateral to decrease risk of falls (Progressing)       Start:  07/02/25    Expected End:  08/27/25            Patient will be independent with foundational HEP to improve knowledge of condition.  (Progressing)       Start:  07/02/25    Expected End:  08/27/25               Long term goals       Patient will improve R knee extension to 2 degrees of hyperextension to match LLE to improve ambulation (Progressing)       Start:  07/02/25    Expected End:  08/27/25               Short term goals       Patient will improve R knee flexion to 100 degrees to improve functional mobility (Met)       Start:  07/02/25    Expected End:  07/30/25    Resolved:  07/22/25         Patient will improve 5x sit to stand to <25 sec to improve BLE strength. (Progressing)       Start:  07/02/25    Expected End:  07/30/25            Patient will  improve R knee extension strength to 4/5 to navigate stairs.  (Progressing)       Start:  07/02/25    Expected End:  07/30/25                Nuha Jolly, SPT

## 2025-07-29 ENCOUNTER — CLINICAL SUPPORT (OUTPATIENT)
Dept: REHABILITATION | Facility: HOSPITAL | Age: 55
End: 2025-07-29
Payer: COMMERCIAL

## 2025-07-29 DIAGNOSIS — Z74.09 IMPAIRED FUNCTIONAL MOBILITY, BALANCE, GAIT, AND ENDURANCE: Primary | ICD-10-CM

## 2025-07-29 PROCEDURE — 97140 MANUAL THERAPY 1/> REGIONS: CPT | Mod: PN | Performed by: GENERAL ACUTE CARE HOSPITAL

## 2025-07-29 PROCEDURE — 97110 THERAPEUTIC EXERCISES: CPT | Mod: PN | Performed by: GENERAL ACUTE CARE HOSPITAL

## 2025-07-29 PROCEDURE — 95851 RANGE OF MOTION MEASUREMENTS: CPT | Mod: PN | Performed by: GENERAL ACUTE CARE HOSPITAL

## 2025-07-29 NOTE — PROGRESS NOTES
Outpatient Rehab    Physical Therapy Progress Note : Updated Plan of Care    Patient Name: Madan More  MRN: [id number]  YOB: 1970  Encounter Date: 7/29/2025    Therapy Diagnosis: No diagnosis found.  Physician: Drew Nevarez MD    Physician Orders: Eval and Treat  Medical Diagnosis: Primary osteoarthritis of right knee  Surgical Diagnosis: R TKA   Surgical Date: 6/20/2025  Days Since Last Surgery: 39    Visit # / Visits Authorized: 5 / 20  Insurance Authorization Period: 7/3/2025 to 12/31/2025  Date of Evaluation: 7/2/2025   Plan of Care Certification: 7/29/2025 to 8/28/25     PT/PTA: PT   Number of PTA visits since last PT visit:0  Time In: 0900   Time Out: 1000  Total Time (in minutes): 60   Total Billable Time (in minutes):      FOTO:  Intake Score (%): 56  Survey Score 2 (%): 69  Survey Score 3 (%): 77    Precautions:       Subjective   Patient returns to physical therapy approx 5.5 weeks s/p R TKA. Patient reports no pain in the R knee. She is moving well and reports primary limtiation is full knee flexion like when attempting to sit on the sofa with her legs curled up..  Pain reported as 0/10. R knee    Objective       Hip Range of Motion   Right Hip   Active (deg) Passive (deg) Pain   Flexion         Extension         ABduction         ADduction         External Rotation 90/90 38       External Rotation Prone         Internal Rotation 90/90 39       Internal Rotation Prone             Left Hip   Active (deg) Passive (deg) Pain   Flexion         Extension         ABduction         ADduction         External Rotation 90/90 38       External Rotation Prone         Internal Rotation 90/90 46       Internal Rotation Prone                  Knee Range of Motion   Right Knee   Active (deg) Passive (deg) Pain   Flexion 117       Extension 2           Left Knee   Active (deg) Passive (deg) Pain   Flexion 130       Extension -7 (hyperextension)           R: 0-2-117 / L: -7-0-130            Treatment:     CPT Interventions & Parameters   TE  2(23) Seated bike x 10 minutes  Prone quad stretch with strap x 10 pulls with overpressure(self)  Passive extension hang x 5 minutes Pre & Post  Double knee to chest 3x10  Plan of care review & discussion   NMR   (5) Ball bridges 3x10     MT  1(15) Double knee to chest with over pressure x15 reps   Passive over pressure extension (heel prop) R  Supine over pressure knee flexion  Supine IR/ER passive range of motion to tolerance R hip   Range of motion  1(x) Bilateral knee flexion & extension  Bilateral hip IR/ER      *A portion of this treatment session is provided with the assistance of a skilled rehabilitation technician under the supervision of a licensed physical therapist  *Billing accurately reflects 1:1 treatment time per patient's insurance guidelines.     Home Exercises and Patient Education Reviewed   Patient was educated on the role of Physical Therapy, Treatment plan, Discharge Goals, Home Exercise Program.  Patient educated on biomechanical justification for therapeutic exercise and importance of compliance with Home Exercise Program in order to improve overall impairments and Quality of Life.  Patient was educated on all the above exercise prior/during/after for proper posture, positioning, and execution for safe performance with home exercise program.     Disclaimer: This note was prepared using a voice recognition system and is likely to have sound alike errors within the text.    Time Entry(in minutes):  Manual Therapy Time Entry: 15  Neuromuscular Re-Education Time Entry: 5  Therapeutic Exercise Time Entry: 23    Assessment & Plan   Assessment  Patient displays impaired end range flexion and extension on the R knee as compared to the left. While she displays great pain control and improving function, locomotion and strength-end range of motion still needs to be achieved. Increased overpressure, stretching and self-stretching is recommended at  this time. Discussion with patient regarding updated home exercise program and self-stretching is had.   Evaluation/Treatment Tolerance: Patient tolerated treatment well    The patient will continue to benefit from skilled outpatient physical therapy in order to address the deficits listed in the problem list on the initial evaluation, provide patient and family education, and maximize the patients level of independence in the home and community environments.     The patient's spiritual, cultural, and educational needs were considered, and the patient is agreeable to the plan of care and goals.           Goals:   Active       Long term goals       Patient will improve knee FOTO to 86/100 to improve activity participation (Progressing)       Start:  07/02/25    Expected End:  08/27/25            Patient will improve SL stance to 15 sec Bilateral to decrease risk of falls (Progressing)       Start:  07/02/25    Expected End:  08/27/25            Patient will be independent with foundational HEP to improve knowledge of condition.  (Progressing)       Start:  07/02/25    Expected End:  08/27/25               Long term goals       Patient will improve R knee extension to 2 degrees of hyperextension to match LLE to improve ambulation (Progressing)       Start:  07/02/25    Expected End:  08/27/25               Short term goals       Patient will improve R knee flexion to 100 degrees to improve functional mobility (Met)       Start:  07/02/25    Expected End:  07/30/25    Resolved:  07/22/25         Patient will improve 5x sit to stand to <25 sec to improve BLE strength. (Progressing)       Start:  07/02/25    Expected End:  07/30/25            Patient will improve R knee extension strength to 4/5 to navigate stairs.  (Progressing)       Start:  07/02/25    Expected End:  07/30/25                Brook Cummings, PT, DPT

## 2025-07-31 ENCOUNTER — CLINICAL SUPPORT (OUTPATIENT)
Dept: REHABILITATION | Facility: HOSPITAL | Age: 55
End: 2025-07-31
Payer: COMMERCIAL

## 2025-07-31 DIAGNOSIS — Z74.09 IMPAIRED FUNCTIONAL MOBILITY, BALANCE, GAIT, AND ENDURANCE: Primary | ICD-10-CM

## 2025-07-31 PROCEDURE — 97110 THERAPEUTIC EXERCISES: CPT | Mod: PN | Performed by: GENERAL ACUTE CARE HOSPITAL

## 2025-07-31 PROCEDURE — 97530 THERAPEUTIC ACTIVITIES: CPT | Mod: PN | Performed by: GENERAL ACUTE CARE HOSPITAL

## 2025-07-31 PROCEDURE — 97112 NEUROMUSCULAR REEDUCATION: CPT | Mod: PN | Performed by: GENERAL ACUTE CARE HOSPITAL

## 2025-07-31 NOTE — PROGRESS NOTES
Outpatient Rehab  Physical Therapy Visit    Patient Name: Madan More  MRN: [id number]  YOB: 1970  Encounter Date: 7/31/2025    Therapy Diagnosis: No diagnosis found.  Physician: Drew Nevarez MD    Physician Orders: Eval and Treat  Medical Diagnosis: Primary osteoarthritis of right knee  Surgical Diagnosis: R TKA   Surgical Date: 6/20/2025  Days Since Last Surgery: 41    Visit # / Visits Authorized:  6 / 20  Insurance Authorization Period: 7/3/2025 to 12/31/2025  Date of Evaluation: 7/2/2025  Plan of Care Certification: 7/29/2025 to 8/28/2025      PT/PTA: PT   Number of PTA visits since last PT visit:0  Time In: 0905   Time Out: 1000  Total Time (in minutes): 55   Total Billable Time (in minutes):      FOTO:  Intake Score (%): 56  Survey Score 2 (%): 69  Survey Score 3 (%): 77    Precautions:         Subjective   Patient returns to physical therapy reporting no subjective change since last session..  Pain reported as 2/10. R knee    Objective            Treatment:     CPT Interventions & Parameters   TE  2(23) Standing gastroc stretch x 2 minutes - both legs  R knee flexion stretch at step 10x10s   Double knee to chest 3x10  Seated bike level - 6 x 7 minutes    NMR   1(8) Prone TKEs 4# 3x10 R  Prone hamstring curl 4# 3x10 R   MT  (5) Double knee to chest with over pressure x15 reps      TA  1(10) Deadlifts 3x6 - 10#  Lateral walking in // bars - green x 4 laps       *A portion of this treatment session was provided with the assistance of a skilled rehabilitation technician under the supervision of a licensed physical therapist  *Billing accurately reflects 1:1 treatment time per patient's insurance guidelines.     Home Exercises and Patient Education Reviewed   Patient was educated on the role of Physical Therapy, Treatment plan, Discharge Goals, Home Exercise Program.  Patient educated on biomechanical justification for therapeutic exercise and importance of compliance with Home Exercise  Program in order to improve overall impairments and Quality of Life.  Patient was educated on all the above exercise prior/during/after for proper posture, positioning, and execution for safe performance with home exercise program.     Disclaimer: This note was prepared using a voice recognition system and is likely to have sound alike errors within the text.    Time Entry(in minutes):  Manual Therapy Time Entry: 5  Neuromuscular Re-Education Time Entry: 8  Therapeutic Activity Time Entry: 10  Therapeutic Exercise Time Entry: 23    Assessment & Plan   Assessment: Improved R knee flexion with decrease pain is noted today. Patient continues to displays improvements from session to session. She is encouraged to continue stretching at home to impact end range of motion improvements.  Evaluation/Treatment Tolerance: Patient tolerated treatment well    The patient will continue to benefit from skilled outpatient physical therapy in order to address the deficits listed in the problem list on the initial evaluation, provide patient and family education, and maximize the patients level of independence in the home and community environments.     The patient's spiritual, cultural, and educational needs were considered, and the patient is agreeable to the plan of care and goals.           Plan: Continue with current program emphasis on end range of motion for  R knee. PLan to add in additioanl Hip IR/ER execises/stretches as necessary.    Goals:   Active       Long term goals       Patient will improve knee FOTO to 86/100 to improve activity participation (Progressing)       Start:  07/02/25    Expected End:  08/27/25            Patient will improve SL stance to 15 sec Bilateral to decrease risk of falls (Progressing)       Start:  07/02/25    Expected End:  08/27/25            Patient will be independent with foundational HEP to improve knowledge of condition.  (Met)       Start:  07/02/25    Expected End:  08/27/25     Resolved:  07/31/25            Long term goals       Patient will improve R knee extension to 2 degrees of hyperextension to match LLE to improve ambulation (Progressing)       Start:  07/02/25    Expected End:  08/27/25               Short term goals       Patient will improve R knee flexion to 100 degrees to improve functional mobility (Met)       Start:  07/02/25    Expected End:  07/30/25    Resolved:  07/22/25         Patient will improve 5x sit to stand to <25 sec to improve BLE strength. (Progressing)       Start:  07/02/25    Expected End:  07/30/25            Patient will improve R knee extension strength to 4/5 to navigate stairs.  (Met)       Start:  07/02/25    Expected End:  07/30/25    Resolved:  07/31/25             Brook Cummings, PT, DPT

## 2025-08-05 ENCOUNTER — CLINICAL SUPPORT (OUTPATIENT)
Dept: REHABILITATION | Facility: HOSPITAL | Age: 55
End: 2025-08-05
Payer: COMMERCIAL

## 2025-08-05 DIAGNOSIS — Z74.09 IMPAIRED FUNCTIONAL MOBILITY, BALANCE, GAIT, AND ENDURANCE: Primary | ICD-10-CM

## 2025-08-05 PROCEDURE — 97530 THERAPEUTIC ACTIVITIES: CPT | Mod: PN | Performed by: GENERAL ACUTE CARE HOSPITAL

## 2025-08-05 PROCEDURE — 97110 THERAPEUTIC EXERCISES: CPT | Mod: PN | Performed by: GENERAL ACUTE CARE HOSPITAL

## 2025-08-05 PROCEDURE — 97140 MANUAL THERAPY 1/> REGIONS: CPT | Mod: PN | Performed by: GENERAL ACUTE CARE HOSPITAL

## 2025-08-05 PROCEDURE — 97112 NEUROMUSCULAR REEDUCATION: CPT | Mod: PN | Performed by: GENERAL ACUTE CARE HOSPITAL

## 2025-08-05 NOTE — PROGRESS NOTES
Outpatient Rehab    Physical Therapy Visit    Patient Name: Madan More  MRN: [id number]  YOB: 1970  Encounter Date: 8/5/2025    Therapy Diagnosis: No diagnosis found.  Physician: Drew Nevarez MD    Physician Orders: Eval and Treat  Medical Diagnosis: Primary osteoarthritis of right knee  Surgical Diagnosis: R TKA   Surgical Date: 6/20/2025  Days Since Last Surgery: 46    Visit # / Visits Authorized:  7 / 20  Insurance Authorization Period: 7/3/2025 to 12/31/2025  Date of Evaluation: 7/2/2025  Plan of Care Certification: 7/29/2025 to 8/28/2025      PT/PTA: PT   Number of PTA visits since last PT visit:0  Time In: 0845   Time Out: 0950  Total Time (in minutes): 65   Total Billable Time (in minutes):      FOTO:  Intake Score (%): 56  Survey Score 2 (%): 69  Survey Score 3 (%): 77    Precautions:         Subjective   Patient reports improving function with the surgical knee. She has been compliant with HEP daily. Patient reports that she will be going back to work as a  at the end of the week. 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 Range of motion and strength is appropaite for driving..  Pain reported as 1/10. R knee    Objective            Treatment:     CPT Interventions & Parameters   TE  1(18) Seated bike x 8 minutes   Glute kicks on shuttle .5B 3x10 bilateral  Double knee to chest with orange ball x 15 reps   Seated LAQ 5# 3x10 RLE   NMR  1(10) Standing TKEs - black band 3x15 R  Standing hip abd - 5# 3x10 R  Standing hip ext - 5# 3x10 R   TA  1(12) Leg press single leg 3x10 R .5B  Double leg press 3x10 both legs 1.5B  Lateral walking in // bars - green x 4 laps   MT  1(12) Seated IR/ER passive range of motion R knee sitting edge of mat  Double knee to chest with orange ball -PT over pressure   Knee extension overpressure in long sitting       *A portion of this treatment session was provided with the assistance of a skilled rehabilitation technician under  the supervision of a licensed physical therapist  *Billing accurately reflects 1:1 treatment time per patient's insurance guidelines.     Home Exercises and Patient Education Reviewed   Patient was educated on the role of Physical Therapy, Treatment plan, Discharge Goals, Home Exercise Program.  Patient educated on biomechanical justification for therapeutic exercise and importance of compliance with Home Exercise Program in order to improve overall impairments and Quality of Life.  Patient was educated on all the above exercise prior/during/after for proper posture, positioning, and execution for safe performance with home exercise program.     Disclaimer: This note was prepared using a voice recognition system and is likely to have sound alike errors within the text.    Time Entry(in minutes):       Assessment & Plan   Assessment: Patient continues to display improving range of motion on the surgical limb. Improvements are noted with hip IR/ER on the right side today post session a well.   Evaluation/Treatment Tolerance: Patient tolerated treatment well    The patient will continue to benefit from skilled outpatient physical therapy in order to address the deficits listed in the problem list on the initial evaluation, provide patient and family education, and maximize the patients level of independence in the home and community environments.     The patient's spiritual, cultural, and educational needs were considered, and the patient is agreeable to the plan of care and goals.           Plan: Continue with progression of weight bearing exercises and single leg tasks to impact RLE fucntion. patient is approrpaite to retunr to work as a  this week basedo n her current locomotion, strength and range of motion. will progress complexity of exericses as appropriate    Goals:   Active       Long term goals       Patient will improve knee FOTO to 86/100 to improve activity participation (Progressing)        Start:  07/02/25    Expected End:  08/27/25            Patient will improve SL stance to 15 sec Bilateral to decrease risk of falls (Progressing)       Start:  07/02/25    Expected End:  08/27/25            Patient will be independent with foundational HEP to improve knowledge of condition.  (Met)       Start:  07/02/25    Expected End:  08/27/25    Resolved:  07/31/25            Long term goals       Patient will improve R knee extension to 2 degrees of hyperextension to match LLE to improve ambulation (Progressing)       Start:  07/02/25    Expected End:  08/27/25               Short term goals       Patient will improve R knee flexion to 100 degrees to improve functional mobility (Met)       Start:  07/02/25    Expected End:  07/30/25    Resolved:  07/22/25         Patient will improve 5x sit to stand to <25 sec to improve BLE strength. (Progressing)       Start:  07/02/25    Expected End:  08/12/25            Patient will improve R knee extension strength to 4/5 to navigate stairs.  (Met)       Start:  07/02/25    Expected End:  07/30/25    Resolved:  07/31/25             Brook Cummings, PT, DPT

## 2025-08-07 ENCOUNTER — TELEPHONE (OUTPATIENT)
Dept: PHARMACY | Facility: CLINIC | Age: 55
End: 2025-08-07
Payer: COMMERCIAL

## 2025-08-07 NOTE — TELEPHONE ENCOUNTER
[facility]/Population Health Chart Review & Patient Outreach Details For Medication Adherence Project    Reason for Outreach Encounter: 3rd Party payor non-compliance report ([employer], [employer], C, etc)  2.  Patient Outreach Method: Reviewed Patient Chart  3.   Medication in question: rosuvastatin    LAST FILLED: 6/9/25 for 90 day supply  Hyperlipidemia Medications              rosuvastatin (CRESTOR) 20 MG tablet Take 1 tablet (20 mg total) by mouth once daily.               4.  Reviewed and or Updates Made To: Patient Chart  5. Outreach Outcomes and/or actions taken: Patient filled medication and is on track to be adherent

## 2025-08-12 ENCOUNTER — CLINICAL SUPPORT (OUTPATIENT)
Dept: REHABILITATION | Facility: HOSPITAL | Age: 55
End: 2025-08-12
Payer: COMMERCIAL

## 2025-08-12 DIAGNOSIS — Z74.09 IMPAIRED FUNCTIONAL MOBILITY, BALANCE, GAIT, AND ENDURANCE: Primary | ICD-10-CM

## 2025-08-12 PROBLEM — M25.551 PAIN IN RIGHT HIP: Status: RESOLVED | Noted: 2025-04-30 | Resolved: 2025-08-12

## 2025-08-12 PROBLEM — M62.81 MUSCLE WEAKNESS OF LOWER EXTREMITY: Status: RESOLVED | Noted: 2025-04-30 | Resolved: 2025-08-12

## 2025-08-12 PROCEDURE — 97140 MANUAL THERAPY 1/> REGIONS: CPT | Mod: PN | Performed by: GENERAL ACUTE CARE HOSPITAL

## 2025-08-12 PROCEDURE — 97110 THERAPEUTIC EXERCISES: CPT | Mod: PN | Performed by: GENERAL ACUTE CARE HOSPITAL

## 2025-08-12 PROCEDURE — 97530 THERAPEUTIC ACTIVITIES: CPT | Mod: PN | Performed by: GENERAL ACUTE CARE HOSPITAL

## 2025-08-13 DIAGNOSIS — M25.569 KNEE PAIN, UNSPECIFIED CHRONICITY, UNSPECIFIED LATERALITY: Primary | ICD-10-CM

## 2025-08-14 ENCOUNTER — CLINICAL SUPPORT (OUTPATIENT)
Dept: REHABILITATION | Facility: HOSPITAL | Age: 55
End: 2025-08-14
Payer: COMMERCIAL

## 2025-08-14 DIAGNOSIS — Z74.09 IMPAIRED FUNCTIONAL MOBILITY, BALANCE, GAIT, AND ENDURANCE: Primary | ICD-10-CM

## 2025-08-14 PROCEDURE — 97010 HOT OR COLD PACKS THERAPY: CPT | Mod: PN | Performed by: GENERAL ACUTE CARE HOSPITAL

## 2025-08-14 PROCEDURE — 97112 NEUROMUSCULAR REEDUCATION: CPT | Mod: PN | Performed by: GENERAL ACUTE CARE HOSPITAL

## 2025-08-14 PROCEDURE — 97016 VASOPNEUMATIC DEVICE THERAPY: CPT | Mod: PN | Performed by: GENERAL ACUTE CARE HOSPITAL

## 2025-08-14 PROCEDURE — 97140 MANUAL THERAPY 1/> REGIONS: CPT | Mod: PN | Performed by: GENERAL ACUTE CARE HOSPITAL

## 2025-08-19 ENCOUNTER — CLINICAL SUPPORT (OUTPATIENT)
Dept: REHABILITATION | Facility: HOSPITAL | Age: 55
End: 2025-08-19
Payer: COMMERCIAL

## 2025-08-19 DIAGNOSIS — Z74.09 IMPAIRED FUNCTIONAL MOBILITY, BALANCE, GAIT, AND ENDURANCE: Primary | ICD-10-CM

## 2025-08-19 PROCEDURE — 97112 NEUROMUSCULAR REEDUCATION: CPT | Mod: PN | Performed by: GENERAL ACUTE CARE HOSPITAL

## 2025-08-19 PROCEDURE — 97140 MANUAL THERAPY 1/> REGIONS: CPT | Mod: PN | Performed by: GENERAL ACUTE CARE HOSPITAL

## 2025-08-19 PROCEDURE — 97530 THERAPEUTIC ACTIVITIES: CPT | Mod: PN | Performed by: GENERAL ACUTE CARE HOSPITAL

## 2025-08-20 DIAGNOSIS — M25.569 KNEE PAIN, UNSPECIFIED CHRONICITY, UNSPECIFIED LATERALITY: Primary | ICD-10-CM

## 2025-08-21 ENCOUNTER — CLINICAL SUPPORT (OUTPATIENT)
Dept: REHABILITATION | Facility: HOSPITAL | Age: 55
End: 2025-08-21
Payer: COMMERCIAL

## 2025-08-21 DIAGNOSIS — Z74.09 IMPAIRED FUNCTIONAL MOBILITY, BALANCE, GAIT, AND ENDURANCE: Primary | ICD-10-CM

## 2025-08-21 PROCEDURE — 97016 VASOPNEUMATIC DEVICE THERAPY: CPT | Mod: PN | Performed by: GENERAL ACUTE CARE HOSPITAL

## 2025-08-21 PROCEDURE — 97110 THERAPEUTIC EXERCISES: CPT | Mod: PN | Performed by: GENERAL ACUTE CARE HOSPITAL

## 2025-08-21 PROCEDURE — 97010 HOT OR COLD PACKS THERAPY: CPT | Mod: PN | Performed by: GENERAL ACUTE CARE HOSPITAL

## 2025-08-21 PROCEDURE — 97112 NEUROMUSCULAR REEDUCATION: CPT | Mod: PN | Performed by: GENERAL ACUTE CARE HOSPITAL

## 2025-08-22 ENCOUNTER — OFFICE VISIT (OUTPATIENT)
Dept: ORTHOPEDICS | Facility: CLINIC | Age: 55
End: 2025-08-22
Payer: COMMERCIAL

## 2025-08-22 ENCOUNTER — HOSPITAL ENCOUNTER (OUTPATIENT)
Dept: RADIOLOGY | Facility: HOSPITAL | Age: 55
Discharge: HOME | End: 2025-08-22
Attending: NURSE PRACTITIONER
Payer: COMMERCIAL

## 2025-08-22 VITALS
DIASTOLIC BLOOD PRESSURE: 86 MMHG | SYSTOLIC BLOOD PRESSURE: 126 MMHG | WEIGHT: 205 LBS | BODY MASS INDEX: 36.32 KG/M2 | HEIGHT: 63 IN | HEART RATE: 78 BPM

## 2025-08-22 DIAGNOSIS — M25.569 KNEE PAIN, UNSPECIFIED CHRONICITY, UNSPECIFIED LATERALITY: ICD-10-CM

## 2025-08-22 DIAGNOSIS — Z96.651 S/P TOTAL KNEE ARTHROPLASTY, RIGHT: Primary | ICD-10-CM

## 2025-08-22 PROCEDURE — 3044F HG A1C LEVEL LT 7.0%: CPT | Mod: CPTII,S$GLB,, | Performed by: NURSE PRACTITIONER

## 2025-08-22 PROCEDURE — 99024 POSTOP FOLLOW-UP VISIT: CPT | Mod: S$GLB,,, | Performed by: NURSE PRACTITIONER

## 2025-08-22 PROCEDURE — 73560 X-RAY EXAM OF KNEE 1 OR 2: CPT | Mod: 26,LT,, | Performed by: RADIOLOGY

## 2025-08-22 PROCEDURE — 99999 PR PBB SHADOW E&M-EST. PATIENT-LVL III: CPT | Mod: PBBFAC,,, | Performed by: NURSE PRACTITIONER

## 2025-08-22 PROCEDURE — 3061F NEG MICROALBUMINURIA REV: CPT | Mod: CPTII,S$GLB,, | Performed by: NURSE PRACTITIONER

## 2025-08-22 PROCEDURE — 3066F NEPHROPATHY DOC TX: CPT | Mod: CPTII,S$GLB,, | Performed by: NURSE PRACTITIONER

## 2025-08-22 PROCEDURE — 3079F DIAST BP 80-89 MM HG: CPT | Mod: CPTII,S$GLB,, | Performed by: NURSE PRACTITIONER

## 2025-08-22 PROCEDURE — 3074F SYST BP LT 130 MM HG: CPT | Mod: CPTII,S$GLB,, | Performed by: NURSE PRACTITIONER

## 2025-08-22 PROCEDURE — 73562 X-RAY EXAM OF KNEE 3: CPT | Mod: 26,RT,, | Performed by: RADIOLOGY

## 2025-08-22 PROCEDURE — 73562 X-RAY EXAM OF KNEE 3: CPT | Mod: TC,PO,RT

## 2025-08-26 ENCOUNTER — OFFICE VISIT (OUTPATIENT)
Dept: UROLOGY | Facility: CLINIC | Age: 55
End: 2025-08-26
Payer: COMMERCIAL

## 2025-08-26 VITALS — HEIGHT: 63 IN | BODY MASS INDEX: 35.2 KG/M2 | WEIGHT: 198.63 LBS

## 2025-08-26 DIAGNOSIS — R31.0 GROSS HEMATURIA: Primary | ICD-10-CM

## 2025-08-26 LAB
BACTERIA #/AREA URNS HPF: NORMAL /HPF
BILIRUBIN, UA POC OHS: NEGATIVE
BLOOD, UA POC OHS: ABNORMAL
CLARITY, UA POC OHS: CLEAR
COLOR, UA POC OHS: YELLOW
GLUCOSE, UA POC OHS: NEGATIVE
KETONES, UA POC OHS: NEGATIVE
LEUKOCYTES, UA POC OHS: NEGATIVE
MICROSCOPIC COMMENT: NORMAL
NITRITE, UA POC OHS: NEGATIVE
PH, UA POC OHS: 8
PROTEIN, UA POC OHS: NEGATIVE
RBC #/AREA URNS HPF: 2 /HPF (ref 0–4)
SPECIFIC GRAVITY, UA POC OHS: 1.02
SQUAMOUS #/AREA URNS HPF: 1 /HPF
UROBILINOGEN, UA POC OHS: 0.2
WBC #/AREA URNS HPF: 2 /HPF (ref 0–5)

## 2025-08-26 PROCEDURE — 99999 PR PBB SHADOW E&M-EST. PATIENT-LVL III: CPT | Mod: PBBFAC,,,

## 2025-08-26 PROCEDURE — 81000 URINALYSIS NONAUTO W/SCOPE: CPT | Mod: PO

## 2025-08-26 PROCEDURE — 3061F NEG MICROALBUMINURIA REV: CPT | Mod: CPTII,S$GLB,,

## 2025-08-26 PROCEDURE — 87086 URINE CULTURE/COLONY COUNT: CPT

## 2025-08-26 PROCEDURE — 3008F BODY MASS INDEX DOCD: CPT | Mod: CPTII,S$GLB,,

## 2025-08-26 PROCEDURE — 3066F NEPHROPATHY DOC TX: CPT | Mod: CPTII,S$GLB,,

## 2025-08-26 PROCEDURE — 1160F RVW MEDS BY RX/DR IN RCRD: CPT | Mod: CPTII,S$GLB,,

## 2025-08-26 PROCEDURE — 81003 URINALYSIS AUTO W/O SCOPE: CPT | Mod: QW,S$GLB,,

## 2025-08-26 PROCEDURE — 3044F HG A1C LEVEL LT 7.0%: CPT | Mod: CPTII,S$GLB,,

## 2025-08-26 PROCEDURE — 99213 OFFICE O/P EST LOW 20 MIN: CPT | Mod: S$GLB,,,

## 2025-08-26 PROCEDURE — 1159F MED LIST DOCD IN RCRD: CPT | Mod: CPTII,S$GLB,,

## 2025-08-28 ENCOUNTER — TELEPHONE (OUTPATIENT)
Dept: UROLOGY | Facility: CLINIC | Age: 55
End: 2025-08-28
Payer: COMMERCIAL

## 2025-08-28 LAB — BACTERIA UR CULT: NO GROWTH

## 2025-09-05 DIAGNOSIS — R31.0 GROSS HEMATURIA: Primary | ICD-10-CM
